# Patient Record
Sex: MALE | Race: WHITE
[De-identification: names, ages, dates, MRNs, and addresses within clinical notes are randomized per-mention and may not be internally consistent; named-entity substitution may affect disease eponyms.]

---

## 2019-09-04 ENCOUNTER — HOSPITAL ENCOUNTER (OUTPATIENT)
Dept: HOSPITAL 75 - RAD | Age: 74
End: 2019-09-04
Attending: ORTHOPAEDIC SURGERY
Payer: MEDICARE

## 2019-09-04 DIAGNOSIS — M48.061: Primary | ICD-10-CM

## 2019-09-04 DIAGNOSIS — Z98.1: ICD-10-CM

## 2019-09-04 PROCEDURE — 72148 MRI LUMBAR SPINE W/O DYE: CPT

## 2019-09-04 NOTE — DIAGNOSTIC IMAGING REPORT
PROCEDURE: MRI lumbar spine.



TECHNIQUE: Multiplanar, multisequence MRI of the lumbar spine was

performed without contrast.



INDICATION: Chronic low back pain with recent increase in

severity.



COMPARISON: There is no previous study available at this time for

comparison.



FINDINGS: Lumbar spinal curvature and alignment are unremarkable.

Vertebral body heights and disc spaces are maintained, although

there has been an L4-L5 discectomy with posterior fusion at L4-L5

on the left. Conus medullaris is unremarkable at the L1 level.

There is mild bulging of the L1-L2 disc. At L2-L3, there is

diffuse disc bulging which is eccentric toward the right. This

results in mild neuroforaminal stenosis, greater on the right.



At L3-L4, there is also disc bulging which is eccentric toward

the right resulting in mild left and moderate right

neuroforaminal stenosis.



At the L4-L5 level, disc bulging and endplate spurring result in

moderate bilateral neuroforaminal stenosis. There is mild disc

bulging at L5-S1 without significant stenosis. Note is made of

dominant cystic structure which is partially visualized in the

left retroperitoneum. This may represent dominant renal cyst.



IMPRESSION:

1. 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

without significant spinal stenosis identified. L4-L5 discectomy

and posterior fusion are noted.

2. There is a dominant cystic structure in the left

retroperitoneum which may be renal in nature. Clinical

correlation would be of use. Consideration could be given to

ultrasonography for confirmation.



Dictated by: 



  Dictated on workstation # [id number]

## 2020-05-29 ENCOUNTER — HOSPITAL ENCOUNTER (INPATIENT)
Dept: HOSPITAL 75 - ER | Age: 75
LOS: 1 days | Discharge: HOME HEALTH | DRG: 392 | End: 2020-05-30
Attending: INTERNAL MEDICINE | Admitting: INTERNAL MEDICINE
Payer: MEDICARE

## 2020-05-29 VITALS — HEIGHT: 70 IN | WEIGHT: 199.96 LBS | BODY MASS INDEX: 28.63 KG/M2

## 2020-05-29 VITALS — DIASTOLIC BLOOD PRESSURE: 89 MMHG | SYSTOLIC BLOOD PRESSURE: 139 MMHG

## 2020-05-29 VITALS — DIASTOLIC BLOOD PRESSURE: 85 MMHG | SYSTOLIC BLOOD PRESSURE: 135 MMHG

## 2020-05-29 VITALS — DIASTOLIC BLOOD PRESSURE: 84 MMHG | SYSTOLIC BLOOD PRESSURE: 126 MMHG

## 2020-05-29 VITALS — SYSTOLIC BLOOD PRESSURE: 124 MMHG | DIASTOLIC BLOOD PRESSURE: 77 MMHG

## 2020-05-29 VITALS — SYSTOLIC BLOOD PRESSURE: 145 MMHG | DIASTOLIC BLOOD PRESSURE: 93 MMHG

## 2020-05-29 DIAGNOSIS — N40.0: ICD-10-CM

## 2020-05-29 DIAGNOSIS — R53.81: ICD-10-CM

## 2020-05-29 DIAGNOSIS — I48.0: ICD-10-CM

## 2020-05-29 DIAGNOSIS — Z79.01: ICD-10-CM

## 2020-05-29 DIAGNOSIS — M54.9: ICD-10-CM

## 2020-05-29 DIAGNOSIS — R11.2: Primary | ICD-10-CM

## 2020-05-29 DIAGNOSIS — Z20.828: ICD-10-CM

## 2020-05-29 DIAGNOSIS — R29.6: ICD-10-CM

## 2020-05-29 DIAGNOSIS — I10: ICD-10-CM

## 2020-05-29 DIAGNOSIS — Z87.891: ICD-10-CM

## 2020-05-29 DIAGNOSIS — R05: ICD-10-CM

## 2020-05-29 DIAGNOSIS — I08.2: ICD-10-CM

## 2020-05-29 DIAGNOSIS — R53.83: ICD-10-CM

## 2020-05-29 DIAGNOSIS — R63.4: ICD-10-CM

## 2020-05-29 DIAGNOSIS — G89.29: ICD-10-CM

## 2020-05-29 LAB
ALBUMIN SERPL-MCNC: 3.7 GM/DL (ref 3.2–4.5)
ALP SERPL-CCNC: 76 U/L (ref 40–136)
ALT SERPL-CCNC: 10 U/L (ref 0–55)
AMYLASE SERPL-CCNC: 35 U/L (ref 25–125)
APTT BLD: 39 SEC (ref 24–35)
APTT PPP: YELLOW S
BACTERIA #/AREA URNS HPF: NEGATIVE /HPF
BASOPHILS # BLD AUTO: 0 10^3/UL (ref 0–0.1)
BASOPHILS NFR BLD AUTO: 0 % (ref 0–10)
BILIRUB SERPL-MCNC: 0.8 MG/DL (ref 0.1–1)
BILIRUB UR QL STRIP: NEGATIVE
BUN/CREAT SERPL: 8
CALCIUM SERPL-MCNC: 9.3 MG/DL (ref 8.5–10.1)
CHLORIDE SERPL-SCNC: 100 MMOL/L (ref 98–107)
CO2 SERPL-SCNC: 23 MMOL/L (ref 21–32)
CREAT SERPL-MCNC: 0.84 MG/DL (ref 0.6–1.3)
D DIMER PPP FEU-MCNC: 0.82 UG/ML (ref 0–0.49)
EOSINOPHIL # BLD AUTO: 0 10^3/UL (ref 0–0.3)
EOSINOPHIL NFR BLD AUTO: 0 % (ref 0–10)
ERYTHROCYTE [DISTWIDTH] IN BLOOD BY AUTOMATED COUNT: 14.1 % (ref 10–14.5)
FIBRINOGEN PPP-MCNC: CLEAR MG/DL
GFR SERPLBLD BASED ON 1.73 SQ M-ARVRAT: > 60 ML/MIN
GLUCOSE SERPL-MCNC: 165 MG/DL (ref 70–105)
GLUCOSE UR STRIP-MCNC: NEGATIVE MG/DL
HCT VFR BLD CALC: 39 % (ref 40–54)
HGB BLD-MCNC: 13.3 G/DL (ref 13.3–17.7)
INR PPP: 1.3 (ref 0.8–1.4)
KETONES UR QL STRIP: (no result)
LEUKOCYTE ESTERASE UR QL STRIP: NEGATIVE
LIPASE SERPL-CCNC: 22 U/L (ref 8–78)
LYMPHOCYTES # BLD AUTO: 0.9 X 10^3 (ref 1–4)
LYMPHOCYTES NFR BLD AUTO: 13 % (ref 12–44)
MAGNESIUM SERPL-MCNC: 1.6 MG/DL (ref 1.6–2.4)
MANUAL DIFFERENTIAL PERFORMED BLD QL: NO
MCH RBC QN AUTO: 30 PG (ref 25–34)
MCHC RBC AUTO-ENTMCNC: 34 G/DL (ref 32–36)
MCV RBC AUTO: 86 FL (ref 80–99)
MONOCYTES # BLD AUTO: 0.7 X 10^3 (ref 0–1)
MONOCYTES NFR BLD AUTO: 10 % (ref 0–12)
NEUTROPHILS # BLD AUTO: 5.3 X 10^3 (ref 1.8–7.8)
NEUTROPHILS NFR BLD AUTO: 77 % (ref 42–75)
NITRITE UR QL STRIP: NEGATIVE
PH UR STRIP: 7.5 [PH] (ref 5–9)
PLATELET # BLD: 264 10^3/UL (ref 130–400)
PMV BLD AUTO: 8.8 FL (ref 7.4–10.4)
POTASSIUM SERPL-SCNC: 3.9 MMOL/L (ref 3.6–5)
PROT SERPL-MCNC: 7.4 GM/DL (ref 6.4–8.2)
PROT UR QL STRIP: NEGATIVE
PROTHROMBIN TIME: 16.7 SEC (ref 12.2–14.7)
RBC #/AREA URNS HPF: (no result) /HPF
SODIUM SERPL-SCNC: 138 MMOL/L (ref 135–145)
SP GR UR STRIP: 1.01 (ref 1.02–1.02)
SQUAMOUS #/AREA URNS HPF: (no result) /HPF
WBC # BLD AUTO: 6.9 10^3/UL (ref 4.3–11)
WBC #/AREA URNS HPF: (no result) /HPF

## 2020-05-29 PROCEDURE — 74177 CT ABD & PELVIS W/CONTRAST: CPT

## 2020-05-29 PROCEDURE — 85379 FIBRIN DEGRADATION QUANT: CPT

## 2020-05-29 PROCEDURE — 93306 TTE W/DOPPLER COMPLETE: CPT

## 2020-05-29 PROCEDURE — 80053 COMPREHEN METABOLIC PANEL: CPT

## 2020-05-29 PROCEDURE — 83615 LACTATE (LD) (LDH) ENZYME: CPT

## 2020-05-29 PROCEDURE — 85652 RBC SED RATE AUTOMATED: CPT

## 2020-05-29 PROCEDURE — 71045 X-RAY EXAM CHEST 1 VIEW: CPT

## 2020-05-29 PROCEDURE — 87040 BLOOD CULTURE FOR BACTERIA: CPT

## 2020-05-29 PROCEDURE — 84145 PROCALCITONIN (PCT): CPT

## 2020-05-29 PROCEDURE — 87635 SARS-COV-2 COVID-19 AMP PRB: CPT

## 2020-05-29 PROCEDURE — 85025 COMPLETE CBC W/AUTO DIFF WBC: CPT

## 2020-05-29 PROCEDURE — 36415 COLL VENOUS BLD VENIPUNCTURE: CPT

## 2020-05-29 PROCEDURE — 80162 ASSAY OF DIGOXIN TOTAL: CPT

## 2020-05-29 PROCEDURE — 96375 TX/PRO/DX INJ NEW DRUG ADDON: CPT

## 2020-05-29 PROCEDURE — 86141 C-REACTIVE PROTEIN HS: CPT

## 2020-05-29 PROCEDURE — 81000 URINALYSIS NONAUTO W/SCOPE: CPT

## 2020-05-29 PROCEDURE — 93041 RHYTHM ECG TRACING: CPT

## 2020-05-29 PROCEDURE — 87631 RESP VIRUS 3-5 TARGETS: CPT

## 2020-05-29 PROCEDURE — 85730 THROMBOPLASTIN TIME PARTIAL: CPT

## 2020-05-29 PROCEDURE — 85610 PROTHROMBIN TIME: CPT

## 2020-05-29 PROCEDURE — 93005 ELECTROCARDIOGRAM TRACING: CPT

## 2020-05-29 PROCEDURE — 84484 ASSAY OF TROPONIN QUANT: CPT

## 2020-05-29 PROCEDURE — 83735 ASSAY OF MAGNESIUM: CPT

## 2020-05-29 PROCEDURE — 96374 THER/PROPH/DIAG INJ IV PUSH: CPT

## 2020-05-29 PROCEDURE — 94640 AIRWAY INHALATION TREATMENT: CPT

## 2020-05-29 PROCEDURE — 83605 ASSAY OF LACTIC ACID: CPT

## 2020-05-29 PROCEDURE — 82962 GLUCOSE BLOOD TEST: CPT

## 2020-05-29 PROCEDURE — 96372 THER/PROPH/DIAG INJ SC/IM: CPT

## 2020-05-29 PROCEDURE — 87798 DETECT AGENT NOS DNA AMP: CPT

## 2020-05-29 PROCEDURE — 83690 ASSAY OF LIPASE: CPT

## 2020-05-29 PROCEDURE — 96361 HYDRATE IV INFUSION ADD-ON: CPT

## 2020-05-29 PROCEDURE — 82150 ASSAY OF AMYLASE: CPT

## 2020-05-29 RX ADMIN — DEXTROSE MONOHYDRATE, SODIUM CHLORIDE, AND POTASSIUM CHLORIDE SCH MLS/HR: 50; 4.5; 1.49 INJECTION, SOLUTION INTRAVENOUS at 08:44

## 2020-05-29 RX ADMIN — ONDANSETRON PRN MG: 2 INJECTION, SOLUTION INTRAMUSCULAR; INTRAVENOUS at 19:29

## 2020-05-29 RX ADMIN — APIXABAN SCH MG: 5 TABLET, FILM COATED ORAL at 21:38

## 2020-05-29 RX ADMIN — INSULIN ASPART SCH UNIT: 100 INJECTION, SOLUTION INTRAVENOUS; SUBCUTANEOUS at 11:15

## 2020-05-29 RX ADMIN — DEXTROSE MONOHYDRATE, SODIUM CHLORIDE, AND POTASSIUM CHLORIDE SCH MLS/HR: 50; 4.5; 1.49 INJECTION, SOLUTION INTRAVENOUS at 15:50

## 2020-05-29 RX ADMIN — INSULIN ASPART SCH UNIT: 100 INJECTION, SOLUTION INTRAVENOUS; SUBCUTANEOUS at 23:04

## 2020-05-29 RX ADMIN — ONDANSETRON PRN MG: 2 INJECTION, SOLUTION INTRAMUSCULAR; INTRAVENOUS at 11:10

## 2020-05-29 RX ADMIN — ALBUTEROL SULFATE SCH PUFF: 90 AEROSOL, METERED RESPIRATORY (INHALATION) at 22:45

## 2020-05-29 RX ADMIN — INSULIN ASPART SCH UNIT: 100 INJECTION, SOLUTION INTRAVENOUS; SUBCUTANEOUS at 15:36

## 2020-05-29 RX ADMIN — SENNOSIDES SCH MG: 8.6 TABLET, FILM COATED ORAL at 21:38

## 2020-05-29 RX ADMIN — GABAPENTIN SCH MG: 300 CAPSULE ORAL at 21:38

## 2020-05-29 RX ADMIN — DOCUSATE SODIUM SCH MG: 100 CAPSULE ORAL at 21:38

## 2020-05-29 RX ADMIN — METOPROLOL TARTRATE SCH MG: 50 TABLET, FILM COATED ORAL at 21:39

## 2020-05-29 RX ADMIN — ALBUTEROL SULFATE SCH GM: 90 AEROSOL, METERED RESPIRATORY (INHALATION) at 14:13

## 2020-05-29 NOTE — XMS REPORT
Continuity of Care Document

                             Created on: 2020



DUKE MONSALVE

External Reference #: [id number]

: 1945

Sex: Male



Demographics





                          Address                   [address]  [address]

 

                          Home Phone                [phone] x

 

                          Preferred Language        Unknown

 

                          Marital Status            Unknown

 

                          Mu-ism Affiliation     Unknown

 

                          Race                      Unknown

 

                          Ethnic Group              Unknown





Author





                          Organization              Unknown

 

                          Address                   Unknown

 

                          Phone                     Unavailable



              



Allergies

      



             Active           Description           Code           Type         

  Severity   

                Reaction           Onset           Reported/Identified          

 

Relationship to Patient                 Clinical Status        

 

           Yes           AMOXICILLIN                                   MODERATE 

          

DERMATOLOGICAL - WILL[facility]                                                     

        

 

 

           Yes           AMOXICILLIN                                   MODERATE 

          

MODERATE                                                               



                    



Medications

      



                Medication           Packaging           Start Date           St

op Date         

                    Route               Dosage              Sig        

 

                                      OXYCODONE 5MG/APAP 325MG TAB(PERCOCET-5)  

                   TAB    

             2018                                    

         

   PRN Q6H                  

 

                                HYDRALAZINE TAB 25 MG (APRESOLINE)              

       MG                  

2018                                                 

      

PRN Q4H                  

 

                                      ALBUTEROL INHALER MDI 8 GM (VENTOLIN HFA) 

                    

PUFF(S)           2018                               

    

                                                    PRN QID                  

 

                                      Docusate sodium 100mg oral capsule (COLACE

)                     MG  

             2018                                    

       

     BID&0800,2000                  

 

                                METOPROLOL TAB 50 MG (LOPRESSOR)                

     MG                  

2018                                                 

      

BID&0800,2000                  

 

                                VERAPAMIL TAB 80 MG (CALAN)                     

MG                  

2018                                                 

      

Q8H&0600,1400,2200                  

 

                                DOXAZOSIN TAB 2 MG (CARDURA)                    

 MG                  

2018                                                 

      

Daily&0900                  

 

                                VERAPAMIL TAB 80 MG (CALAN)                     

MG                  

2018                                                 

      

Q8H&0600,1400,2200                  

 

                                      Lidocaine adhesive patch 5% (Lidoderm)    

                 PATCH    

             2018           08/10/2018                                    

         

   Daily&0900                  

 

                                CLOPIDOGREL TAB 75 MG (PLAVIX)                  

   MG                  

2018                                                 

      

Daily&0900                  

 

                                DOXAZOSIN TAB 2 MG (CARDURA)                    

 MG                  

2018                                                 

      

Daily&0900                  

 

                                                    ASPIRIN ENTERIC COATED TAB 8

1 MG (BABY ASPIRIN EC)                  

             MG           2018                       

            

                                                    Daily&0900                  

 

                                DIGOXIN TAB 0.125 MG (LANOXIN)                  

   MG                  

2018                                                 

      

Daily&0900                  

 

                                                    LACTULOSE SYRUP LIQ 20 GM/30

CC (CHRONULAC SYRUP)                    

           GM           2018                         

           

          Daily&0900                  

 

                                VERAPAMIL TAB 80 MG (CALAN)                     

MG                  

2018                                                 

      

Q8H&0600,1400,2200                  

 

                                      OXYCODONE 5MG/APAP 325MG TAB(PERCOCET-5)  

                   TAB    

             2018                                    

         

   PRN Q6H                  

 

                                      ALBUTEROL INHALER MDI 8 GM (VENTOLIN HFA) 

                    

PUFF(S)           2018                               

    

                                                    PRN QID                  

 

                                      Docusate sodium 100mg oral capsule (COLACE

)                     MG  

             2018                                    

       

     BID&08,                  

 

                                METOPROLOL TAB 50 MG (LOPRESSOR)                

     MG                  

2018                                                 

      

BID&08,                  

 

                                CLOPIDOGREL TAB 75 MG (PLAVIX)                  

   MG                  

2018                                                 

      

Daily&0900                  

 

                                DOXAZOSIN TAB 2 MG (CARDURA)                    

 MG                  

2018                                                 

      

Daily&0900                  

 

                                                    ASPIRIN ENTERIC COATED TAB 8

1 MG (BABY ASPIRIN EC)                  

             MG           2018                       

            

                                                    Daily&0900                  

 

                                DIGOXIN TAB 0.125 MG (LANOXIN)                  

   MG                  

2018           08/10/2018                                                 

      

Daily&0900                  

 

                                      ACETAMINOPHEN ORAL TABLET 325mg(Tylenol)  

                   MG     

             2018                                    

          

  PRN Q6H                  

 

                                LACTATED RINGERS 500CC IV BAG INJ               

      ml                  

2018                                                 

      

CONTINUOUSEVERY 0 Hour                  

 

                                      OXYCODONE 5MG/APAP 325MG TAB(PERCOCET-5)  

                   TAB    

             2018                                    

         

   PRN Q6H                  

 

                                      ALBUTEROL INHALER MDI 8 GM (VENTOLIN HFA) 

                    

PUFF(S)           2018                               

    

                                                    PRN QID                  

 

                                      Normal SALINE 0.9 % (NS 100cc) (plain bag)

                     ml   

             2018                                    

        

    ONCE&1841                  

 

                                                    IPRATROPIUM/ALBUTEROL INH SO

LN (DUO-NEB INH SOLN)                   

             MLS           2018                      

             

                                                    ONCE&1855                  

 

                                      DIGOXIN  inj 250mcg/cc 2cc amp (Lanoxin)  

                   MCG    

             2018                                    

         

   ONCE&1857                  

 

                                                    NORMAL SALINE 1000CC IV BAG 

INJ 0.9 % (NS 1000CC IV BAG)            

             ml           2018                       

      

                                                    CONTINUOUSEVERY 0 Hour      

            

 

                                CLOPIDOGREL TAB 75 MG (PLAVIX)                  

   MG                  

2018                                                 

      

Daily&                  

 

                                      Docusate sodium 100mg oral capsule (COLACE

)                     MG  

             2018                                    

       

     BID&0800,                  

 

                                                    NORMAL SALINE 250CC IV BAG I

NJ 0.9 % (NS 250CC IV BAG)              

             ml           2018                       

        

                                                    ONCE&                  

 

                                METOPROLOL TAB 50 MG (LOPRESSOR)                

     MG                  

2018                                                 

      

BID&0800,                  

 

                                METOPROLOL TAB 50 MG (LOPRESSOR)                

     MG                  

2018                                                 

      

ONCE&2020                  

 

                                      LACTOBACILLUS BULGARIS TAB (LACTINEX BULGA

RIS)                     

tab           2018                                   

  

           QID&0800,1200,1700,2200                  

 

                                      ACETAMINOPHEN ORAL TABLET 325mg(Tylenol)  

                   MG     

             2018                                    

          

  PRN EVERY 4 Hour                  

 

                                      ACETAMINOPHEN SUPPOS  MG (TYLENOL) 

                    MG    

             2018                                    

         

   PRN Q6H                  

 

                                VERAPAMIL TAB 80 MG (CALAN)                     

MG                  

2018                                                 

      

Q8H&0600,1400,2200                  

 

                                METOPROLOL TAB 50 MG (LOPRESSOR)                

     MG                  

2018                                                 

      

BID&0800,                  

 

                                      Normal SALINE 0.9 % (NS 100cc) (plain bag)

                     ml   

             2018                                    

        

    BID&0800,                  

 

                                METOPROLOL TAB 50 MG (LOPRESSOR)                

     MG                  

2018                                                 

      

ONCE&0800,                  

 

                                DOXAZOSIN TAB 2 MG (CARDURA)                    

 MG                  

2018                                                 

      

Daily&0900                  

 

                                                    ASPIRIN ENTERIC COATED TAB 8

1 MG (BABY ASPIRIN EC)                  

             MG           2018                       

            

                                                    Daily&0900                  

 

                                DIGOXIN TAB 0.25 MG (LANOXIN)                   

  MG                  

2018                                                 

      

Daily&0900                  

 

                                DIGOXIN TAB 0.125 MG (LANOXIN)                  

   MG                  

2018                                                 

      

Daily&0900                  

 

                                      PANTOPRAZOLE VIAL INJ 40 MG (PROTONIX IV) 

                    MG    

             2018                                    

         

   Daily&0900                  

 

                                IBUPROFEN  MG (MOTRIN)                   

  MG                  

2018           08/15/2018                                                 

      

PRN Q6H                  

 

                                                    DILTIAZEM BOLUS VIAL INJ 25 

MG/5CC (CARDIZEM BOLUS VIAL)            

             MG           2018                       

      

                                                    ONCE&1515                  

 

                                      Normal SALINE 0.9 % (NS 100cc) (plain bag)

                     ml   

             2018           08/15/2018                                    

        

    CONTINUOUSEVERY 0 Hour                  

 

                                                    NORMAL SALINE 250CC IV BAG I

NJ 0.9 % (NS 250CC IV BAG)              

             ml           08/10/2018           2018                       

        

                                                    Q12H&1000,2200              

    

 

                                      OXYCODONE 5MG/APAP 325MG TAB(PERCOCET-5)  

                   TAB    

             08/10/2018           2018                                    

         

   PRN Q6H                  

 

                                      ACETAMINOPHEN ORAL TABLET 325mg(Tylenol)  

                   MG     

             08/10/2018           2018                                    

          

  PRN EVERY 4 Hour                  

 

                                IBUPROFEN  MG (MOTRIN)                   

  MG                  

08/10/2018           2018                                                 

      

PRN Q4H                  

 

                                VERAPAMIL TAB 80 MG (CALAN)                     

MG                  

08/10/2018           2018                                                 

      

Q8H&0600,1400,2200                  

 

                                      LACTOBACILLUS BULGARIS TAB (LACTINEX BULGA

RIS)                     

tab           08/10/2018           2018                                   

  

           QID&0800,1200,1700,2200                  

 

                                      ALBUTEROL INHALER MDI 8 GM (VENTOLIN HFA) 

                    

PUFF(S)           08/10/2018           2018                               

    

                                                    PRN QID                  

 

                                CLOPIDOGREL TAB 75 MG (PLAVIX)                  

   MG                  

08/10/2018           2018                                                 

      

Daily&                  

 

                                      Docusate sodium 100mg oral capsule (COLACE

)                     MG  

             08/10/2018           2018                                    

       

     BID&0800,2000                  

 

                                METOPROLOL TAB 50 MG (LOPRESSOR)                

     MG                  

08/10/2018           2018                                                 

      

BID&0800,2000                  

 

                                      VANCOMYCIN VIAL  MG (VANCOCIN VIAL)

                     MG   

             08/10/2018           2018                                    

        

    Q12H&1000,2200                  

 

                                DOXAZOSIN TAB 2 MG (CARDURA)                    

 MG                  

2018                                                 

      

Daily&0900                  

 

                                                    ASPIRIN ENTERIC COATED TAB 8

1 MG (BABY ASPIRIN EC)                  

             MG           2018                       

            

                                                    Daily&0900                  

 

                                PANTOPRAZOLE TAB 40 MG (PROTONIX)               

      MG                  

2018                                                 

      

Daily&0900                  

 

                                DIGOXIN TAB 0.25 MG (LANOXIN)                   

  MG                  

2018                                                 

      

Daily&0900                  

 

                                                    NORMAL SALINE 250CC IV BAG I

NJ 0.9 % (NS 250CC IV BAG)              

             ml           2018                       

        

                                                    BID&1030,2230               

   

 

                                METOPROLOL TAB 50 MG (LOPRESSOR)                

     MG                  

08/15/2018           2018                                                 

      

BID&0800,2000                  

 

                                CLOPIDOGREL TAB 75 MG (PLAVIX)                  

   MG                  

2018                                                 

      

Daily&0900                  

 

                                DIGOXIN TAB 0.125 MG (LANOXIN)                  

   MG                  

2018                                                 

      

Daily&0900                  

 

                                VERAPAMIL TAB 80 MG (CALAN)                     

Dose(s)             

2018                                                 

      

Q8H&0600,1400,2200                  

 

                                                    NORMAL SALINE 1000CC IV BAG 

INJ 0.9 % (NS 1000CC IV BAG)            

             ml           2019                       

      

                                                    ONCE&1445                  

 

                                      ONDANSETRON VIAL INJ 4 MG/2CC (ZOFRAN 2CC 

VIAL)                     

MG           2019                                    

  

          PRN ONCE                  

 

                                                    THIAMINE VIAL 2CC  MG

/CC (VIT B1 2CC VIAL)                   

           MG           2019                         

          

           ONCE&1730                  

 

                                                    CEFTRIAXONE PREMIX IV BAG IV

 1 GM/50CC (ROCEPHIN PREMIX IV BAG)     

                GM              2019                 

    

                                                                ONCE&1730       

           

 

                                      ACETAMINOPHEN ORAL TABLET 325mg(Tylenol)  

                   MG     

             2019                                    

          

  PRN EVERY 6 Hour                  

 

                                                    MULTIPLE VITAMIN INFUSION IN

J (MVI ADULT 2 VIAL KIT)                

             VIALS           2019                    

          

                                                    ONCE&1738                  

 

                                ALPRAZOLAM TAB 0.25 MG (XANAX)                  

   MG                  

2019                                                 

      

PRN Q6H                  

 

                                      HALOPERIDOL VIAL INJ 5 MG/CC (HALDOL 1CC V

IAL)                     

MG           2019                                    

  

          PRN Q4H                  

 

                                CLONIDINE TAB 0.1 MG (CATAPRES)                 

    MG                  

2019                                                 

      

PRN Q6H                  

 

                                      ACETAMINOPHEN SUPPOS  MG (TYLENOL) 

                    MG    

             2019                                    

         

   PRN Q4H                  

 

                                      ONDANSETRON VIAL INJ 4 MG/2CC (ZOFRAN 2CC 

VIAL)                     

MG           2019                                    

  

          PRN Q4H                  

 

                                      CALCIUM CARBONATE  MG (TUMS)       

              MG          

             2019                                    

             

PRN Q6H                  

 

                                      DIPHENHYDRAMINE CAP 25 MG (BENADRYL)      

               MG         

             2019                                    

             

PRN Q6H                  

 

                                      PROMETHAZINE VIAL INJ 25 MG/CC (PHENERGAN 

VIAL)                     

MG           2019                                    

  

          PRN Q6H                  

 

                                                    HYDROCODONE/APAP 5MG/325MG T

AB 5 MG/325MG (LANDON-TAB 5/325)           

             TAB           2019                      

     

                                                    PRN Q6H                  

 

                                      ALUM/MAG/SIMETH 30CC LIQ (MYLANTA PLUS)   

                  cc      

             2019                                    

           

 PRN Q4H                  

 

                                LORAZEPAM TAB 1 MG (ATIVAN)                     

MG                  

2019                                                 

      

PRN ONCE                  

 

                                      Metoprolol IV soln 5mg/5cc vial (Lopressor

)                     MG  

             2019                                    

       

     ONCE&1923                  

 

                                      Docusate sodium 100mg oral capsule (COLACE

)                         

             2019                                    

        

    PRN BID                  

 

                                METOPROLOL TAB 50 MG (LOPRESSOR)                

     MG                  

2019                                                 

      

BID&0800,2000                  

 

                                VERAPAMIL TAB 80 MG (CALAN)                     

MG                  

2019                                                 

      

BID&0800,2000                  

 

                                                    LACTULOSE SYRUP LIQ 20 GM/30

CC (CHRONULAC SYRUP)                    

           GM           2019                         

           

          BID&0800,2000                  

 

                                MELATONIN TAB 3 MG (MELATONIN)                  

   MG                  

2019                                                 

      

QHS&2100                  

 

                                DOXAZOSIN TAB 8 MG (CARDURA)                    

 MG                  

2019                                                 

      

Daily&0900                  

 

                                BISACODYL TAB 5 MG (DULCOLAX)                   

  MG                  

2019                                                 

      

PRN Daily                  

 

                                DULOXETINE CAP 30 MG (CYMBALTA)                 

    MG                  

2019                                                 

      

Daily&0900                  

 

                                PANTOPRAZOLE TAB 40 MG (PROTONIX)               

      MG                  

2019                                                 

      

Daily&0900                  

 

                                                    POLYETHYLENE GLYCOL POWDER U

D PWD (MIRALAX 17GM UNIT DOSE PAKS)     

                gm              2019                 

    

                                                                Daily&0900      

            

 

                                                    MultiVits (Thera M Plus) (mu

ltivit-iron-calcium) oral tablet        

             TAB           2019                      

  

                                                    Daily&0900                  

 

                                THIAMINE  MG (VITAMIN B1)                

     MG                  

2019                                                 

      

Daily&0900                  

 

                                      BISACODYL SUPPOS 10 MG (DULCOLAX SUPPOS)  

                   MG     

             2019                                    

          

  PRN Daily                  

 

                                                    CEFTRIAXONE PREMIX IV BAG IV

 1 GM/50CC (ROCEPHIN PREMIX IV BAG)     

                GM              2019                 

    

                                                                Daily&0900      

            

 

                                MILK OF MAGNESIA LIQ                     ml     

             2019                                                      

 PRN Daily        

         

 

                                DIGOXIN TAB 0.125 MG (LANOXIN)                  

   MG                  

2019                                                 

      

Daily&0900                  

 

                                                    NORMAL SALINE 250CC IV BAG I

[place] 0.9 % (NS 250CC IV BAG)              

             ml           2019                       

        

                                                    Q12H&1100,2300              

    

 

                                DOXAZOSIN TAB 2 MG (CARDURA)                    

 MG                  

2019                                                 

      

Daily&2100                  

 

                                      SMZ/TMP DS TAB  (SEPTRA DS) (Bactrim DS)  

                   TAB    

             2019                                    

         

   BID&0800,2000                  

 

                                                    MultiVits (Thera M Plus) (mu

ltivit-iron-calcium) oral tablet        

             TAB           2019                      

  

                                                    Daily&0900                  

 

                                      OXYCODONE 5MG/APAP 325MG TAB(PERCOCET-5)  

                   Dose(s)

             2019                                    

     

       PRN Q6H                  

 

                                      Docusate sodium 100mg oral capsule (COLACE

)                         

             2019                                    

        

    BID&0800,2000                  

 

                                ACETAMINOPHEN  MG (TYLENOL)              

       MG                  

2019                                                 

      

PRN BID                  

 

                                CLOPIDOGREL TAB 75 MG (PLAVIX)                  

   MG                  

2019                                                 

      

Daily&0900                  

 

                                                    ASPIRIN ENTERIC COATED TAB 8

1 MG (BABY ASPIRIN EC)                  

             MG           2019                       

            

                                                    Daily&0900                  

 

                                CEPHALEXIN  MG (KEFLEX)                  

   MG                  

2019                                                 

      

ONCE&0150                  

 

                                CITRATE OF MAGNESIA LIQ                     ml  

                2019                                                      

 ONCE&1751     

            

 

                                      ENEMA (FLEET'S) PhosPho ADULT             

        APPLICATION       

             2019                                    

            

ONCE&1751                  

 

                                                    NORMAL SALINE 1000CC IV BAG 

INJ 0.9 % (NS 1000CC IV BAG)            

             ml           2019                       

      

                                                    ONCE&1516                  

 

                                                    THIAMINE VIAL 2CC  MG

/CC (VIT B1 2CC VIAL)                   

           MG           2019                         

          

           ONCE&1644                  

 

                                LORAZEPAM TAB 1 MG (ATIVAN)                     

MG                  

2019                                                 

      

ONCE&1652                  

 

                                      ACETAMINOPHEN ORAL TABLET 325mg(Tylenol)  

                   MG     

             2019                                    

          

  PRN EVERY 6 Hour                  

 

                                LORAZEPAM per Detox Protocol B                  

   MG                  

2019                                                 

      

PRN ONCE                  

 

                                                    MULTIPLE VITAMIN INFUSION IN

J (MVI ADULT 2 VIAL KIT)                

             VIALS           2019                    

          

                                                    ONCE&1915                  

 

                                      Docusate sodium 100mg oral capsule (COLACE

)                     MG  

             2019                                    

       

     PRN BID                  

 

                                PANTOPRAZOLE TAB 40 MG (PROTONIX)               

      MG                  

2019                                                 

      

BID&0800,2000                  

 

                                MELATONIN TAB 3 MG (MELATONIN)                  

   MG                  

2019                                                 

      

PRN QHS                  

 

                                      HALOPERIDOL VIAL INJ 5 MG/CC (HALDOL 1CC V

IAL)                     

MG           2019                                    

  

          PRN Q4H                  

 

                                      ALUM/MAG/SIMETH 30CC LIQ (MYLANTA PLUS)   

                  cc      

             2019                                    

           

 PRN Q4H                  

 

                                CLONIDINE TAB 0.1 MG (CATAPRES)                 

    MG                  

2019                                                 

      

PRN Q6H                  

 

                                      ONDANSETRON VIAL INJ 4 MG/2CC (ZOFRAN 2CC 

VIAL)                     

MG           2019                                    

  

          PRN Q6H                  

 

                                      CALCIUM CARBONATE  MG (TUMS)       

              MG          

             2019                                    

             

PRN Q6H                  

 

                                      DIPHENHYDRAMINE CAP 25 MG (BENADRYL)      

               MG         

             2019                                    

             

PRN Q6H                  

 

                                      PROMETHAZINE VIAL INJ 25 MG/CC (PHENERGAN 

VIAL)                     

MG           2019                                    

  

          PRN Q6H                  

 

                                LORAZEPAM per Detox Protocol B                  

   MG                  

2019                                                 

      

PRN EVERY 1 Hour                  

 

                                METOPROLOL TAB 50 MG (LOPRESSOR)                

     MG                  

2019                                                 

      

BID&0800,2000                  

 

                                PANTOPRAZOLE TAB 40 MG (PROTONIX)               

      MG                  

2019                                                 

      

BID&0800,2000                  

 

                                                    POLYETHYLENE GLYCOL POWDER U

D PWD (MIRALAX 17GM UNIT DOSE PAKS)     

                gm              2019                 

    

                                                                Daily&0900      

            

 

                                VERAPAMIL TAB 80 MG (CALAN)                     

MG                  

2019                                                 

      

Daily&0900                  

 

                                THIAMINE  MG (VITAMIN B1)                

     MG                  

2019                                                 

      

Daily&0900                  

 

                                      BISACODYL SUPPOS 10 MG (DULCOLAX SUPPOS)  

                   MG     

             2019                                    

          

  PRN Daily                  

 

                                      Normal SALINE 0.9 % (NS 100cc) (plain bag)

                     ml   

             2019                                    

        

    CONTINUOUSEVERY 0 Hour                  

 

                                      GABAPENTIN  MG (NEURONTIN)         

            Dose(s)       

             2019                                    

            

ONCE&1128                  

 

                                                    NORMAL SALINE 1000CC IV BAG 

INJ 0.9 % (NS 1000CC IV BAG)            

             ml           2019                       

      

                                                    CONTINUOUSEVERY 0 Hour      

            

 

                                LORAZEPAM per Detox Protocol B                  

   MG                  

2019                                                 

      

Q6H&0600,1200,1800,2359                  

 

                                                    MULTIPLE VITAMIN INFUSION IN

J (MVI ADULT 2 VIAL KIT)                

             VIALS           2019                    

          

                                                    QPM&2000                  

 

                                      ENOXAPARIN SYRINGE INJ 40 MG (LOVENOX SYRI

NGE)                     

MG           2019                                    

  

          QHS&2100                  

 

                                CLOPIDOGREL TAB 75 MG (PLAVIX)                  

   MG                  

2019                                                 

      

Daily&0900                  

 

                                      LACTOBACILLUS BULGARIS TAB (LACTINEX BULGA

RIS)                      

             2019                                    

     

       QID&0800,1200,1700,2200                  

 

                                APIXABAN TAB 5 MG (ELIQUIS)                     

MG                  

2019                                                 

      

BID&0800,2000                  

 

                                AMLODIPINE TAB 5 MG (NORVASC)                   

  MG                  

2019                                                 

      

ONCE&1006                  

 

                                      ACETAMINOPHEN ORAL TABLET 325mg(Tylenol)  

                   MG     

             2019                                    

          

  PRN EVERY 6 Hour                  

 

                                      ALUM/MAG/SIMETH 30CC LIQ (MYLANTA PLUS)   

                  cc      

             2019           01/10/2020                                    

           

 PRN Q4H                  

 

                                                    POLYETHYLENE GLYCOL POWDER U

D PWD (MIRALAX 17GM UNIT DOSE PAKS)     

                gm              2019           01/10/2020                 

    

                                                                PRN Q3H         

         

 

                                      CALMOSEPTINE OINT TUBE (RISAMINE OINT)    

                 zaira      

             2019                                    

           

 PRN QID                  

 

                                LOPERAMIDE CAP 2 MG (IMMODIUM)                  

   MG                  

2019                                                 

      

PRN QID                  

 

                                METOPROLOL TAB 50 MG (LOPRESSOR)                

     MG                  

2019                                                 

      

BID&0800,2000                  

 

                                GABAPENTIN  MG (NEURONTIN)               

      MG                  

2019                                                 

      

QPM&2000                  

 

                                                    LACTULOSE SYRUP LIQ 20 GM/30

CC (CHRONULAC SYRUP)                    

           GM           2019                         

           

          BID&0800,2000                  

 

                                APIXABAN TAB 5 MG (ELIQUIS)                     

MG                  

2019                                                 

      

BID&0800,2000                  

 

                                CLOPIDOGREL TAB 75 MG (PLAVIX)                  

   MG                  

2020                                                 

      

QAM&0800                  

 

                                VERAPAMIL TAB 80 MG (CALAN)                     

MG                  

2020                                                 

      

QAM&0800                  

 

                                DIGOXIN TAB 0.125 MG (LANOXIN)                  

   MCG                 

2020                                                 

      

QAM&0800                  

 

                                      BISACODYL SUPPOS 10 MG (DULCOLAX SUPPOS)  

                   MG     

             2020                                    

          

  PRN Daily                  

 

                                TAMSULOSIN CAP 0.4 MG (FLOMAX)                  

   MG                  

2020                                                 

      

QPM&1800                  

 

                                      VENLAFAXINE XR CAP 75 MG (EFFEXOR XR)     

                MG        

             2020                                    

             

Daily&0900                  

 

                                FOLIC ACID TAB 1 MG                     MG      

            2020                                                      

 Daily&0900        

         

 

                                                    Multivit-iron-min-folic acid

) tab,CHEWable (Centrum)                

             TAB           2020                      

          

                                                    Daily&0900                  

 

                                MIRTAZAPINE TAB 15 MG (REMERON)                 

    MG                  

2020                                                 

      

QHS&2100                  

 

                                AMLODIPINE TAB 5 MG (NORVASC)                   

  MG                  

2020                                                 

      

Daily&0900                  

 

                                      VENLAFAXINE XR CAP 75 MG (EFFEXOR XR)     

                MG        

             2020                                    

             

Daily&0900                  

 

                                FINASTERIDE TAB 5 MG (PROSCAR)                  

   MG                  

2020                                                 

      

ONCE&1021                  

 

                                                    Flu vacc [id number] 6mos up(P

F)) IM syringe QUAD (Fluarix)           

             ML           2020                       

     

                                                    ONCE&205                  

 

                                PNEUMONIA VACCINE INJ (PREVNAR-13)              

       ml                  

2020                                                 

      

ONCE&                  

 

                                MELATONIN TAB 3 MG (MELATONIN)                  

   MG                  

2020                                                 

      

QHS&2100                  

 

                                FINASTERIDE TAB 5 MG (PROSCAR)                  

   MG                  

2020                                                 

      

Daily&0900                  

 

                                APIXABAN TAB 5 MG (ELIQUIS)                     

MG                  

2020                                                 

      

BID&0800,2000                  

 

                                      Metoprolol IV soln 5mg/5cc vial (Lopressor

)                     MG  

             2020                                    

       

     ONCE&1047                  

 

                                                    NORMAL SALINE 1000CC IV BAG 

INJ 0.9 % (NS 1000CC IV BAG)            

             ml           2020                       

      

                                                    ONCE&1047                  

 

                                METOPROLOL TAB 25 MG (LOPRESSOR)                

     MG                  

2020                                                 

      

ONCE&1113                  

 

                                                    IPRATROPIUM/ALBUTEROL INH SO

LN (DUO-NEB INH SOLN)                   

             MLS           2020                      

             

                                                    ONCE&1113                  

 

                                DIGOXIN  MCG (LANOXIN)                   

  MCG                 

2020                                                 

      

ONCE&1113                  



                                                                                
                                                                                
                                                                                
                                                                                
                                                      



Problems

      



             Date Dx Coded           Attending           Type           Code    

       

Diagnosis                               Diagnosed By        

 

             2018           Alesha Paris            285.1    

       ACUTE 

POSTHEMORRHAGIC ANEMIA                           

 

             2018           Alesha Paris            338.2    

       CHRONIC

PAIN                                             

 

             2018           Alesha Paris            401.0    

              

                                                 

 

             2018           Alesha Paris            427.32   

              

                                                 

 

             2018           Alesha Paris            585.9    

       CHRONIC

KIDNEY DISEASE, UNSPECIFIED                      

 

             2018           Alesha Paris            719.45   

        PAIN 

IN JOINT INVOLVING PELVIC REGION AND THIGH                    

 

             2018           Alesha Paris           A            780.79   

              

                                                 

 

             2018           Alesha Paris            D62      

     ACUTE 

POSTHEMORRHAGIC ANEMIA                           

 

             2018           Alesha Paris            G89.29   

        OTHER 

CHRONIC PAIN                                     

 

             2018           Alesha Paris            I10      

     ESSENTIAL

(PRIMARY) HYPERTENSION                           

 

             2018           Alesha Paris            I48.2    

       CHRONIC

ATRIAL FIBRILLATION                              

 

             2018           Alesha Paris            M25.552  

         PAIN 

IN LEFT HIP                                      

 

             2018           Paris, Alesha           W            N18.9    

       CHRONIC

KIDNEY DISEASE, UNSPECIFIED                      

 

             2018           Alesha Paris           A            R53.1    

       

WEAKNESS                                         

 

             2018           Alesha Paris           W            R53.81   

        OTHER 

MALAISE                                          

 

             2018           Alesha Paris           W            V12.55   

        

PERSONAL HISTORY OF PULMONARY EMBOLISM                    

 

             2018           Alesha Paris           W            V45.4    

              

                                                 

 

             2018           Alesha Paris           W            Z86.711  

         

PERSONAL HISTORY OF PULMONARY EMBOLISM                    

 

             2018           Alesha Paris           W            Z98.1    

       

ARTHRODESIS STATUS                               

 

             2018           Alesha Paris           W            486      

     

PNEUMONIA, ORGANISM UNSPECIFIED                    

 

             2018           Alesha Paris           W            I48.9    

       

UNSPECIFIED ATRIAL FIBRILLATION AND ATRIAL FLUTTER                    

 

             2018           Alesha Paris           W            J18.9    

       

PNEUMONIA, UNSPECIFIED ORGANISM                    

 

             2018           Alesha Paris           W            486      

     

PNEUMONIA, ORGANISM UNSPECIFIED                    

 

             2018           Alesha Paris           W            I48.9    

       

UNSPECIFIED ATRIAL FIBRILLATION AND ATRIAL FLUTTER                    

 

             2018           Alesha Paris           W            J18.9    

       

PNEUMONIA, UNSPECIFIED ORGANISM                    

 

             08/10/2018           Mendez Parisi           W            041.12   

              

                                                 

 

             08/10/2018           Wellington Alesha           W            276.51   

              

                                                 

 

             08/10/2018           Wellington Alesha           W            303.90   

              

                                                 

 

             08/10/2018           Wellington Alesha           W            338.2    

       CHRONIC

PAIN                                             

 

             08/10/2018           Wellington Alesha           W            401.0    

              

                                                 

 

             08/10/2018           Wellington Alesha           W            427.31   

              

                                                 

 

             08/10/2018           Wellington Alesha           W            486      

     

PNEUMONIA, ORGANISM UNSPECIFIED                    

 

             08/10/2018           Wellington Alesha           W            600.20   

        BENIGN

LOCALIZED HYPERPLASIA OF PROSTATE WITHOUT URINARY OBSTRUCTION AND OTHER LOWER 
URINARY TRACT SYMPTOMS (LUTS)                    

 

             08/10/2018           Alesha Paris           A            771.83   

              

                                                 

 

             08/10/2018           Wellington Alesha           W            780.60   

              

                                                 

 

             08/10/2018           Wellington Alesha           W            780.79   

        OTHER 

MALAISE AND FATIGUE                              

 

             08/10/2018           Mendez Parisi           W            B95.62   

              

                                                 

 

             08/10/2018           Wellington Alesha           W            E86.0    

       

DEHYDRATION                                      

 

             08/10/2018           [place] Alesha           W            F10.20   

        

ALCOHOL DEPENDENCE, UNCOMPLICATED                    

 

             08/10/2018           Mendez Parisi           W            G89.29   

        OTHER 

CHRONIC PAIN                                     

 

             08/10/2018           Paris, Alesha           W            I10      

     ESSENTIAL

(PRIMARY) HYPERTENSION                           

 

             08/10/2018           Alesha Paris           W            I48.9    

       

UNSPECIFIED ATRIAL FIBRILLATION AND ATRIAL FLUTTER                    

 

             08/10/2018           Alesha Paris           W            I48.91   

        

UNSPECIFIED ATRIAL FIBRILLATION                    

 

             08/10/2018           Alesha Paris           W            J18.9    

       

PNEUMONIA, UNSPECIFIED ORGANISM                    

 

             08/10/2018           Alesha Paris           W            N40.0    

       BENIGN 

PROSTATIC HYPERPLASIA WITHOUT LOWER URINRY TRACT SYMP                    

 

             08/10/2018           Alesha Paris           W            R50.9    

       FEVER, 

UNSPECIFIED                                      

 

             08/10/2018           Alesha Paris           W            R53.81   

        OTHER 

MALAISE                                          

 

             08/10/2018           Alesha Paris           A            R78.81   

              

                                                 

 

             2018           Alesha Paris           W            041.12   

              

                                                 

 

             2018           Alesha Paris           W            303.90   

        OTHER 

AND UNSPECIFIED ALCOHOL DEPENDENCE, UNSPECIFIED DRINKING BEHAVIOR               

    

 

             2018           Alesha Paris           W            401.0    

       

MALIGNANT ESSENTIAL HYPERTENSION                    

 

             2018           Alesha Paris           W            427.31   

        ATRIAL

FIBRILLATION                                     

 

             2018           Alesha Paris           W            486      

              

                                                 

 

             2018           Alesha Paris           W            530.81   

        

ESOPHAGEAL REFLUX                                

 

             2018           Mendez Parisi           A            771.83   

              

                                                 

 

             2018           Wellington Alesha           W            780.79   

        OTHER 

MALAISE AND FATIGUE                              

 

             2018           Alesha Paris           W            B95.62   

        

METHICILLIN RESIS STAPH INFCT CAUSING DISEASES CLASSD ELSWHR                    

 

             2018           Alesha Paris           W            F10.20   

        

ALCOHOL DEPENDENCE, UNCOMPLICATED                                               

 

             2018           Alesha Paris           W            I10      

     ESSENTIAL

(PRIMARY) HYPERTENSION                                                

 

             2018           Alesha Paris           W            I48.91   

        

UNSPECIFIED ATRIAL FIBRILLATION                                                 

 

             2018           Alesha Paris           W            J18.9    

       

PNEUMONIA, UNSPECIFIED ORGANISM                                                 

 

             2018           Alesha Paris           W            K21.9    

       GASTRO-

ESOPHAGEAL REFLUX DISEASE WITHOUT ESOPHAGITIS                            

 

             2018           Alesha Paris           W            R53.81   

        OTHER 

MALAISE                                                                   

 

             2018           Wellington Alesha           A            R78.81   

        

BACTEREMIA                                       

 

             2019           Alesha Paris           W            599.0    

       URINARY

TRACT INFECTION, SITE NOT SPECIFIED                    

 

             2019           Alesha Paris           W            N39.0    

       URINARY

TRACT INFECTION, SITE NOT SPECIFIED                    

 

             2019           Alesha Paris           W            427.31   

        ATRIAL

FIBRILLATION                                     

 

             2019           Alesha Paris           W            599.0    

       URINARY

TRACT INFECTION, SITE NOT SPECIFIED                    

 

             2019           Alesha Paris           W            A41      

     OTHER 

SEPSIS                                           

 

             2019           Alesha Paris           W            F10.23   

        

ALCOHOL DEPENDENCE WITH WITHDRAWAL                    

 

             2019           Alesha Paris           W            I48.2    

       CHRONIC

ATRIAL FIBRILLATION                              

 

             2019           Alesha Paris           W            N39.0    

       URINARY

TRACT INFECTION, SITE NOT SPECIFIED                    

 

             2019           Alesha Paris           W            038.9    

       

UNSPECIFIED SEPTICEMIA                           

 

             2019           Alesha Paris           W            276.51   

              

                                                 

 

             2019           Mendez Parisi           W            287.5    

              

                                                 

 

             2019           Alesha Paris           W            291.81   

              

                                                 

 

             2019           Alesha Paris           W            427.31   

        ATRIAL

FIBRILLATION                                     

 

             2019           Alesha Paris           W            599.0    

       URINARY

TRACT INFECTION, SITE NOT SPECIFIED                    

 

             2019           Alesha Paris           W            599.71   

              

                                                 

 

             2019           Alesha Paris           W            600.20   

              

                                                 

 

             2019           Mendez Parisi           W            781.99   

              

                                                 

 

             2019           Alesha Paris           W            797      

              

                                                 

 

             2019           Alesha Paris           W            A41.9    

       SEPSIS,

UNSPECIFIED ORGANISM                             

 

             2019           Alesha Paris           W            D69.6    

              

                                                 

 

             2019           Alesha Paris           W            E86.0    

       

DEHYDRATION                                                                     

 

             2019           Alesha Paris           W            F10.23   

        

ALCOHOL DEPENDENCE WITH WITHDRAWAL                    

 

             2019           Alesha Paris           W            F10.230  

         

ALCOHOL DEPENDENCE WITH WITHDRAWAL, UNCOMPLICATED                               

 

             2019           Alesha Paris           W            I48.2    

       CHRONIC

ATRIAL FIBRILLATION                              

 

             2019           Alesha Paris           W            N39.0    

       URINARY

TRACT INFECTION, SITE NOT SPECIFIED                    

 

             2019           Alesha Paris           W            N40.0    

              

                                                 

 

             2019           Alesha Paris           W            R29.6    

       

REPEATED FALLS                                                                  

 

             2019           Alesha Paris           W            R31.0    

       GROSS 

HEMATURIA                                                                 

 

             2019           Alesha Paris           W            R54      

     AGE-

RELATED PHYSICAL DEBILITY                                                   

 

             2019           Alesha Paris           W            V58.69   

        LONG-

TERM (CURRENT) USE OF OTHER MEDICATIONS                    

 

             2019           Alesha Paris           W            Z79.899  

         OTHER

LONG TERM (CURRENT) DRUG THERAPY                                          

 

             2019           Blair, Tavia           W            303.90 

          

OTHER AND UNSPECIFIED ALCOHOL DEPENDENCE, UNSPECIFIED DRINKING BEHAVIOR         
                                                 

 

             2019           BlairTavia           W            401.0  

              

                                                 

 

             2019           BlairTavia           W            427.32 

          

ATRIAL FLUTTER                                   

 

             2019           BlairTavia           W            724.2  

         

LUMBAGO                                          

 

             2019           BlairTavia           W            780.79 

              

                                                 

 

             2019           BlairTavia           W            781.2  

              

                                                 

 

             2019           BlairTavia           W            F10.20 

          

ALCOHOL DEPENDENCE, UNCOMPLICATED                                               

 

             2019           BlairTavia           W            I10    

       

ESSENTIAL (PRIMARY) HYPERTENSION                                                

 

             2019           Blair, Tavia           W            I48.2  

         

CHRONIC ATRIAL FIBRILLATION                                                     

 

             2019           BlairTavia           W            M54.5  

         LOW 

BACK PAIN                                                                   

 

             2019           BlairTavia           W            R26.89 

          

OTHER ABNORMALITIES OF GAIT AND MOBILITY                                        

 

             2019           BlairTavia           W            R53.1  

         

WEAKNESS                                         

 

             2019           BlairTavia           W            V15.88 

          

PERSONAL HISTORY OF FALL                         

 

             2019           Tavia Blair           W            V45.4  

         

POSTSURGICAL ARTHRODESIS STATUS                    

 

             2019           BlairTavia           W            Z91.81 

          

HISTORY OF FALLING                                                              

 

             2019           Tavia Blair           W            Z98.1  

         

ARTHRODESIS STATUS                                                              

 

             2019           DONATO BOGGS           W            784.7

           

EPISTAXIS                                        

 

             2019           DONATO BOGGS           W            R04.0

           

EPISTAXIS                                                                       

 

             05/15/2019           Arnoldo Beltran            V58.30  

         

ENCOUNTER FOR CHANGE OR REMOVAL OF NONSURGICAL WOUND DRESSING                   



 

             05/15/2019           Arnoldo Beltran           W            Z48.00  

         

ENCOUNTER FOR CHANGE OR REMOVAL OF NONSURG WOUND DRESSING                       

 

             2019           LEISURE, LYNIETA           W            784.7 

          

EPISTAXIS                                        

 

             2019           LEISURE, LYNIETA           W            R04.0 

          

EPISTAXIS                                        

 

             2019           LEISURE, LYNIETA           W            784.7 

          

EPISTAXIS                                        

 

             2019           LEISURE, LYNIETA           W            R04.0 

          

EPISTAXIS                                        

 

             2019           LEISURE, LYNIETA           W            784.7 

          

EPISTAXIS                                        

 

             2019           LEISURE, LYNIETA           W            R04.0 

          

EPISTAXIS                                        

 

             2019           Arnoldo Beltran            564.00  

         

CONSTIPATION, UNSPECIFIED                        

 

             2019           Arnoldo Beltran            B95.62  

         

METHICILLIN RESIS STAPH INFCT CAUSING DISEASES CLASSD ELSWHR                    

 

             2019           Arnoldo Beltran            D62     

      ACUTE 

POSTHEMORRHAGIC ANEMIA                           

 

             2019           Arnoldo Beltran            D69.6   

        

THROMBOCYTOPENIA, UNSPECIFIED                    

 

             2019           Arnoldo Beltran            E86.0   

        

DEHYDRATION                                      

 

             2019           Arnoldo Beltran            F10.20  

         

ALCOHOL DEPENDENCE, UNCOM                        

 

             2019           Arnoldo Beltran            F10.230 

          

ALCOHOL DEPENDENCE WITH WITHDRAWAL, UNCOMPLICATED                    

 

             2019           Arnoldo Beltran            G89.29  

         OTHER

CHRONIC PAIN                                     

 

             2019           Arnoldo Beltran            I10     

      

ESSENTIAL (PRIMARY) HYPERTENSION                    

 

             2019           Arnoldo Beltran            I48.2   

        

CHRONIC ATRIAL FIBRILLATION                      

 

             2019           Arnoldo Beltran            I48.91  

         

UNSPECIFIED ATRIAL FIBRILLATION                    

 

             2019           Arnoldo Beltran            J18.9   

        

PNEUMONIA, UNSPECIFIED ORGANISM                    

 

             2019           Arnoldo Beltran            K59.00  

         

CONSTIPATION, UNSPECIFIED                                                       

 

             2019           Arnoldo Beltran            M54.5   

        LOW 

BACK PAIN                                        

 

             2019           Arnoldo Beltran            N39.0   

        

URINARY TRACT INFECTION, SITE NOT SPECIFIED                    

 

             2019           Arnoldo Beltran            N40.0   

        BENIGN

PROSTATIC HYPERPLASIA WITHOUT LOWER URINRY TRACT SYMP                    

 

             2019           Aronldo Beltran            R04.0   

        

EPISTAXIS                                        

 

             2019           Arnoldo Beltran            R29.6   

        

REPEATED FALLS                                   

 

             2019           Arnoldo Beltran            R31.0   

        GROSS 

HEMATURIA                                        

 

             2019           Arnoldo Beltran            R50.9   

        FEVER,

UNSPECIFIED                                      

 

             2019           Arnoldo Beltran            R53.1   

        

WEAKNESS                                         

 

             2019           Arnoldo Beltran            R53.81  

         OTHER

MALAISE                                          

 

             2019           Arnoldo Beltran            Z86.711 

          

PERSONAL HISTORY OF PULMONARY EMBOLISM                    

 

             2019           Arnoldo Beltran            Z91.81  

         

HISTORY OF FALLING                               

 

             2019           Arnoldo Beltran            Z98.1   

        

ARTHRODESIS STATUS                               

 

             2019           LUISANA REESE DO           Ot           K66

.8           

OTHER SPECIFIED DISORDERS OF PERITONEUM                    

 

             2019           LUISANA REESE DO           Ot           K66

.8           

OTHER SPECIFIED DISORDERS OF PERITONEUM                    

 

                2019           LUISANA REESE DO           Ot           

   M48.061         

                          SPINAL STENOSIS, LUMBAR REGION WITHOUT N              

      

 

             2019           LUISANA REESE DO           Ot           Z98

.1           

ARTHRODESIS STATUS                               

 

             10/10/2019           LUISANA REESE DO           Ot           K66

.8           

OTHER SPECIFIED DISORDERS OF PERITONEUM                    

 

             2019           ARTHUR APRJEREMIAS STORMY           W            B95

.62           

METHICILLIN RESIS STAPH INFCT CAUSING DISEASES CLASSD ELSWHR                    

 

             2019           ARTHUR APRJEREMIAS STORMY           W            D62

           

ACUTE POSTHEMORRHAGIC ANEMIA                     

 

             2019           ARTHUR APRJEREMAIS STORMY           W            D69

.6           

THROMBOCYTOPENIA, UNSPECIFIED                    

 

             2019           ARTHUR APRJEREMIAS STORMY           W            E86

.0           

DEHYDRATION                                      

 

             2019           ARTHUR TREVOR STORMY           W            F10

.20           

ALCOHOL DEPENDENCE, UNCOMPLICATED                    

 

                2019           ARTHUR APRJEREMIAS STORMY           W            

   F10.230          

                          ALCOHOL DEPENDENCE WITH WITHDRAWAL, UNCOMPLICATED     

               

 

             2019           ARTHUR APRJEREMIAS STORMY           W            G89

.29           

OTHER CHRONIC PAIN                               

 

             2019           ARTHUR APRJEREMIAS STORMY           W            I10

           

ESSENTIAL (PRIMARY) HYPERTENSION                    

 

             2019           ARTHUR APRJEREMIAS STORMY           W            I48

.2           

CHRONIC ATRIAL FIBRILLATION                      

 

             2019           ARTHUR APRJEREMIAS STORMY           W            I48

.91           

UNSPECIFIED ATRIAL FIBRILLATION                    

 

             2019           ARTHUR APRJEREMIAS STORMY           W            J18

.9           

PNEUMONIA, UNSPECIFIED ORGANISM                    

 

             2019           ARTHUR APRJEREMIAS STORMY           W            M54

.5           

LOW BACK PAIN                                    

 

             2019           ARTHUR APRJEREMIAS STORMY           W            N39

.0           

URINARY TRACT INFECTION, SITE NOT SPECIFIED                    

 

             2019           ARTHUR APRJEREMIAS STORMY           W            N40

.0           

BENIGN PROSTATIC HYPERPLASIA WITHOUT LOWER URINRY TRACT SYMP                    

 

             2019           ARTHUR APRJEREMIAS STORMY           W            R04

.0           

EPISTAXIS                                        

 

             2019           ARTHUR APRJEREMIAS STORMY           W            R29

.6           

REPEATED FALLS                                   

 

             2019           ARTHUR APRJEREMIAS STORMY           W            R31

.0           

GROSS HEMATURIA                                  

 

             2019           ARTHUR APRJEREMIAS STORMY           W            R50

.9           

FEVER, UNSPECIFIED                               

 

             2019           ARTHUR APRJEREMIAS STORMY           W            R53

.1           

WEAKNESS                                         

 

             2019           ARTHUR APRJEREMIAS STORMY           W            R53

.81           

OTHER MALAISE                                    

 

                2019           ARTHURDAJUAN MONROEJULIUS ROPER            

   Z86.711          

                          PERSONAL HISTORY OF PULMONARY EMBOLISM                

    

 

             2019           ARTHURDAJUAN MONROEJULIUS           W            Z91

.81           

HISTORY OF FALLING                               

 

             2019           ARTHUR APRJEREMIAS CHOLO           W            Z98

.1           

ARTHRODESIS STATUS                               

 

             2019           Alesha Paris           W            G93.4    

       OTHER 

AND UNSPECIFIED ENCEPHALOPATHY                    

 

             2019           Alesha Paris           W            B95.62   

        

METHICILLIN RESIS STAPH INFCT CAUSING DISEASES CLASSD ELSWHR                    

 

             2019           Alesha Paris           W            D62      

     ACUTE 

POSTHEMORRHAGIC ANEMIA                           

 

             2019           Alesha Paris           W            D69.6    

       

THROMBOCYTOPENIA, UNSPECIFIED                    

 

             2019           Alesha Paris           W            E86.0    

       

DEHYDRATION                                      

 

             2019           Alesha Paris           W            F10.20   

        

ALCOHOL DEPENDENCE, UN                           

 

             2019           Alesha Paris           W            F10.230  

         

ALCOHOL DEPENDENCE WITH WITHDRAWAL, UNCOMPLICATED                    

 

             2019           Alesha Paris           W            G89.29   

        OTHER 

CHRONIC PAIN                                     

 

             2019           Alesha Paris           W            G93.4    

       OTHER 

AND UNSPECIFIED ENCEPHALOPATHY                    

 

             2019           Alesha Paris           W            I10      

     ESSENTIAL

(PRIMARY) HYPERTENSION                           

 

             2019           Alesha Paris           W            I48.2    

       CHRONIC

ATRIAL FIBRILLATION                              

 

             2019           Alesha Paris           W            I48.91   

        

UNSPECIFIED ATRIAL FIBRILLATION                    

 

             2019           Alesha Paris           W            J18.9    

       

PNEUMONIA, UNSPECIFIED ORGANISM                    

 

             2019           Alesha Paris           W            N39.0    

       URINARY

TRACT INFECTION, SITE NOT SPECIFIED                    

 

             2019           Alesha Paris           W            N40.0    

       BENIGN 

PROSTATIC HYPERPLASIA WITHOUT LOWER URINRY TRACT SYMP                    

 

             2019           Alesha Paris           W            R04.0    

       

EPISTAXIS                                        

 

             2019           Alesha Paris           W            R26.89   

        OTHER 

ABNORMALITIES OF GAIT AND MOBILITY                    

 

             2019           Alesha Paris           W            R29.6    

       

REPEATED FALLS                                   

 

             2019           Alesha Paris           W            R31.0    

       GROSS 

HEMATURIA                                        

 

             2019           Alesha Paris           W            R50.9    

       FEVER, 

UNSPECIFIED                                      

 

             2019           Alesha Prais           W            R53.1    

       

WEAKNESS                                         

 

             2019           Alesha Paris           W            R53.81   

        OTHER 

MALAISE                                          

 

             2019           Alesha Paris           W            Z86.711  

         

PERSONAL HISTORY OF PULMONARY EMBOLISM                    

 

             2019           ParisMendezjohny ROPER            Z91.81   

        

HISTORY OF FALLING                               

 

             2019           [place] ROPER            Z98.1    

       

ARTHRODESIS STATUS                               

 

             2020           OBI CORRAL            291

.89           

OTHER                                            

 

             2020           OBI CORRAL            296

.34           

MAJOR DEPRESSIVE DISORDER, RECURRENT EPISODE, SEVERE DEG                    

 

             2020           OBI CORRAL            427

.31           

ATRIAL FIBRILLATION                              

 

             2020           OBI CORRAL            780

.52           

I                                                

 

             2020           OBI CORRAL            D62

           

ACUTE POSTHEMORRHAGIC ANEMIA                     

 

             2020           OBI CORRAL            D69

.6           

THROMBOCYTOPENIA, UNSPECIFIED                    

 

             2020           OBI CORRAL            E86

.0           

DEHYDRATION                                      

 

             2020           NATALIIAGLOBI FIGUEREDO            F10

.14           

ALCOHOL ABUSE WITH ALCOHOL-INDUCED MOOD DISORDER                                

 

             2020           OBI CORRAL            F10

.20           

ALCOHOL DEPENDENCE, UNCOMPLICATED                    

 

                2020           TARTAGLOBI FIGUEREDO            

   F10.230          

                          ALCOHOL DEPENDENCE WITH WITHDRAWAL, UNCOMPLICATED     

               

 

             2020           NATALIIAGLOBI FIGUEREDO            F33

.3           

MAJOR DEPRESSV DISORDER, RECURRENT, SEVERE W PSYCH SYMPTOMS                    

 

             2020           OBI CORRAL            G47

.00           

INSOMNIA, UNSPECIFIED                                                           

 

             2020           OBI CORRAL            I10

           

ESSENTIAL (PRIMARY) HYPERTENSION                    

 

             2020           OBI CORRAL            I48

.2           

CHRONIC ATRIAL FIBRILLATION                      

 

             2020           OBI CORRAL            I48

.91           

UNSPECIFIED ATRIAL FIBRILLATION                    

 

             2020           OBI CORRAL            J18

.9           

PNEUMONIA, UNSPECIFIED ORGANISM                    

 

             2020           OBI CORRAL            N40

.0           

BENIGN PROSTATIC HYPERPLASIA WITHOUT LOWER URINRY TRACT SYMP                    

 

             2020           OBI CORRAL            R04

.0           

EPISTAXIS                                        

 

             2020           OBI CORRAL                        R29

.6           

REPEATED FALLS                                   

 

             2020           OBI CORRAL            R31

.0           

GROSS HEMATURIA                                  

 

             2020           OBI CORRAL            R50

.9           

FEVER, UNSPECIFIED                               

 

             2020           OBI CORRAL            R53

.1           

WEAKNESS                                         

 

             2020           OBI CORRAL            R53

.81           

OTHER MALAISE                                    

 

             2020           OBI CORRAL            V58

.61           

LONG-TERM (CURRENT)                              

 

             2020           OBI CORRAL            Z79

.01           

LONG TERM (CURRENT) USE OF ANTICOAGULANTS                                       

 

                2020           OBI CORRAL            

   Z86.711          

                          PERSONAL HISTORY OF PULMONARY EMBOLISM                

    

 

             2020           OBI CORRAL            Z91

.81           

HISTORY OF FALLING                               

 

             2020           OBI CORRAL            Z98

.1           

ARTHRODESIS STATUS                               



                                                                                
                                                                                
                                                                                
                                                                                
                                                                                
                                                                 



Procedures

      



There is no data.                  



Results

      



                    Test                Result              Range        

 

                                        Digoxin - 18 16:24         

 

                    Digoxin             0.3 ng/mL           0.5-1.5        

 

                                        Urinalysis - 18 18:02         

 

                    Icotest             Negative            Negative        

 

                    Urine Volume           Urine Volume Sufficient (10mL)       

              

 

                    Urine Yeast           No Yeast present                     

 

                    Urine-Appearance           Slightly Cloudy            Clear 

       

 

                    Urine-Bacteria           Trace                        

 

                    Urine-Bilirubin           1+                  Negative      

  

 

                    Urine-Blood           Negative            Negative        

 

                    Urine-Color           Yellow              Colorless-[place]

ow        

 

                    Urine-Epithelial Cells           0-5/HPF                    

  

 

                    Urine-Glucose           Negative            Negative        

 

                    Urine-Ketones           Negative            Negative        

 

                    Urine-Leukocytes           Negative            Negative     

   

 

                    Urine-Mucus           3+                           

 

                    Urine-Nitrite           Negative            Negative        

 

                          Urine-Other                Urine Saved if Culture Need

ed (48hrs from time of 

collection)                                      

 

                    Urine-pH            5.5                 5-8.5        

 

                    Urine-Protein           1+                  Negative        

 

                    Urine-RBC           0-2/HPF                      

 

                    Urine-Specific Gravity           1.025               1.000-1

.030        

 

                    Urine-WBC           Rare/HPF                     

 

                    Urobilinogen           1.0                 0.2-1.0        

 

                                        Mycoplasma - 18 18:55         

 

                    Mycoplasma           Negative            Negative        

 

                                        Blood Culture - 18 18:55         

 

                          PRELIM CULTURE RESULTS           Blood Culture POSITIV

E, Growth Day [id number] 

Positive Cocci noted on Gram Stain, Further Testing Pending                     

 

                    MEDIA PLATED           Blood Culture Media Position C46     

                

 

                    CULTURE SOURCE           right ac                     

 

                                        Sensi - 18 18:55         

 

                          FINAL CULTURE RESULTS           Methicillin Resistant 

Staphylococcus aureus 

(Isolate 1)                                      

 

                    Ampicillin/Sulbactam           <=8/4                        

 

                    Ampicillin           >8                           

 

                    Amoxicillin/K Clavulanate           >4/2                    

     

 

                    Ceftriaxone           >32                          

 

                    Clindamycin           <=0.5                        

 

                    Cefoxitin Screen           >4                           

 

                    Ciprofloxacin           <=1                          

 

                    Daptomycin           <=0.5                        

 

                    Erythromycin           >4                           

 

                    Nitrofurantoin           <=32                         

 

                    Gentamicin           <=4                          

 

                    Gentamicin Synergy Screen           N/R                     

     

 

                    Inducible Clindamycin           <=4/0.5                     

 

 

                    Levofloxacin           <=1                          

 

                    Linezolid           <=1                          

 

                    Moxifloxacin           <=0.5                        

 

                    Oxacillin           >2                           

 

                    Penicillin           >8                           

 

                    Rifampin            <=1                          

 

                    Streptomycin Synergy           N/R                          

 

                    Synercid            <=0.5                        

 

                    Trimethoprim/ Sulfamethoxazole           <=0.5/9.5          

           

 

                    Tetracycline           <=4                          

 

                    Vancomycin           1                            

 

                                        Blood Culture - 18 18:55         

 

                          PRELIM CULTURE RESULTS           Blood Culture POSITIV

E, Growth Day [id number] 

Negative Rods seen on Gram Stain, Further Testing is pending                    



 

                          FINAL CULTURE RESULTS           MRSA, same MAHSA as othe

r blood culture.          

                                                 

 

                    MEDIA PLATED           Blood Culture Media Position C50     

                

 

                    CULTURE SOURCE           left ac                      

 

                                        BMP - 18 05:41         

 

                    Anion Gap           19                  6-14        

 

                    BUN                 17 mg/dL            5-25        

 

                    Calcium             8.8 mg/dL           8.3-10.4        

 

                    Chloride            102 mmol/L                   

 

                    CO2                 21 mEq/L            22-33        

 

                    Creat               0.88 mg/dL           0.50-1.50        

 

                    eGFR                84 mL/min/1.73m2           >59        

 

                    Glucose             111 mg/dL                   

 

                    Osmo                285                 280-295        

 

                    Potassium           4.5 mmol/L           3.5-5.3        

 

                    Sodium              137 mmol/L           134-148        

 

                                        Comprehensive Metabolic Panel - 18

 05:15         

 

                    Albumin             3.4 g/dL            3.6-5.1        

 

                    ALP                 68 U/L                      

 

                    ALT                 23 U/L              6-45        

 

                    Anion Gap           16                  6-14        

 

                    AST                 24 U/L              2-40        

 

                    BUN                 16 mg/dL            5-25        

 

                    Calcium             8.5 mg/dL           8.3-10.4        

 

                    Chloride            102 mmol/L                   

 

                    CO2                 22 mEq/L            22-33        

 

                    Creat               0.86 mg/dL           0.50-1.50        

 

                    eGFR                87 mL/min/1.73m2           >59        

 

                    Globulin            2.5 g/dL            2.3-3.5        

 

                    Glucose             120 mg/dL                   

 

                    Osmo                281                 280-295        

 

                    Potassium           4.5 mmol/L           3.5-5.3        

 

                    Sodium              135 mmol/L           134-148        

 

                    TBil                1.0 mg/dL           0.2-1.2        

 

                    TP                  5.9 g/dL            6.0-8.3        

 

                                        Vancomycin Trough - 18 09:00      

   

 

                    Vanco Trough           7.5 ug/mL           10.0-20.0        

 

                                        Vancomycin Trough - 18 09:51      

   

 

                    Vanco Trough           12.5 ug/mL           10.0-20.0       

 

 

                                        Comprehensive Metabolic Panel - 18

 11:10         

 

                    Albumin             3.1 g/dL            3.6-5.1        

 

                    ALP                 83 U/L                      

 

                    ALT                 20 U/L              6-45        

 

                    Anion Gap           16                  6-14        

 

                    AST                 25 U/L              2-40        

 

                    BUN                 8 mg/dL             5-25        

 

                    Calcium             8.7 mg/dL           8.3-10.4        

 

                    Chloride            103 mmol/L                   

 

                    CO2                 22 mEq/L            22-33        

 

                    Creat               0.72 mg/dL           0.50-1.50        

 

                    eGFR                106 mL/min/1.73m2           >59        

 

                    Globulin            2.3 g/dL            2.3-3.5        

 

                    Glucose             135 mg/dL                   

 

                    Osmo                281                 280-295        

 

                    Potassium           4.5 mmol/L           3.5-5.3        

 

                    Sodium              136 mmol/L           134-148        

 

                    TBil                0.9 mg/dL           0.2-1.2        

 

                    TP                  5.4 g/dL            6.0-8.3        

 

                                        Vancomycin Trough - 18 11:00      

   

 

                    Vanco Trough           15.8 ug/mL           10.0-20.0       

 

 

                                        Comprehensive Metabolic Panel - 18

 05:51         

 

                    Albumin             3.4 g/dL            3.6-5.1        

 

                    ALP                 77 U/L                      

 

                    ALT                 19 U/L              6-45        

 

                    Anion Gap           14                  6-14        

 

                    AST                 21 U/L              2-40        

 

                    BUN                 4 mg/dL             5-25        

 

                    Calcium             9.3 mg/dL           8.3-10.4        

 

                    Chloride            103 mmol/L                   

 

                    CO2                 25 mEq/L            22-33        

 

                    Creat               0.82 mg/dL           0.50-1.50        

 

                    eGFR                92 mL/min/1.73m2           >59        

 

                    Globulin            2.7 g/dL            2.3-3.5        

 

                    Glucose             148 mg/dL                   

 

                    Osmo                285                 280-295        

 

                    Potassium           4.2 mmol/L           3.5-5.3        

 

                    Sodium              138 mmol/L           134-148        

 

                    TBil                0.9 mg/dL           0.2-1.2        

 

                    TP                  6.1 g/dL            6.0-8.3        

 

                                        Digoxin - 19 13:43         

 

                    Digoxin             0.6 ng/mL           0.5-1.5        

 

                                        Rapid Drug Screen + ETOH,Medical -  13:43         

 

                    Amphetamine           NEGATIVE            NEGATIVE        

 

                    Barbiturates           NEGATIVE            NEGATIVE        

 

                    Benzodiazepines           NEGATIVE            NEGATIVE      

  

 

                    Cocaine             NEGATIVE            NEGATIVE        

 

                    Ethanol, Urine           71.50 mg/dL           20.00-80.00  

      

 

                    Marijuana           NEGATIVE            NEGATIVE        

 

                    Methylenedioxymethamphetamine           NEGATIVE            

NEGATIVE        

 

                    Opiates             NEGATIVE            NEGATIVE        

 

                    Oxycodone           NEGATIVE            NEGATIVE        

 

                    Phencyclidine           NEGATIVE            NEGATIVE        

 

                    Propoxyphene           NEGATIVE            NEGATIVE        

 

                    Tricyclic Antidepressant           NEGATIVE            NEGAT

CRISS        

 

                                        Thyroid Stimulating Hormone - 19 1

3:43         

 

                    TSH                 1.95 mIU/mL           0.32-5.00        

 

                                        Blood Culture - 19 13:43         

 

                          PRELIM CULTURE RESULTS           Blood Culture Negativ

e, No Growth Day 1        

                                                 

 

                          FINAL CULTURE RESULTS           Blood Culture Negative

, No Growth Day 5         

                                                 

 

                    MEDIA PLATED           Blood Culture Media Position B32     

                

 

                    CULTURE SOURCE           left arm                     

 

                                        Urine Culture - 19 13:43         

 

                          PRELIM CULTURE RESULTS           >100,000 Gram Positiv

e MAHSA / ID to Follow      

                                                 

 

                    CULTURE SOURCE           Cath                         

 

                                        Blood Culture - 19 15:58         

 

                          PRELIM CULTURE RESULTS           Blood Culture Negativ

e, No Growth Day 1        

                                                 

 

                          FINAL CULTURE RESULTS           Blood Culture Negative

, No Growth Day 5         

                                                 

 

                    MEDIA PLATED           Blood Culture Media Position C43     

                

 

                    CULTURE SOURCE           Right Hand                     

 

                                        Protime  - 19 16:48         

 

                    INR                 1.1                 1.0-4.0        

 

                    Protime             12.7 Sec            9.9-12.8        

 

                                        Cardiac Panel - 19 19:23         

 

                    CK                  138 U/L                     

 

                    CK-MB               1.3 ng/ml           0.0-9.2        

 

                    Myoglobin           72.5 ng/ml           1.6-154.9        

 

                    Troponin            <0.020 ng/mL           0.0-0.4        

 

                                        EKG - 19 20:34         

 

                    EKG                 Complete                     

 

                                        Comprehensive Metabolic Panel - 19

 05:53         

 

                    Albumin             3.5 g/dL            3.6-5.1        

 

                    ALP                 71 U/L                      

 

                    ALT                 27 U/L              6-45        

 

                    Anion Gap           15                  6-14        

 

                    AST                 68 U/L              2-40        

 

                    BUN                 8 mg/dL             5-25        

 

                    Calcium             8.3 mg/dL           8.3-10.4        

 

                    Chloride            105 mmol/L                   

 

                    CO2                 25 mEq/L            22-33        

 

                    Creat               0.87 mg/dL           0.50-1.50        

 

                    eGFR                85 mL/min/1.73m2           >59        

 

                    Globulin            2.4 g/dL            2.3-3.5        

 

                    Glucose             120 mg/dL                   

 

                    Osmo                291                 280-295        

 

                    Potassium           3.6 mmol/L           3.5-5.3        

 

                    Sodium              141 mmol/L           134-148        

 

                    TBil                2.0 mg/dL           0.2-1.2        

 

                    TP                  5.9 g/dL            6.0-8.3        

 

                                        MRSA Screen - 19 06:47         

 

                    FINAL CULTURE RESULTS           MRSA Negative Nasal Culture 

                    

 

                                        Comprehensive Metabolic Panel - 19

 05:00         

 

                    Albumin             3.2 g/dL            3.6-5.1        

 

                    ALP                 70 U/L                      

 

                    ALT                 21 U/L              6-45        

 

                    Anion Gap           13                  6-14        

 

                    AST                 42 U/L              2-40        

 

                    BUN                 6 mg/dL             5-25        

 

                    Calcium             7.9 mg/dL           8.3-10.4        

 

                    Chloride            103 mmol/L                   

 

                    CO2                 26 mEq/L            22-33        

 

                    Creat               0.89 mg/dL           0.50-1.50        

 

                    eGFR                83 mL/min/1.73m2           >59        

 

                    Globulin            2.3 g/dL            2.3-3.5        

 

                    Glucose             137 mg/dL                   

 

                    Osmo                285                 280-295        

 

                    Potassium           3.6 mmol/L           3.5-5.3        

 

                    Sodium              138 mmol/L           134-148        

 

                    TBil                1.4 mg/dL           0.2-1.2        

 

                    TP                  5.5 g/dL            6.0-8.3        

 

                                        C.difficile, DNA Amplification - 

9 23:36         

 

                          C.difficile, DNA Amplification           NEGATIVE: No 

DNA evidence of toxogenic 

C. difficile detected.                  Negative        

 

                                        Comprehensive Metabolic Panel - 19

 05:10         

 

                    Albumin             3.2 g/dL            3.6-5.1        

 

                    ALP                 66 U/L                      

 

                    ALT                 19 U/L              6-45        

 

                    Anion Gap           12                  6-14        

 

                    AST                 34 U/L              2-40        

 

                    BUN                 10 mg/dL            5-25        

 

                    Calcium             8.0 mg/dL           8.3-10.4        

 

                    Chloride            105 mmol/L                   

 

                    CO2                 25 mEq/L            22-33        

 

                    Creat               0.85 mg/dL           0.50-1.50        

 

                    eGFR                88 mL/min/1.73m2           >59        

 

                    Globulin            2.3 g/dL            2.3-3.5        

 

                    Glucose             118 mg/dL                   

 

                    Osmo                285                 280-295        

 

                    Potassium           3.6 mmol/L           3.5-5.3        

 

                    Sodium              138 mmol/L           134-148        

 

                    TBil                0.9 mg/dL           0.2-1.2        

 

                    TP                  5.5 g/dL            6.0-8.3        

 

                                        Comprehensive Metabolic Panel - 19

 05:20         

 

                    Albumin             3.7 g/dL            3.6-5.1        

 

                    ALP                 70 U/L                      

 

                    ALT                 20 U/L              6-45        

 

                    Anion Gap           15                  6-14        

 

                    AST                 34 U/L              2-40        

 

                    BUN                 8 mg/dL             5-25        

 

                    Calcium             8.9 mg/dL           8.3-10.4        

 

                    Chloride            102 mmol/L                   

 

                    CO2                 26 mEq/L            22-33        

 

                    Creat               0.89 mg/dL           0.50-1.50        

 

                    eGFR                83 mL/min/1.73m2           >59        

 

                    Globulin            2.5 g/dL            2.3-3.5        

 

                    Glucose             112 mg/dL                   

 

                    Osmo                286                 280-295        

 

                    Potassium           3.9 mmol/L           3.5-5.3        

 

                    Sodium              139 mmol/L           134-148        

 

                    TBil                1.1 mg/dL           0.2-1.2        

 

                    TP                  6.2 g/dL            6.0-8.3        

 

                                        Comprehensive Metabolic Panel - 19

 05:15         

 

                    Albumin             3.4 g/dL            3.6-5.1        

 

                    ALP                 61 U/L                      

 

                    ALT                 19 U/L              6-45        

 

                    Anion Gap           13                  6-14        

 

                    AST                 34 U/L              2-40        

 

                    BUN                 7 mg/dL             5-25        

 

                    Calcium             8.6 mg/dL           8.3-10.4        

 

                    Chloride            104 mmol/L                   

 

                    CO2                 25 mEq/L            22-33        

 

                    Creat               0.86 mg/dL           0.50-1.50        

 

                    eGFR                87 mL/min/1.73m2           >59        

 

                    Globulin            2.3 g/dL            2.3-3.5        

 

                    Glucose             98 mg/dL                    

 

                    Osmo                283                 280-295        

 

                    Potassium           3.9 mmol/L           3.5-5.3        

 

                    Sodium              138 mmol/L           134-148        

 

                    TBil                1.0 mg/dL           0.2-1.2        

 

                    TP                  5.7 g/dL            6.0-8.3        

 

                                        VITAMIN D, 25-H - 19 11:19        

 

 

                    VITAMIN D,25-OH,TOTAL,IA           34 ng/mL            30-10

0        

 

                                        Comprehensive Metabolic Panel - 19

 01:20         

 

                    Albumin             3.9 g/dL            3.6-5.1        

 

                    ALP                 60 U/L                      

 

                    ALT                 14 U/L              6-45        

 

                    Anion Gap           14                  6-14        

 

                    AST                 26 U/L              2-40        

 

                    BUN                 11 mg/dL            5-25        

 

                    Calcium             9.8 mg/dL           8.3-10.4        

 

                    Chloride            106 mmol/L                   

 

                    CO2                 22 mEq/L            22-33        

 

                    Creat               0.93 mg/dL           0.50-1.50        

 

                    eGFR                79 mL/min/1.73m2           >59        

 

                    Globulin            3.1 g/dL            2.3-3.5        

 

                    Glucose             137 mg/dL                   

 

                    Osmo                287                 280-295        

 

                    Potassium           4.0 mmol/L           3.5-5.3        

 

                    Sodium              138 mmol/L           134-148        

 

                    TBil                0.6 mg/dL           0.2-1.2        

 

                    TP                  7.0 g/dL            6.0-8.3        

 

                                        HH - 19 16:01         

 

                    Hct                 40.5 %              42.0-52.0        

 

                    Hgb                 14.3 g/dL           14.0-17.0        

 

                                        Ethanol - 19 13:03         

 

                    Ethanol             294 mg/dL           20-80        

 

                                        Thyroid Stimulating Hormone - 19 1

6:44         

 

                    TSH                 1.22 mIU/mL           0.32-5.00        

 

                                        Rapid Drug Screen + ETOH,Medical -  18:41         

 

                    Amphetamine           NEGATIVE            NEGATIVE        

 

                    Barbiturates           NEGATIVE            NEGATIVE        

 

                    Benzodiazepines           POSITIVE            NEGATIVE      

  

 

                    Cocaine             NEGATIVE            NEGATIVE        

 

                    Ethanol, Urine           304.70 mg/dL           20.00-80.00 

       

 

                    Marijuana           NEGATIVE            NEGATIVE        

 

                    Methylenedioxymethamphetamine           NEGATIVE            

NEGATIVE        

 

                    Opiates             NEGATIVE            NEGATIVE        

 

                    Oxycodone           NEGATIVE            NEGATIVE        

 

                    Phencyclidine           NEGATIVE            NEGATIVE        

 

                    Propoxyphene           NEGATIVE            NEGATIVE        

 

                    Tricyclic Antidepressant           NEGATIVE            NEGAT

CRISS        

 

                                        Comprehensive Metabolic Panel - 19

 05:47         

 

                    Albumin             3.6 g/dL            3.6-5.1        

 

                    ALP                 52 U/L                      

 

                    ALT                 36 U/L              6-45        

 

                    Anion Gap           18                  6-14        

 

                    AST                 74 U/L              2-40        

 

                    BUN                 7 mg/dL             5-25        

 

                    Calcium             8.0 mg/dL           8.3-10.4        

 

                    Chloride            108 mmol/L                   

 

                    CO2                 21 mEq/L            22-33        

 

                    Creat               0.74 mg/dL           0.50-1.50        

 

                    eGFR                103 mL/min/1.73m2           >59        

 

                    Globulin            2.3 g/dL            2.3-3.5        

 

                    Glucose             75 mg/dL                    

 

                    Osmo                292                 280-295        

 

                    Potassium           4.0 mmol/L           3.5-5.3        

 

                    Sodium              143 mmol/L           134-148        

 

                    TBil                1.1 mg/dL           0.2-1.2        

 

                    TP                  5.9 g/dL            6.0-8.3        

 

                                        EKG - 19 11:13         

 

                    EKG                 Complete                     

 

                                        Comprehensive Metabolic Panel - 19

 05:30         

 

                    Albumin             3.3 g/dL            3.6-5.1        

 

                    ALP                 49 U/L                      

 

                    ALT                 27 U/L              6-45        

 

                    Anion Gap           14                  6-14        

 

                    AST                 51 U/L              2-40        

 

                    BUN                 6 mg/dL             5-25        

 

                    Calcium             8.1 mg/dL           8.3-10.4        

 

                    Chloride            108 mmol/L                   

 

                    CO2                 23 mEq/L            22-33        

 

                    Creat               0.76 mg/dL           0.50-1.50        

 

                    eGFR                100 mL/min/1.73m2           >59        

 

                    Globulin            2.1 g/dL            2.3-3.5        

 

                    Glucose             109 mg/dL                   

 

                    Osmo                289                 280-295        

 

                    Potassium           4.0 mmol/L           3.5-5.3        

 

                    Sodium              141 mmol/L           134-148        

 

                    TBil                2.0 mg/dL           0.2-1.2        

 

                    TP                  5.4 g/dL            6.0-8.3        

 

                                        Comprehensive Metabolic Panel - 19

 05:24         

 

                    Albumin             3.3 g/dL            3.6-5.1        

 

                    ALP                 53 U/L                      

 

                    ALT                 23 U/L              6-45        

 

                    Anion Gap           14                  6-14        

 

                    AST                 36 U/L              2-40        

 

                    BUN                 4 mg/dL             5-25        

 

                    Calcium             8.1 mg/dL           8.3-10.4        

 

                    Chloride            108 mmol/L                   

 

                    CO2                 23 mEq/L            22-33        

 

                    Creat               0.75 mg/dL           0.50-1.50        

 

                    eGFR                101 mL/min/1.73m2           >59        

 

                    Globulin            2.2 g/dL            2.3-3.5        

 

                    Glucose             126 mg/dL                   

 

                    Osmo                290                 280-295        

 

                    Potassium           3.9 mmol/L           3.5-5.3        

 

                    Sodium              141 mmol/L           134-148        

 

                    TBil                1.6 mg/dL           0.2-1.2        

 

                    TP                  5.5 g/dL            6.0-8.3        

 

                                        Comprehensive Metabolic Panel - 19

 05:25         

 

                    Albumin             3.3 g/dL            3.6-5.1        

 

                    ALP                 56 U/L                      

 

                    ALT                 20 U/L              6-45        

 

                    Anion Gap           12                  6-14        

 

                    AST                 28 U/L              2-40        

 

                    BUN                 3 mg/dL             5-25        

 

                    Calcium             8.4 mg/dL           8.3-10.4        

 

                    Chloride            108 mmol/L                   

 

                    CO2                 26 mEq/L            22-33        

 

                    Creat               0.77 mg/dL           0.50-1.50        

 

                    eGFR                98 mL/min/1.73m2           >59        

 

                    Globulin            2.3 g/dL            2.3-3.5        

 

                    Glucose             119 mg/dL                   

 

                    Osmo                291                 280-295        

 

                    Potassium           3.9 mmol/L           3.5-5.3        

 

                    Sodium              142 mmol/L           134-148        

 

                    TBil                1.3 mg/dL           0.2-1.2        

 

                    TP                  5.6 g/dL            6.0-8.3        

 

                                        Lipid Panel - 20 05:45         

 

                    C/HDL               2.4                 3.7-6.7        

 

                    Cholesterol           137 mg/dL           100-240        

 

                    HDL                 58 mg/dL            30-85        

 

                    LDL-Calculated           65 mg/dL            0-100        

 

                    Trig                70 mg/dL                    

 

                    VLDL                14 mg/dL            0-42        

 

                                        Urinalysis - 20 14:11         

 

                    Icotest             N/A                 Negative        

 

                    Urine Volume           Urine Volume Sufficient (10mL)       

              

 

                    Urine-Appearance           Clear               Clear        

 

                    Urine-Bacteria           Trace                        

 

                    Urine-Bilirubin           Negative            Negative      

  

 

                    Urine-Blood           3+                  Negative        

 

                    Urine-Color           Yellow              Colorless-Lt. [place]

ow        

 

                    Urine-Epithelial Cells           5-10/HPF                   

  

 

                    Urine-Glucose           Negative            Negative        

 

                    Urine-Ketones           Negative            Negative        

 

                    Urine-Leukocytes           Negative            Negative     

   

 

                    Urine-Nitrite           Negative            Negative        

 

                    Urine-Other           Culture to follow                     

 

                    Urine-pH            6.0                 5-8.5        

 

                    Urine-Protein           Negative            Negative        

 

                    Urine-RBC           20-40/HPF                     

 

                    Urine-Specific Gravity           1.020               1.000-1

.030        

 

                    Urine-WBC           2-5/HPF                      

 

                    Urobilinogen           1.0 E.U./dL            0.2-1.0       

 

 

                                        Urine Culture - 20 14:11         

 

                    PRELIM CULTURE RESULTS           No Growth 24 hours         

            

 

                    FINAL CULTURE RESULTS           No Growth 48 hours          

           

 

                    MEDIA PLATED           Setup at 14:17 on 2020           

          

 

                    CULTURE SOURCE           clean catch from [facility]               

      

 

                                        Comprehensive Metabolic Panel - 20

 05:29         

 

                    Albumin             3.5 g/dL            3.6-5.1        

 

                    ALP                 52 U/L                      

 

                    ALT                 17 U/L              6-45        

 

                    Anion Gap           13                  6-14        

 

                    AST                 20 U/L              2-40        

 

                    BUN                 9 mg/dL             5-25        

 

                    Calcium             9.2 mg/dL           8.3-10.4        

 

                    Chloride            103 mmol/L                   

 

                    CO2                 28 mEq/L            22-33        

 

                    Creat               0.78 mg/dL           0.50-1.50        

 

                    eGFR                97 mL/min/1.73m2           >59        

 

                    Globulin            2.2 g/dL            2.3-3.5        

 

                    Glucose             119 mg/dL                   

 

                    Osmo                289                 280-295        

 

                    Potassium           4.2 mmol/L           3.5-5.3        

 

                    Sodium              140 mmol/L           134-148        

 

                    TBil                0.7 mg/dL           0.2-1.2        

 

                    TP                  5.7 g/dL            6.0-8.3        

 

                                        Ethanol - 20 09:50         

 

                    Ethanol             125 mg/dL           20-80        



                                                                                
                       



Encounters

      



                ACCT No.           Visit Date/Time           Discharge          

 Status         

             Pt. Type           Provider           Facility           Loc./Unit 

          

Complaint        

 

                [id number]           2020 10:16:00           2020 23:59

:00           

DIS             Outpatient           [facility]                         

      

                                                 

 

                [id number]           2020 13:34:00           2020 23:59

:00           

DIS             Outpatient           [facility]                            

      

                                                 

 

                [id number]           2019 11:40:00           2020 12:10

:00           

DIS                 Inpatient           OBI CORRAL [facility]                               

 

                [id number]           2019 15:43:00           2019 13:10

:00           

[facility] 

                          [facility]                                

 

                [id number]           2019 12:09:00           2019 14:30

:00           

DIS                 Outpatient           CHOLO GALLAGHER [facility]                    ER                                 

 

                [id number]           2019 16:10:00           2019 18:10:

00           DIS

                    Outpatient           Arnoldo Beltran           [facility]   

                          ER                                 

 

                [id number]           2019 15:22:00           2019 17:10:

00           DIS

                Outpatient           SHANDA GALLEGOS                           

          

                                                 

 

                [id number]           05/15/2019 17:47:00           05/15/2019 18:30:

00           DIS

             Outpatient           Arnoldo Beltran                                

     

        

 

                [id number]           2019 01:07:00           2019 02:24:

00           DIS

                    Outpatient           DONATO BOGGS           [facility]

                          ER                                 

 

                [id number]           2019 11:06:00           2019 15:14:

00           DIS

                Outpatient           [facility]                            

          

                                                 

 

                [id number]           2019 18:45:00           2019 13:00:

00           DIS

                    Inpatient           [facility]

enter     

                          ICU                                

 

                [id number]           08/10/2018 12:44:00           2018 17:27:

00           DIS

                    Inpatient           [facility]

enter     

                          [facility]                           

 

                [id number]           2018 19:04:00           08/10/2018 13:01:

00           DIS

                    Inpatient           [facility]

enter     

                          ICU                                

 

                [id number]           2018 16:04:00           2018 13:23:

00           DIS

                    Inpatient           [facility]

enter     

                          [facility]                           

 

             [id number]           2020 11:34:03                              

       Document

Registration                                                                    

 

             [id number]           2018 16:56:37                               

      Document 

Registration                                                                    

 

             [id number]           2019 00:55:00                               

      Document 

Registration                                                                    

 

                    [id number]           2019 11:50:00            23:59:59        

                CLS             Outpatient           LUISANA REESE DO        

   [facility]           RAD                       CYST        

 

                    [id number]           2019 15:24:00            23:59:59        

                CLS             Outpatient           LUISANA REESE DO        

   [facility]           RAD                       [place]        

 

                [id number]           2019 14:00:00           2019 23:59:

59           CLS

                Outpatient           TAVIA BLAIR                                             

 

             [id number]           2019 10:40:00                              

       Document

Registration

## 2020-05-29 NOTE — NUR
CM/SS visited with the patient for social service consult. 



Plan: The patient will discharge home 5/30 with home health. 



Home Health: [facility]. The patient was provided with a patient preference form and 
chose [facility] as his first choice; however, this ss called to make a referral and 
they denied the patient due to past history of non-compliance and not staying home. CM/SS 
asked the patient for his second choice and he chose [facility]. CM/SS called and spoke with 
Lea with the agency and made the referral. This ss faxed medical records thus far and 
informed her that the nurse will fax over the home health orders and face to face. She 
verbalized understanding. CM/SS left a note with the patients nurse to pass on during shift 
change about discharge plans and what was needed. She verbalized understanding. The patients 
physician was notified. 



No further needs at this time.

## 2020-05-29 NOTE — XMS REPORT
Continuity of Care Document

                             Created on: 2020



DUKE MONSALVE

External Reference #: [id number]

: 1945

Sex: Male



Demographics





                          Address                   [address]  [address]

 

                          Home Phone                [phone] x

 

                          Preferred Language        Unknown

 

                          Marital Status            Unknown

 

                          [employer] Affiliation     Unknown

 

                          Race                      Unknown

 

                          Ethnic Group              Unknown





Author





                          Organization              Unknown

 

                          Address                   Unknown

 

                          Phone                     Unavailable



              



Allergies

      



             Active           Description           Code           Type         

  Severity   

                Reaction           Onset           Reported/Identified          

 

Relationship to Patient                 Clinical Status        

 

           Yes           AMOXICILLIN                                   MODERATE 

          

DERMATOLOGICAL - [facility]                                                     

        

 

 

           Yes           AMOXICILLIN                                   MODERATE 

          

MODERATE                                                               



                    



Medications

      



                Medication           Packaging           Start Date           St

op Date         

                    Route               Dosage              Sig        

 

                                      OXYCODONE 5MG/APAP 325MG TAB(PERCOCET-5)  

                   TAB    

             2018                                    

         

   PRN Q6H                  

 

                                HYDRALAZINE TAB 25 MG (APRESOLINE)              

       MG                  

2018                                                 

      

PRN Q4H                  

 

                                      ALBUTEROL INHALER MDI 8 GM (VENTOLIN HFA) 

                    

PUFF(S)           2018                               

    

                                                    PRN QID                  

 

                                      Docusate sodium 100mg oral capsule (COLACE

)                     MG  

             2018                                    

       

     BID&0800,2000                  

 

                                METOPROLOL TAB 50 MG (LOPRESSOR)                

     MG                  

2018                                                 

      

BID&0800,2000                  

 

                                VERAPAMIL TAB 80 MG (CALAN)                     

MG                  

2018                                                 

      

Q8H&0600,1400,2200                  

 

                                DOXAZOSIN TAB 2 MG (CARDURA)                    

 MG                  

2018                                                 

      

Daily&0900                  

 

                                VERAPAMIL TAB 80 MG (CALAN)                     

MG                  

2018                                                 

      

Q8H&0600,1400,2200                  

 

                                      Lidocaine adhesive patch 5% (Lidoderm)    

                 PATCH    

             2018           08/10/2018                                    

         

   Daily&0900                  

 

                                CLOPIDOGREL TAB 75 MG (PLAVIX)                  

   MG                  

2018                                                 

      

Daily&0900                  

 

                                DOXAZOSIN TAB 2 MG (CARDURA)                    

 MG                  

2018                                                 

      

Daily&0900                  

 

                                                    ASPIRIN ENTERIC COATED TAB 8

1 MG (BABY ASPIRIN EC)                  

             MG           2018                       

            

                                                    Daily&0900                  

 

                                DIGOXIN TAB 0.125 MG (LANOXIN)                  

   MG                  

2018                                                 

      

Daily&0900                  

 

                                                    LACTULOSE SYRUP LIQ 20 GM/30

CC (CHRONULAC SYRUP)                    

           GM           2018                         

           

          Daily&0900                  

 

                                VERAPAMIL TAB 80 MG (CALAN)                     

MG                  

2018                                                 

      

Q8H&0600,1400,2200                  

 

                                      OXYCODONE 5MG/APAP 325MG TAB(PERCOCET-5)  

                   TAB    

             2018                                    

         

   PRN Q6H                  

 

                                      ALBUTEROL INHALER MDI 8 GM (VENTOLIN HFA) 

                    

PUFF(S)           2018                               

    

                                                    PRN QID                  

 

                                      Docusate sodium 100mg oral capsule (COLACE

)                     MG  

             2018                                    

       

     BID&08,                  

 

                                METOPROLOL TAB 50 MG (LOPRESSOR)                

     MG                  

2018                                                 

      

BID&08,                  

 

                                CLOPIDOGREL TAB 75 MG (PLAVIX)                  

   MG                  

2018                                                 

      

Daily&0900                  

 

                                DOXAZOSIN TAB 2 MG (CARDURA)                    

 MG                  

2018                                                 

      

Daily&0900                  

 

                                                    ASPIRIN ENTERIC COATED TAB 8

1 MG (BABY ASPIRIN EC)                  

             MG           2018                       

            

                                                    Daily&0900                  

 

                                DIGOXIN TAB 0.125 MG (LANOXIN)                  

   MG                  

2018           08/10/2018                                                 

      

Daily&0900                  

 

                                      ACETAMINOPHEN ORAL TABLET 325mg(Tylenol)  

                   MG     

             2018                                    

          

  PRN Q6H                  

 

                                LACTATED RINGERS 500CC IV BAG INJ               

      ml                  

2018                                                 

      

CONTINUOUSEVERY 0 Hour                  

 

                                      OXYCODONE 5MG/APAP 325MG TAB(PERCOCET-5)  

                   TAB    

             2018                                    

         

   PRN Q6H                  

 

                                      ALBUTEROL INHALER MDI 8 GM (VENTOLIN HFA) 

                    

PUFF(S)           2018                               

    

                                                    PRN QID                  

 

                                      Normal SALINE 0.9 % (NS 100cc) (plain bag)

                     ml   

             2018                                    

        

    ONCE&1841                  

 

                                                    IPRATROPIUM/ALBUTEROL INH SO

LN (DUO-NEB INH SOLN)                   

             MLS           2018                      

             

                                                    ONCE&1855                  

 

                                      DIGOXIN  inj 250mcg/cc 2cc amp (Lanoxin)  

                   MCG    

             2018                                    

         

   ONCE&1857                  

 

                                                    NORMAL SALINE 1000CC IV BAG 

INJ 0.9 % (NS 1000CC IV BAG)            

             ml           2018                       

      

                                                    CONTINUOUSEVERY 0 Hour      

            

 

                                CLOPIDOGREL TAB 75 MG (PLAVIX)                  

   MG                  

2018                                                 

      

Daily&                  

 

                                      Docusate sodium 100mg oral capsule (COLACE

)                     MG  

             2018                                    

       

     BID&0800,                  

 

                                                    NORMAL SALINE 250CC IV BAG I

NJ 0.9 % (NS 250CC IV BAG)              

             ml           2018                       

        

                                                    ONCE&                  

 

                                METOPROLOL TAB 50 MG (LOPRESSOR)                

     MG                  

2018                                                 

      

BID&0800,                  

 

                                METOPROLOL TAB 50 MG (LOPRESSOR)                

     MG                  

2018                                                 

      

ONCE&2020                  

 

                                      LACTOBACILLUS BULGARIS TAB (LACTINEX BULGA

RIS)                     

tab           2018                                   

  

           QID&0800,1200,1700,2200                  

 

                                      ACETAMINOPHEN ORAL TABLET 325mg(Tylenol)  

                   MG     

             2018                                    

          

  PRN EVERY 4 Hour                  

 

                                      ACETAMINOPHEN SUPPOS  MG (TYLENOL) 

                    MG    

             2018                                    

         

   PRN Q6H                  

 

                                VERAPAMIL TAB 80 MG (CALAN)                     

MG                  

2018                                                 

      

Q8H&0600,1400,2200                  

 

                                METOPROLOL TAB 50 MG (LOPRESSOR)                

     MG                  

2018                                                 

      

BID&0800,                  

 

                                      Normal SALINE 0.9 % (NS 100cc) (plain bag)

                     ml   

             2018                                    

        

    BID&0800,                  

 

                                METOPROLOL TAB 50 MG (LOPRESSOR)                

     MG                  

2018                                                 

      

ONCE&0800,                  

 

                                DOXAZOSIN TAB 2 MG (CARDURA)                    

 MG                  

2018                                                 

      

Daily&0900                  

 

                                                    ASPIRIN ENTERIC COATED TAB 8

1 MG (BABY ASPIRIN EC)                  

             MG           2018                       

            

                                                    Daily&0900                  

 

                                DIGOXIN TAB 0.25 MG (LANOXIN)                   

  MG                  

2018                                                 

      

Daily&0900                  

 

                                DIGOXIN TAB 0.125 MG (LANOXIN)                  

   MG                  

2018                                                 

      

Daily&0900                  

 

                                      PANTOPRAZOLE VIAL INJ 40 MG (PROTONIX IV) 

                    MG    

             2018                                    

         

   Daily&0900                  

 

                                IBUPROFEN  MG (MOTRIN)                   

  MG                  

2018           08/15/2018                                                 

      

PRN Q6H                  

 

                                                    DILTIAZEM BOLUS VIAL INJ 25 

MG/5CC (CARDIZEM BOLUS VIAL)            

             MG           2018                       

      

                                                    ONCE&1515                  

 

                                      Normal SALINE 0.9 % (NS 100cc) (plain bag)

                     ml   

             2018           08/15/2018                                    

        

    CONTINUOUSEVERY 0 Hour                  

 

                                                    NORMAL SALINE 250CC IV BAG I

NJ 0.9 % (NS 250CC IV BAG)              

             ml           08/10/2018           2018                       

        

                                                    Q12H&1000,2200              

    

 

                                      OXYCODONE 5MG/APAP 325MG TAB(PERCOCET-5)  

                   TAB    

             08/10/2018           2018                                    

         

   PRN Q6H                  

 

                                      ACETAMINOPHEN ORAL TABLET 325mg(Tylenol)  

                   MG     

             08/10/2018           2018                                    

          

  PRN EVERY 4 Hour                  

 

                                IBUPROFEN  MG (MOTRIN)                   

  MG                  

08/10/2018           2018                                                 

      

PRN Q4H                  

 

                                VERAPAMIL TAB 80 MG (CALAN)                     

MG                  

08/10/2018           2018                                                 

      

Q8H&0600,1400,2200                  

 

                                      LACTOBACILLUS BULGARIS TAB (LACTINEX BULGA

RIS)                     

tab           08/10/2018           2018                                   

  

           QID&0800,1200,1700,2200                  

 

                                      ALBUTEROL INHALER MDI 8 GM (VENTOLIN HFA) 

                    

PUFF(S)           08/10/2018           2018                               

    

                                                    PRN QID                  

 

                                CLOPIDOGREL TAB 75 MG (PLAVIX)                  

   MG                  

08/10/2018           2018                                                 

      

Daily&                  

 

                                      Docusate sodium 100mg oral capsule (COLACE

)                     MG  

             08/10/2018           2018                                    

       

     BID&0800,2000                  

 

                                METOPROLOL TAB 50 MG (LOPRESSOR)                

     MG                  

08/10/2018           2018                                                 

      

BID&0800,2000                  

 

                                      VANCOMYCIN VIAL  MG (VANCOCIN VIAL)

                     MG   

             08/10/2018           2018                                    

        

    Q12H&1000,2200                  

 

                                DOXAZOSIN TAB 2 MG (CARDURA)                    

 MG                  

2018                                                 

      

Daily&0900                  

 

                                                    ASPIRIN ENTERIC COATED TAB 8

1 MG (BABY ASPIRIN EC)                  

             MG           2018                       

            

                                                    Daily&0900                  

 

                                PANTOPRAZOLE TAB 40 MG (PROTONIX)               

      MG                  

2018                                                 

      

Daily&0900                  

 

                                DIGOXIN TAB 0.25 MG (LANOXIN)                   

  MG                  

2018                                                 

      

Daily&0900                  

 

                                                    NORMAL SALINE 250CC IV BAG I

NJ 0.9 % (NS 250CC IV BAG)              

             ml           2018                       

        

                                                    BID&1030,2230               

   

 

                                METOPROLOL TAB 50 MG (LOPRESSOR)                

     MG                  

08/15/2018           2018                                                 

      

BID&0800,2000                  

 

                                CLOPIDOGREL TAB 75 MG (PLAVIX)                  

   MG                  

2018                                                 

      

Daily&0900                  

 

                                DIGOXIN TAB 0.125 MG (LANOXIN)                  

   MG                  

2018                                                 

      

Daily&0900                  

 

                                VERAPAMIL TAB 80 MG (CALAN)                     

Dose(s)             

2018                                                 

      

Q8H&0600,1400,2200                  

 

                                                    NORMAL SALINE 1000CC IV BAG 

INJ 0.9 % (NS 1000CC IV BAG)            

             ml           2019                       

      

                                                    ONCE&1445                  

 

                                      ONDANSETRON VIAL INJ 4 MG/2CC (ZOFRAN 2CC 

VIAL)                     

MG           2019                                    

  

          PRN ONCE                  

 

                                                    THIAMINE VIAL 2CC  MG

/CC (VIT B1 2CC VIAL)                   

           MG           2019                         

          

           ONCE&1730                  

 

                                                    CEFTRIAXONE PREMIX IV BAG IV

 1 GM/50CC (ROCEPHIN PREMIX IV BAG)     

                GM              2019                 

    

                                                                ONCE&1730       

           

 

                                      ACETAMINOPHEN ORAL TABLET 325mg(Tylenol)  

                   MG     

             2019                                    

          

  PRN EVERY 6 Hour                  

 

                                                    MULTIPLE VITAMIN INFUSION IN

J (MVI ADULT 2 VIAL KIT)                

             VIALS           2019                    

          

                                                    ONCE&1738                  

 

                                ALPRAZOLAM TAB 0.25 MG (XANAX)                  

   MG                  

2019                                                 

      

PRN Q6H                  

 

                                      HALOPERIDOL VIAL INJ 5 MG/CC (HALDOL 1CC V

IAL)                     

MG           2019                                    

  

          PRN Q4H                  

 

                                CLONIDINE TAB 0.1 MG (CATAPRES)                 

    MG                  

2019                                                 

      

PRN Q6H                  

 

                                      ACETAMINOPHEN SUPPOS  MG (TYLENOL) 

                    MG    

             2019                                    

         

   PRN Q4H                  

 

                                      ONDANSETRON VIAL INJ 4 MG/2CC (ZOFRAN 2CC 

VIAL)                     

MG           2019                                    

  

          PRN Q4H                  

 

                                      CALCIUM CARBONATE  MG (TUMS)       

              MG          

             2019                                    

             

PRN Q6H                  

 

                                      DIPHENHYDRAMINE CAP 25 MG (BENADRYL)      

               MG         

             2019                                    

             

PRN Q6H                  

 

                                      PROMETHAZINE VIAL INJ 25 MG/CC (PHENERGAN 

VIAL)                     

MG           2019                                    

  

          PRN Q6H                  

 

                                                    HYDROCODONE/APAP 5MG/325MG T

AB 5 MG/325MG (LANDON-TAB 5/325)           

             TAB           2019                      

     

                                                    PRN Q6H                  

 

                                      ALUM/MAG/SIMETH 30CC LIQ (MYLANTA PLUS)   

                  cc      

             2019                                    

           

 PRN Q4H                  

 

                                LORAZEPAM TAB 1 MG (ATIVAN)                     

MG                  

2019                                                 

      

PRN ONCE                  

 

                                      Metoprolol IV soln 5mg/5cc vial (Lopressor

)                     MG  

             2019                                    

       

     ONCE&1923                  

 

                                      Docusate sodium 100mg oral capsule (COLACE

)                         

             2019                                    

        

    PRN BID                  

 

                                METOPROLOL TAB 50 MG (LOPRESSOR)                

     MG                  

2019                                                 

      

BID&0800,2000                  

 

                                VERAPAMIL TAB 80 MG (CALAN)                     

MG                  

2019                                                 

      

BID&0800,2000                  

 

                                                    LACTULOSE SYRUP LIQ 20 GM/30

CC (CHRONULAC SYRUP)                    

           GM           2019                         

           

          BID&0800,2000                  

 

                                MELATONIN TAB 3 MG (MELATONIN)                  

   MG                  

2019                                                 

      

QHS&2100                  

 

                                DOXAZOSIN TAB 8 MG (CARDURA)                    

 MG                  

2019                                                 

      

Daily&0900                  

 

                                BISACODYL TAB 5 MG (DULCOLAX)                   

  MG                  

2019                                                 

      

PRN Daily                  

 

                                DULOXETINE CAP 30 MG (CYMBALTA)                 

    MG                  

2019                                                 

      

Daily&0900                  

 

                                PANTOPRAZOLE TAB 40 MG (PROTONIX)               

      MG                  

2019                                                 

      

Daily&0900                  

 

                                                    POLYETHYLENE GLYCOL POWDER U

D PWD (MIRALAX 17GM UNIT DOSE PAKS)     

                gm              2019                 

    

                                                                Daily&0900      

            

 

                                                    MultiVits (Thera M Plus) (mu

ltivit-iron-calcium) oral tablet        

             TAB           2019                      

  

                                                    Daily&0900                  

 

                                THIAMINE  MG (VITAMIN B1)                

     MG                  

2019                                                 

      

Daily&0900                  

 

                                      BISACODYL SUPPOS 10 MG (DULCOLAX SUPPOS)  

                   MG     

             2019                                    

          

  PRN Daily                  

 

                                                    CEFTRIAXONE PREMIX IV BAG IV

 1 GM/50CC (ROCEPHIN PREMIX IV BAG)     

                GM              2019                 

    

                                                                Daily&0900      

            

 

                                MILK OF MAGNESIA LIQ                     ml     

             2019                                                      

 PRN Daily        

         

 

                                DIGOXIN TAB 0.125 MG (LANOXIN)                  

   MG                  

2019                                                 

      

Daily&0900                  

 

                                                    NORMAL SALINE 250CC IV BAG I

[place] 0.9 % (NS 250CC IV BAG)              

             ml           2019                       

        

                                                    Q12H&1100,2300              

    

 

                                DOXAZOSIN TAB 2 MG (CARDURA)                    

 MG                  

2019                                                 

      

Daily&2100                  

 

                                      SMZ/TMP DS TAB  (SEPTRA DS) (Bactrim DS)  

                   TAB    

             2019                                    

         

   BID&0800,2000                  

 

                                                    MultiVits (Thera M Plus) (mu

ltivit-iron-calcium) oral tablet        

             TAB           2019                      

  

                                                    Daily&0900                  

 

                                      OXYCODONE 5MG/APAP 325MG TAB(PERCOCET-5)  

                   Dose(s)

             2019                                    

     

       PRN Q6H                  

 

                                      Docusate sodium 100mg oral capsule (COLACE

)                         

             2019                                    

        

    BID&0800,2000                  

 

                                ACETAMINOPHEN  MG (TYLENOL)              

       MG                  

2019                                                 

      

PRN BID                  

 

                                CLOPIDOGREL TAB 75 MG (PLAVIX)                  

   MG                  

2019                                                 

      

Daily&0900                  

 

                                                    ASPIRIN ENTERIC COATED TAB 8

1 MG (BABY ASPIRIN EC)                  

             MG           2019                       

            

                                                    Daily&0900                  

 

                                CEPHALEXIN  MG (KEFLEX)                  

   MG                  

2019                                                 

      

ONCE&0150                  

 

                                CITRATE OF MAGNESIA LIQ                     ml  

                2019                                                      

 ONCE&1751     

            

 

                                      ENEMA (FLEET'S) PhosPho ADULT             

        APPLICATION       

             2019                                    

            

ONCE&1751                  

 

                                                    NORMAL SALINE 1000CC IV BAG 

INJ 0.9 % (NS 1000CC IV BAG)            

             ml           2019                       

      

                                                    ONCE&1516                  

 

                                                    THIAMINE VIAL 2CC  MG

/CC (VIT B1 2CC VIAL)                   

           MG           2019                         

          

           ONCE&1644                  

 

                                LORAZEPAM TAB 1 MG (ATIVAN)                     

MG                  

2019                                                 

      

ONCE&1652                  

 

                                      ACETAMINOPHEN ORAL TABLET 325mg(Tylenol)  

                   MG     

             2019                                    

          

  PRN EVERY 6 Hour                  

 

                                LORAZEPAM per Detox Protocol B                  

   MG                  

2019                                                 

      

PRN ONCE                  

 

                                                    MULTIPLE VITAMIN INFUSION IN

J (MVI ADULT 2 VIAL KIT)                

             VIALS           2019                    

          

                                                    ONCE&1915                  

 

                                      Docusate sodium 100mg oral capsule (COLACE

)                     MG  

             2019                                    

       

     PRN BID                  

 

                                PANTOPRAZOLE TAB 40 MG (PROTONIX)               

      MG                  

2019                                                 

      

BID&0800,2000                  

 

                                MELATONIN TAB 3 MG (MELATONIN)                  

   MG                  

2019                                                 

      

PRN QHS                  

 

                                      HALOPERIDOL VIAL INJ 5 MG/CC (HALDOL 1CC V

IAL)                     

MG           2019                                    

  

          PRN Q4H                  

 

                                      ALUM/MAG/SIMETH 30CC LIQ (MYLANTA PLUS)   

                  cc      

             2019                                    

           

 PRN Q4H                  

 

                                CLONIDINE TAB 0.1 MG (CATAPRES)                 

    MG                  

2019                                                 

      

PRN Q6H                  

 

                                      ONDANSETRON VIAL INJ 4 MG/2CC (ZOFRAN 2CC 

VIAL)                     

MG           2019                                    

  

          PRN Q6H                  

 

                                      CALCIUM CARBONATE  MG (TUMS)       

              MG          

             2019                                    

             

PRN Q6H                  

 

                                      DIPHENHYDRAMINE CAP 25 MG (BENADRYL)      

               MG         

             2019                                    

             

PRN Q6H                  

 

                                      PROMETHAZINE VIAL INJ 25 MG/CC (PHENERGAN 

VIAL)                     

MG           2019                                    

  

          PRN Q6H                  

 

                                LORAZEPAM per Detox Protocol B                  

   MG                  

2019                                                 

      

PRN EVERY 1 Hour                  

 

                                METOPROLOL TAB 50 MG (LOPRESSOR)                

     MG                  

2019                                                 

      

BID&0800,2000                  

 

                                PANTOPRAZOLE TAB 40 MG (PROTONIX)               

      MG                  

2019                                                 

      

BID&0800,2000                  

 

                                                    POLYETHYLENE GLYCOL POWDER U

D PWD (MIRALAX 17GM UNIT DOSE PAKS)     

                gm              2019                 

    

                                                                Daily&0900      

            

 

                                VERAPAMIL TAB 80 MG (CALAN)                     

MG                  

2019                                                 

      

Daily&0900                  

 

                                THIAMINE  MG (VITAMIN B1)                

     MG                  

2019                                                 

      

Daily&0900                  

 

                                      BISACODYL SUPPOS 10 MG (DULCOLAX SUPPOS)  

                   MG     

             2019                                    

          

  PRN Daily                  

 

                                      Normal SALINE 0.9 % (NS 100cc) (plain bag)

                     ml   

             2019                                    

        

    CONTINUOUSEVERY 0 Hour                  

 

                                      GABAPENTIN  MG (NEURONTIN)         

            Dose(s)       

             2019                                    

            

ONCE&1128                  

 

                                                    NORMAL SALINE 1000CC IV BAG 

INJ 0.9 % (NS 1000CC IV BAG)            

             ml           2019                       

      

                                                    CONTINUOUSEVERY 0 Hour      

            

 

                                LORAZEPAM per Detox Protocol B                  

   MG                  

2019                                                 

      

Q6H&0600,1200,1800,2359                  

 

                                                    MULTIPLE VITAMIN INFUSION IN

J (MVI ADULT 2 VIAL KIT)                

             VIALS           2019                    

          

                                                    QPM&2000                  

 

                                      ENOXAPARIN SYRINGE INJ 40 MG (LOVENOX SYRI

NGE)                     

MG           2019                                    

  

          QHS&2100                  

 

                                CLOPIDOGREL TAB 75 MG (PLAVIX)                  

   MG                  

2019                                                 

      

Daily&0900                  

 

                                      LACTOBACILLUS BULGARIS TAB (LACTINEX BULGA

RIS)                      

             2019                                    

     

       QID&0800,1200,1700,2200                  

 

                                APIXABAN TAB 5 MG (ELIQUIS)                     

MG                  

2019                                                 

      

BID&0800,2000                  

 

                                AMLODIPINE TAB 5 MG (NORVASC)                   

  MG                  

2019                                                 

      

ONCE&1006                  

 

                                      ACETAMINOPHEN ORAL TABLET 325mg(Tylenol)  

                   MG     

             2019                                    

          

  PRN EVERY 6 Hour                  

 

                                      ALUM/MAG/SIMETH 30CC LIQ (MYLANTA PLUS)   

                  cc      

             2019           01/10/2020                                    

           

 PRN Q4H                  

 

                                                    POLYETHYLENE GLYCOL POWDER U

D PWD (MIRALAX 17GM UNIT DOSE PAKS)     

                gm              2019           01/10/2020                 

    

                                                                PRN Q3H         

         

 

                                      CALMOSEPTINE OINT TUBE (RISAMINE OINT)    

                 zaira      

             2019                                    

           

 PRN QID                  

 

                                LOPERAMIDE CAP 2 MG (IMMODIUM)                  

   MG                  

2019                                                 

      

PRN QID                  

 

                                METOPROLOL TAB 50 MG (LOPRESSOR)                

     MG                  

2019                                                 

      

BID&0800,2000                  

 

                                GABAPENTIN  MG (NEURONTIN)               

      MG                  

2019                                                 

      

QPM&2000                  

 

                                                    LACTULOSE SYRUP LIQ 20 GM/30

CC (CHRONULAC SYRUP)                    

           GM           2019                         

           

          BID&0800,2000                  

 

                                APIXABAN TAB 5 MG (ELIQUIS)                     

MG                  

2019                                                 

      

BID&0800,2000                  

 

                                CLOPIDOGREL TAB 75 MG (PLAVIX)                  

   MG                  

2020                                                 

      

QAM&0800                  

 

                                VERAPAMIL TAB 80 MG (CALAN)                     

MG                  

2020                                                 

      

QAM&0800                  

 

                                DIGOXIN TAB 0.125 MG (LANOXIN)                  

   MCG                 

2020                                                 

      

QAM&0800                  

 

                                      BISACODYL SUPPOS 10 MG (DULCOLAX SUPPOS)  

                   MG     

             2020                                    

          

  PRN Daily                  

 

                                TAMSULOSIN CAP 0.4 MG (FLOMAX)                  

   MG                  

2020                                                 

      

QPM&1800                  

 

                                      VENLAFAXINE XR CAP 75 MG (EFFEXOR XR)     

                MG        

             2020                                    

             

Daily&0900                  

 

                                FOLIC ACID TAB 1 MG                     MG      

            2020                                                      

 Daily&0900        

         

 

                                                    Multivit-iron-min-folic acid

) tab,CHEWable (Centrum)                

             TAB           2020                      

          

                                                    Daily&0900                  

 

                                MIRTAZAPINE TAB 15 MG (REMERON)                 

    MG                  

2020                                                 

      

QHS&2100                  

 

                                AMLODIPINE TAB 5 MG (NORVASC)                   

  MG                  

2020                                                 

      

Daily&0900                  

 

                                      VENLAFAXINE XR CAP 75 MG (EFFEXOR XR)     

                MG        

             2020                                    

             

Daily&0900                  

 

                                FINASTERIDE TAB 5 MG (PROSCAR)                  

   MG                  

2020                                                 

      

ONCE&1021                  

 

                                                    Flu vacc [id number] 6mos up(P

F)) IM syringe QUAD (Fluarix)           

             ML           2020                       

     

                                                    ONCE&205                  

 

                                PNEUMONIA VACCINE INJ (PREVNAR-13)              

       ml                  

2020                                                 

      

ONCE&                  

 

                                MELATONIN TAB 3 MG (MELATONIN)                  

   MG                  

2020                                                 

      

QHS&2100                  

 

                                FINASTERIDE TAB 5 MG (PROSCAR)                  

   MG                  

2020                                                 

      

Daily&0900                  

 

                                APIXABAN TAB 5 MG (ELIQUIS)                     

MG                  

2020                                                 

      

BID&0800,2000                  

 

                                      Metoprolol IV soln 5mg/5cc vial (Lopressor

)                     MG  

             2020                                    

       

     ONCE&1047                  

 

                                                    NORMAL SALINE 1000CC IV BAG 

INJ 0.9 % (NS 1000CC IV BAG)            

             ml           2020                       

      

                                                    ONCE&1047                  

 

                                METOPROLOL TAB 25 MG (LOPRESSOR)                

     MG                  

2020                                                 

      

ONCE&1113                  

 

                                                    IPRATROPIUM/ALBUTEROL INH SO

LN (DUO-NEB INH SOLN)                   

             MLS           2020                      

             

                                                    ONCE&1113                  

 

                                DIGOXIN  MCG (LANOXIN)                   

  MCG                 

2020                                                 

      

ONCE&1113                  



                                                                                
                                                                                
                                                                                
                                                                                
                                                      



Problems

      



             Date Dx Coded           Attending           Type           Code    

       

Diagnosis                               Diagnosed By        

 

             2018           Alesha Paris            285.1    

       ACUTE 

POSTHEMORRHAGIC ANEMIA                           

 

             2018           Alesha Paris            338.2    

       CHRONIC

PAIN                                             

 

             2018           Alesha Paris            401.0    

              

                                                 

 

             2018           Alesha Paris            427.32   

              

                                                 

 

             2018           Alesha Paris            585.9    

       CHRONIC

KIDNEY DISEASE, UNSPECIFIED                      

 

             2018           Alesha Paris            719.45   

        PAIN 

IN JOINT INVOLVING PELVIC REGION AND THIGH                    

 

             2018           Alesha Paris           A            780.79   

              

                                                 

 

             2018           Alesha Paris            D62      

     ACUTE 

POSTHEMORRHAGIC ANEMIA                           

 

             2018           Alesha Paris            G89.29   

        OTHER 

CHRONIC PAIN                                     

 

             2018           Alesha Paris            I10      

     ESSENTIAL

(PRIMARY) HYPERTENSION                           

 

             2018           Alesha Paris            I48.2    

       CHRONIC

ATRIAL FIBRILLATION                              

 

             2018           Alesha Paris            M25.552  

         PAIN 

IN LEFT HIP                                      

 

             2018           Paris, Alesha           W            N18.9    

       CHRONIC

KIDNEY DISEASE, UNSPECIFIED                      

 

             2018           Alesha Paris           A            R53.1    

       

WEAKNESS                                         

 

             2018           Alesha Paris           W            R53.81   

        OTHER 

MALAISE                                          

 

             2018           Alesha Paris           W            V12.55   

        

PERSONAL HISTORY OF PULMONARY EMBOLISM                    

 

             2018           Alesha Paris           W            V45.4    

              

                                                 

 

             2018           Alesha Paris           W            Z86.711  

         

PERSONAL HISTORY OF PULMONARY EMBOLISM                    

 

             2018           Alesha aPris           W            Z98.1    

       

ARTHRODESIS STATUS                               

 

             2018           Alesha Paris           W            486      

     

PNEUMONIA, ORGANISM UNSPECIFIED                    

 

             2018           Alesha Paris           W            I48.9    

       

UNSPECIFIED ATRIAL FIBRILLATION AND ATRIAL FLUTTER                    

 

             2018           Alesha Paris           W            J18.9    

       

PNEUMONIA, UNSPECIFIED ORGANISM                    

 

             2018           Alesha Paris           W            486      

     

PNEUMONIA, ORGANISM UNSPECIFIED                    

 

             2018           Alesha Paris           W            I48.9    

       

UNSPECIFIED ATRIAL FIBRILLATION AND ATRIAL FLUTTER                    

 

             2018           Alesha Paris           W            J18.9    

       

PNEUMONIA, UNSPECIFIED ORGANISM                    

 

             08/10/2018           Mendez Parisi           W            041.12   

              

                                                 

 

             08/10/2018           Wellington Alesha           W            276.51   

              

                                                 

 

             08/10/2018           Wellington Alesha           W            303.90   

              

                                                 

 

             08/10/2018           Wellington Alesha           W            338.2    

       CHRONIC

PAIN                                             

 

             08/10/2018           Wellington Alesha           W            401.0    

              

                                                 

 

             08/10/2018           Wellington Alesha           W            427.31   

              

                                                 

 

             08/10/2018           Wellington Alesha           W            486      

     

PNEUMONIA, ORGANISM UNSPECIFIED                    

 

             08/10/2018           Wellington Alesha           W            600.20   

        BENIGN

LOCALIZED HYPERPLASIA OF PROSTATE WITHOUT URINARY OBSTRUCTION AND OTHER LOWER 
URINARY TRACT SYMPTOMS (LUTS)                    

 

             08/10/2018           Alesha Paris           A            771.83   

              

                                                 

 

             08/10/2018           Wellington Alesha           W            780.60   

              

                                                 

 

             08/10/2018           Wellington Alesha           W            780.79   

        OTHER 

MALAISE AND FATIGUE                              

 

             08/10/2018           Mendez Parisi           W            B95.62   

              

                                                 

 

             08/10/2018           Wellington Alesha           W            E86.0    

       

DEHYDRATION                                      

 

             08/10/2018           [place] Alesha           W            F10.20   

        

ALCOHOL DEPENDENCE, UNCOMPLICATED                    

 

             08/10/2018           Mendez Parisi           W            G89.29   

        OTHER 

CHRONIC PAIN                                     

 

             08/10/2018           Paris, Alesha           W            I10      

     ESSENTIAL

(PRIMARY) HYPERTENSION                           

 

             08/10/2018           Alesha Paris           W            I48.9    

       

UNSPECIFIED ATRIAL FIBRILLATION AND ATRIAL FLUTTER                    

 

             08/10/2018           Alesha Paris           W            I48.91   

        

UNSPECIFIED ATRIAL FIBRILLATION                    

 

             08/10/2018           Alesha Paris           W            J18.9    

       

PNEUMONIA, UNSPECIFIED ORGANISM                    

 

             08/10/2018           Alesha Paris           W            N40.0    

       BENIGN 

PROSTATIC HYPERPLASIA WITHOUT LOWER URINRY TRACT SYMP                    

 

             08/10/2018           Alesha Paris           W            R50.9    

       FEVER, 

UNSPECIFIED                                      

 

             08/10/2018           Aelsha Paris           W            R53.81   

        OTHER 

MALAISE                                          

 

             08/10/2018           Alesha Paris           A            R78.81   

              

                                                 

 

             2018           Alesha Paris           W            041.12   

              

                                                 

 

             2018           Alesha Paris           W            303.90   

        OTHER 

AND UNSPECIFIED ALCOHOL DEPENDENCE, UNSPECIFIED DRINKING BEHAVIOR               

    

 

             2018           Alesha Paris           W            401.0    

       

MALIGNANT ESSENTIAL HYPERTENSION                    

 

             2018           Alesha Paris           W            427.31   

        ATRIAL

FIBRILLATION                                     

 

             2018           Alesha Paris           W            486      

              

                                                 

 

             2018           Alesha Paris           W            530.81   

        

ESOPHAGEAL REFLUX                                

 

             2018           Mendez Parisi           A            771.83   

              

                                                 

 

             2018           Wellington Alesha           W            780.79   

        OTHER 

MALAISE AND FATIGUE                              

 

             2018           Alesha Paris           W            B95.62   

        

METHICILLIN RESIS STAPH INFCT CAUSING DISEASES CLASSD ELSWHR                    

 

             2018           Alesha Paris           W            F10.20   

        

ALCOHOL DEPENDENCE, UNCOMPLICATED                                               

 

             2018           Alesha Paris           W            I10      

     ESSENTIAL

(PRIMARY) HYPERTENSION                                                

 

             2018           Alesha Paris           W            I48.91   

        

UNSPECIFIED ATRIAL FIBRILLATION                                                 

 

             2018           Alesha Paris           W            J18.9    

       

PNEUMONIA, UNSPECIFIED ORGANISM                                                 

 

             2018           Alesha Paris           W            K21.9    

       GASTRO-

ESOPHAGEAL REFLUX DISEASE WITHOUT ESOPHAGITIS                            

 

             2018           Alesha Prais           W            R53.81   

        OTHER 

MALAISE                                                                   

 

             2018           Wellington Alesha           A            R78.81   

        

BACTEREMIA                                       

 

             2019           Alesha Paris           W            599.0    

       URINARY

TRACT INFECTION, SITE NOT SPECIFIED                    

 

             2019           Alesha Paris           W            N39.0    

       URINARY

TRACT INFECTION, SITE NOT SPECIFIED                    

 

             2019           Alesha Paris           W            427.31   

        ATRIAL

FIBRILLATION                                     

 

             2019           Alesha Paris           W            599.0    

       URINARY

TRACT INFECTION, SITE NOT SPECIFIED                    

 

             2019           Alesha Paris           W            A41      

     OTHER 

SEPSIS                                           

 

             2019           Alesha Paris           W            F10.23   

        

ALCOHOL DEPENDENCE WITH WITHDRAWAL                    

 

             2019           Alesha Paris           W            I48.2    

       CHRONIC

ATRIAL FIBRILLATION                              

 

             2019           Alesha Paris           W            N39.0    

       URINARY

TRACT INFECTION, SITE NOT SPECIFIED                    

 

             2019           Alesha Paris           W            038.9    

       

UNSPECIFIED SEPTICEMIA                           

 

             2019           Alesha Paris           W            276.51   

              

                                                 

 

             2019           Mendez Parisi           W            287.5    

              

                                                 

 

             2019           Alesha Paris           W            291.81   

              

                                                 

 

             2019           Alesha Paris           W            427.31   

        ATRIAL

FIBRILLATION                                     

 

             2019           Alesha Paris           W            599.0    

       URINARY

TRACT INFECTION, SITE NOT SPECIFIED                    

 

             2019           Alesha Paris           W            599.71   

              

                                                 

 

             2019           Alesha Paris           W            600.20   

              

                                                 

 

             2019           Mendez Parisi           W            781.99   

              

                                                 

 

             2019           Alesha Paris           W            797      

              

                                                 

 

             2019           Alesha Paris           W            A41.9    

       SEPSIS,

UNSPECIFIED ORGANISM                             

 

             2019           Alesha Paris           W            D69.6    

              

                                                 

 

             2019           Alesha Paris           W            E86.0    

       

DEHYDRATION                                                                     

 

             2019           Alesha Paris           W            F10.23   

        

ALCOHOL DEPENDENCE WITH WITHDRAWAL                    

 

             2019           Alesha Paris           W            F10.230  

         

ALCOHOL DEPENDENCE WITH WITHDRAWAL, UNCOMPLICATED                               

 

             2019           Alesha Paris           W            I48.2    

       CHRONIC

ATRIAL FIBRILLATION                              

 

             2019           Alesha Paris           W            N39.0    

       URINARY

TRACT INFECTION, SITE NOT SPECIFIED                    

 

             2019           Alesha Paris           W            N40.0    

              

                                                 

 

             2019           Alesha Paris           W            R29.6    

       

REPEATED FALLS                                                                  

 

             2019           Alesha Paris           W            R31.0    

       GROSS 

HEMATURIA                                                                 

 

             2019           Alesha Paris           W            R54      

     AGE-

RELATED PHYSICAL DEBILITY                                                   

 

             2019           Alesha Paris           W            V58.69   

        LONG-

TERM (CURRENT) USE OF OTHER MEDICATIONS                    

 

             2019           Alesha Paris           W            Z79.899  

         OTHER

LONG TERM (CURRENT) DRUG THERAPY                                          

 

             2019           Blair, Tavia           W            303.90 

          

OTHER AND UNSPECIFIED ALCOHOL DEPENDENCE, UNSPECIFIED DRINKING BEHAVIOR         
                                                 

 

             2019           BlairTavia           W            401.0  

              

                                                 

 

             2019           BlairTavia           W            427.32 

          

ATRIAL FLUTTER                                   

 

             2019           BlairTavia           W            724.2  

         

LUMBAGO                                          

 

             2019           BlairTavia           W            780.79 

              

                                                 

 

             2019           BlairTavia           W            781.2  

              

                                                 

 

             2019           BlairTavia           W            F10.20 

          

ALCOHOL DEPENDENCE, UNCOMPLICATED                                               

 

             2019           BlairTavia           W            I10    

       

ESSENTIAL (PRIMARY) HYPERTENSION                                                

 

             2019           Blair, Tavia           W            I48.2  

         

CHRONIC ATRIAL FIBRILLATION                                                     

 

             2019           BlairTavia           W            M54.5  

         LOW 

BACK PAIN                                                                   

 

             2019           BlairTavia           W            R26.89 

          

OTHER ABNORMALITIES OF GAIT AND MOBILITY                                        

 

             2019           BlairTavia           W            R53.1  

         

WEAKNESS                                         

 

             2019           BlairTavia           W            V15.88 

          

PERSONAL HISTORY OF FALL                         

 

             2019           Tavia Blair           W            V45.4  

         

POSTSURGICAL ARTHRODESIS STATUS                    

 

             2019           BlairTavia           W            Z91.81 

          

HISTORY OF FALLING                                                              

 

             2019           Tavia Blair           W            Z98.1  

         

ARTHRODESIS STATUS                                                              

 

             2019           DONATO BOGGS           W            784.7

           

EPISTAXIS                                        

 

             2019           DONATO BOGGS           W            R04.0

           

EPISTAXIS                                                                       

 

             05/15/2019           Arnoldo Beltran            V58.30  

         

ENCOUNTER FOR CHANGE OR REMOVAL OF NONSURGICAL WOUND DRESSING                   



 

             05/15/2019           Arnoldo Beltran           W            Z48.00  

         

ENCOUNTER FOR CHANGE OR REMOVAL OF NONSURG WOUND DRESSING                       

 

             2019           LEISURE, LYNIETA           W            784.7 

          

EPISTAXIS                                        

 

             2019           LEISURE, LYNIETA           W            R04.0 

          

EPISTAXIS                                        

 

             2019           LEISURE, LYNIETA           W            784.7 

          

EPISTAXIS                                        

 

             2019           LEISURE, LYNIETA           W            R04.0 

          

EPISTAXIS                                        

 

             2019           LEISURE, LYNIETA           W            784.7 

          

EPISTAXIS                                        

 

             2019           LEISURE, LYNIETA           W            R04.0 

          

EPISTAXIS                                        

 

             2019           Arnoldo Beltran            564.00  

         

CONSTIPATION, UNSPECIFIED                        

 

             2019           Arnoldo Beltran            B95.62  

         

METHICILLIN RESIS STAPH INFCT CAUSING DISEASES CLASSD ELSWHR                    

 

             2019           Arnoldo Beltran            D62     

      ACUTE 

POSTHEMORRHAGIC ANEMIA                           

 

             2019           Arnoldo Beltran            D69.6   

        

THROMBOCYTOPENIA, UNSPECIFIED                    

 

             2019           Arnoldo Beltran            E86.0   

        

DEHYDRATION                                      

 

             2019           Arnoldo Beltran            F10.20  

         

ALCOHOL DEPENDENCE, UNCOM                        

 

             2019           Arnoldo Beltran            F10.230 

          

ALCOHOL DEPENDENCE WITH WITHDRAWAL, UNCOMPLICATED                    

 

             2019           Arnoldo Beltran            G89.29  

         OTHER

CHRONIC PAIN                                     

 

             2019           Arnoldo Beltran            I10     

      

ESSENTIAL (PRIMARY) HYPERTENSION                    

 

             2019           Arnoldo Beltran            I48.2   

        

CHRONIC ATRIAL FIBRILLATION                      

 

             2019           Arnoldo Beltran            I48.91  

         

UNSPECIFIED ATRIAL FIBRILLATION                    

 

             2019           Arnoldo Beltran            J18.9   

        

PNEUMONIA, UNSPECIFIED ORGANISM                    

 

             2019           Arnoldo Beltran            K59.00  

         

CONSTIPATION, UNSPECIFIED                                                       

 

             2019           Arnoldo Beltran            M54.5   

        LOW 

BACK PAIN                                        

 

             2019           Arnoldo Beltran            N39.0   

        

URINARY TRACT INFECTION, SITE NOT SPECIFIED                    

 

             2019           Arnoldo Beltran            N40.0   

        BENIGN

PROSTATIC HYPERPLASIA WITHOUT LOWER URINRY TRACT SYMP                    

 

             2019           Arnoldo Beltran            R04.0   

        

EPISTAXIS                                        

 

             2019           Arnoldo Beltran            R29.6   

        

REPEATED FALLS                                   

 

             2019           Arnoldo Beltran            R31.0   

        GROSS 

HEMATURIA                                        

 

             2019           Arnoldo Beltran            R50.9   

        FEVER,

UNSPECIFIED                                      

 

             2019           Arnoldo Beltran            R53.1   

        

WEAKNESS                                         

 

             2019           Arnoldo Beltran            R53.81  

         OTHER

MALAISE                                          

 

             2019           Arnoldo Beltran            Z86.711 

          

PERSONAL HISTORY OF PULMONARY EMBOLISM                    

 

             2019           Arnoldo Beltran            Z91.81  

         

HISTORY OF FALLING                               

 

             2019           Arnoldo Beltran            Z98.1   

        

ARTHRODESIS STATUS                               

 

             2019           LUISANA REESE DO           Ot           K66

.8           

OTHER SPECIFIED DISORDERS OF PERITONEUM                    

 

             2019           LUISANA REESE DO           Ot           K66

.8           

OTHER SPECIFIED DISORDERS OF PERITONEUM                    

 

                2019           LUISANA REESE DO           Ot           

   M48.061         

                          SPINAL STENOSIS, LUMBAR REGION WITHOUT N              

      

 

             2019           LUISANA REESE DO           Ot           Z98

.1           

ARTHRODESIS STATUS                               

 

             10/10/2019           LUISANA REESE DO           Ot           K66

.8           

OTHER SPECIFIED DISORDERS OF PERITONEUM                    

 

             2019           ARTHUR APRJEREMIAS STORMY           W            B95

.62           

METHICILLIN RESIS STAPH INFCT CAUSING DISEASES CLASSD ELSWHR                    

 

             2019           ARTHUR APRJEREMIAS STORMY           W            D62

           

ACUTE POSTHEMORRHAGIC ANEMIA                     

 

             2019           ARTHUR APRJEREMIAS STORMY           W            D69

.6           

THROMBOCYTOPENIA, UNSPECIFIED                    

 

             2019           ARTHUR APRJEREMIAS STORMY           W            E86

.0           

DEHYDRATION                                      

 

             2019           ARTHUR TREVOR STORMY           W            F10

.20           

ALCOHOL DEPENDENCE, UNCOMPLICATED                    

 

                2019           ARTHUR APRJEREMIAS STORMY           W            

   F10.230          

                          ALCOHOL DEPENDENCE WITH WITHDRAWAL, UNCOMPLICATED     

               

 

             2019           ARTHUR APRJEREMIAS STORMY           W            G89

.29           

OTHER CHRONIC PAIN                               

 

             2019           ARTHUR APRJEREMIAS STORMY           W            I10

           

ESSENTIAL (PRIMARY) HYPERTENSION                    

 

             2019           ARTHUR APRJEREMIAS STORMY           W            I48

.2           

CHRONIC ATRIAL FIBRILLATION                      

 

             2019           ARTHUR APRJEREMIAS STORMY           W            I48

.91           

UNSPECIFIED ATRIAL FIBRILLATION                    

 

             2019           ARTHUR APRJEREMIAS STORMY           W            J18

.9           

PNEUMONIA, UNSPECIFIED ORGANISM                    

 

             2019           ARTHUR APRJEREMIAS STORMY           W            M54

.5           

LOW BACK PAIN                                    

 

             2019           ARTHUR APRJEREMIAS STORMY           W            N39

.0           

URINARY TRACT INFECTION, SITE NOT SPECIFIED                    

 

             2019           ARTHUR APRJEREMIAS STORMY           W            N40

.0           

BENIGN PROSTATIC HYPERPLASIA WITHOUT LOWER URINRY TRACT SYMP                    

 

             2019           ARTHUR APRJEREMIAS STORMY           W            R04

.0           

EPISTAXIS                                        

 

             2019           ARTHUR APRJEREMIAS STORMY           W            R29

.6           

REPEATED FALLS                                   

 

             2019           ARTHUR APRJEREMIAS STORMY           W            R31

.0           

GROSS HEMATURIA                                  

 

             2019           ARTHUR APRJEREMIAS STORMY           W            R50

.9           

FEVER, UNSPECIFIED                               

 

             2019           ARTHUR APRJEREMIAS STORMY           W            R53

.1           

WEAKNESS                                         

 

             2019           ARTHUR APRJEREMIAS STORMY           W            R53

.81           

OTHER MALAISE                                    

 

                2019           ARTHURDAJUAN MONROEJULIUS ROPER            

   Z86.711          

                          PERSONAL HISTORY OF PULMONARY EMBOLISM                

    

 

             2019           ARTHURDAJUAN MONROEJULIUS           W            Z91

.81           

HISTORY OF FALLING                               

 

             2019           ARTHUR APRJEREMIAS CHOLO           W            Z98

.1           

ARTHRODESIS STATUS                               

 

             2019           Alesha Paris           W            G93.4    

       OTHER 

AND UNSPECIFIED ENCEPHALOPATHY                    

 

             2019           Alesha Paris           W            B95.62   

        

METHICILLIN RESIS STAPH INFCT CAUSING DISEASES CLASSD ELSWHR                    

 

             2019           Alesha Paris           W            D62      

     ACUTE 

POSTHEMORRHAGIC ANEMIA                           

 

             2019           Alesha Paris           W            D69.6    

       

THROMBOCYTOPENIA, UNSPECIFIED                    

 

             2019           Alesha Paris           W            E86.0    

       

DEHYDRATION                                      

 

             2019           Alesha Paris           W            F10.20   

        

ALCOHOL DEPENDENCE, UN                           

 

             2019           Alesha Paris           W            F10.230  

         

ALCOHOL DEPENDENCE WITH WITHDRAWAL, UNCOMPLICATED                    

 

             2019           Alesha Paris           W            G89.29   

        OTHER 

CHRONIC PAIN                                     

 

             2019           Alesha Paris           W            G93.4    

       OTHER 

AND UNSPECIFIED ENCEPHALOPATHY                    

 

             2019           Alesha Paris           W            I10      

     ESSENTIAL

(PRIMARY) HYPERTENSION                           

 

             2019           Alesha Paris           W            I48.2    

       CHRONIC

ATRIAL FIBRILLATION                              

 

             2019           Alesha Paris           W            I48.91   

        

UNSPECIFIED ATRIAL FIBRILLATION                    

 

             2019           Alesha Paris           W            J18.9    

       

PNEUMONIA, UNSPECIFIED ORGANISM                    

 

             2019           Alesha Paris           W            N39.0    

       URINARY

TRACT INFECTION, SITE NOT SPECIFIED                    

 

             2019           Alesha Paris           W            N40.0    

       BENIGN 

PROSTATIC HYPERPLASIA WITHOUT LOWER URINRY TRACT SYMP                    

 

             2019           Alesha Paris           W            R04.0    

       

EPISTAXIS                                        

 

             2019           Alesha Paris           W            R26.89   

        OTHER 

ABNORMALITIES OF GAIT AND MOBILITY                    

 

             2019           Alesha Paris           W            R29.6    

       

REPEATED FALLS                                   

 

             2019           Alesha Paris           W            R31.0    

       GROSS 

HEMATURIA                                        

 

             2019           Alesha Paris           W            R50.9    

       FEVER, 

UNSPECIFIED                                      

 

             2019           Alesha Paris           W            R53.1    

       

WEAKNESS                                         

 

             2019           Alesha Paris           W            R53.81   

        OTHER 

MALAISE                                          

 

             2019           Alesha Paris           W            Z86.711  

         

PERSONAL HISTORY OF PULMONARY EMBOLISM                    

 

             2019           ParisMendezjohny ROPER            Z91.81   

        

HISTORY OF FALLING                               

 

             2019           [place] ROPER            Z98.1    

       

ARTHRODESIS STATUS                               

 

             2020           OBI CORRAL            291

.89           

OTHER                                            

 

             2020           OBI CORRAL            296

.34           

MAJOR DEPRESSIVE DISORDER, RECURRENT EPISODE, SEVERE DEG                    

 

             2020           OBI CORRAL            427

.31           

ATRIAL FIBRILLATION                              

 

             2020           OBI CORRAL            780

.52           

I                                                

 

             2020           OBI CORRAL            D62

           

ACUTE POSTHEMORRHAGIC ANEMIA                     

 

             2020           OBI CORRAL            D69

.6           

THROMBOCYTOPENIA, UNSPECIFIED                    

 

             2020           OBI CORRAL            E86

.0           

DEHYDRATION                                      

 

             2020           NATALIIAGLOBI FIGUEREDO            F10

.14           

ALCOHOL ABUSE WITH ALCOHOL-INDUCED MOOD DISORDER                                

 

             2020           OBI CORRAL            F10

.20           

ALCOHOL DEPENDENCE, UNCOMPLICATED                    

 

                2020           TARTAGLOBI FIGUEREDO            

   F10.230          

                          ALCOHOL DEPENDENCE WITH WITHDRAWAL, UNCOMPLICATED     

               

 

             2020           NATALIIAGLOBI FIGUEREDO            F33

.3           

MAJOR DEPRESSV DISORDER, RECURRENT, SEVERE W PSYCH SYMPTOMS                    

 

             2020           OBI CORRAL            G47

.00           

INSOMNIA, UNSPECIFIED                                                           

 

             2020           OBI CORRAL            I10

           

ESSENTIAL (PRIMARY) HYPERTENSION                    

 

             2020           OBI CORRAL            I48

.2           

CHRONIC ATRIAL FIBRILLATION                      

 

             2020           OBI CORRAL            I48

.91           

UNSPECIFIED ATRIAL FIBRILLATION                    

 

             2020           OBI CORRAL            J18

.9           

PNEUMONIA, UNSPECIFIED ORGANISM                    

 

             2020           OBI CORRAL            N40

.0           

BENIGN PROSTATIC HYPERPLASIA WITHOUT LOWER URINRY TRACT SYMP                    

 

             2020           OBI CORRAL            R04

.0           

EPISTAXIS                                        

 

             2020           OBI CORRAL                        R29

.6           

REPEATED FALLS                                   

 

             2020           OBI CORRAL            R31

.0           

GROSS HEMATURIA                                  

 

             2020           TARTAGLIONE, OBI           W            R50

.9           

FEVER, UNSPECIFIED                               

 

             2020           TARTAGLOBI FIGUEREDO            R53

.1           

WEAKNESS                                         

 

             2020           OBI CRORAL           W            R53

.81           

OTHER MALAISE                                    

 

             2020           OBI CORRAL            V58

.61           

LONG-TERM (CURRENT)                              

 

             2020           NATALIIAGLOBI FIGUEREDO            Z79

.01           

LONG TERM (CURRENT) USE OF ANTICOAGULANTS                                       

 

                2020           OBI CORRAL            

   Z86.711          

                          PERSONAL HISTORY OF PULMONARY EMBOLISM                

    

 

             2020           OBI CORRAL           W            Z91

.81           

HISTORY OF FALLING                               

 

             2020           OBI CORRAL            Z98

.1           

ARTHRODESIS STATUS                               

 

             2020                        W            B95.62           [facility] CAUSING DISEASES CLASSD ELSWHR                    

 

             2020                        W            D62           ACUTE 

POSTHEMORRHAGIC

ANEMIA                                           

 

             2020                        W            D69.6           THRO

MBOCYTOPENIA, 

UNSPECIFIED                                      

 

             2020                        W            E86.0           DEHY

DRATION        

                                                 

 

             2020                        W            F10.20           ALC

OHOL 

DEPENDENCE, UNCOMPLICATED                        

 

             2020                        W            F10.230           AL

COHOL 

DEPENDENCE WITH WITHDRAWAL, UNCOMPLICATED                    

 

             2020                        W            G89.29           OTH

ER CHRONIC PAIN

                                                 

 

             2020                        W            I10           ESSENT

IAL (PRIMARY) 

HYPERTENSION                                     

 

             2020                        W            I48.2           

LOUISE ATRIAL 

FIBRILLATION                                     

 

             2020                        W            I48.91           UNS

PECIFIED ATRIAL

FIBRILLATION                                     

 

             2020                        W            J18.9           PNEU

MONIA, 

UNSPECIFIED ORGANISM                             

 

             2020                                     N18.9           

LOUISE KIDNEY 

DISEASE, U                                       

 

             2020                        W            N39.0           URIN

MARVA TRACT 

INFECTION, SITE NOT SPECIFIED                    

 

             2020                        W            N40.0           BOBBI

GN PROSTATIC 

HYPERPLASIA WITHOUT LOWER URINRY TRACT SYMP                    

 

             2020                        W            R04.0           EPIS

TAXIS          

                                                 

 

             2020                        W            R26.89           OTH

ER 

ABNORMALITIES OF GAIT AND MOBILITY                    

 

             2020                        W            R29.6           REPE

ATED FALLS     

                                                 

 

             2020                        W            R31.0           OMID

S HEMATURIA    

                                                 

 

             2020                        W            R50.9           FEVE

R, UNSPECIFIED 

                                                 

 

           2020                       W           R53.1           WEAKNESS

           

        

 

             2020                        W            R53.81           OTH

ER MALAISE     

                                                 

 

             2020                        W            Z86.711           PE

RSONAL HISTORY 

OF PULMONARY EMBOLISM                            

 

             2020                        W            Z91.81           HIS

TORY OF FALLING

                                                 

 

             2020                        W            Z98.1           ARTH

RODESIS STATUS 

                                                 



                                                                                
                                                                                
                                                                                
                                                                                
                                                                                
                                                                                
                                



Procedures

      



There is no data.                  



Results

      



                    Test                Result              Range        

 

                                        Digoxin - 18 16:24         

 

                    Digoxin             0.3 ng/mL           0.5-1.5        

 

                                        Urinalysis - 18 18:02         

 

                    Icotest             Negative            Negative        

 

                    Urine Volume           Urine Volume Sufficient (10mL)       

              

 

                    Urine Yeast           No Yeast present                     

 

                    Urine-Appearance           Slightly Cloudy            Clear 

       

 

                    Urine-Bacteria           Trace                        

 

                    Urine-Bilirubin           1+                  Negative      

  

 

                    Urine-Blood           Negative            Negative        

 

                    Urine-Color           Yellow              Colorless-[place]

ow        

 

                    Urine-Epithelial Cells           0-5/HPF                    

  

 

                    Urine-Glucose           Negative            Negative        

 

                    Urine-Ketones           Negative            Negative        

 

                    Urine-Leukocytes           Negative            Negative     

   

 

                    Urine-Mucus           3+                           

 

                    Urine-Nitrite           Negative            Negative        

 

                          Urine-Other                Urine Saved if Culture Need

ed (48hrs from time of 

collection)                                      

 

                    Urine-pH            5.5                 5-8.5        

 

                    Urine-Protein           1+                  Negative        

 

                    Urine-RBC           0-2/HPF                      

 

                    Urine-Specific Gravity           1.025               1.000-1

.030        

 

                    Urine-WBC           Rare/HPF                     

 

                    Urobilinogen           1.0                 0.2-1.0        

 

                                        Mycoplasma - 18 18:55         

 

                    Mycoplasma           Negative            Negative        

 

                                        Blood Culture - 18 18:55         

 

                          PRELIM CULTURE RESULTS           Blood Culture POSITIV

E, Growth Day [id number] 

Positive Cocci noted on Gram Stain, Further Testing Pending                     

 

                    MEDIA PLATED           Blood Culture Media Position C46     

                

 

                    CULTURE SOURCE           right ac                     

 

                                        Sensi - 18 18:55         

 

                          FINAL CULTURE RESULTS           Methicillin Resistant 

Staphylococcus aureus 

(Isolate 1)                                      

 

                    Ampicillin/Sulbactam           <=8/4                        

 

                    Ampicillin           >8                           

 

                    Amoxicillin/K Clavulanate           >4/2                    

     

 

                    Ceftriaxone           >32                          

 

                    Clindamycin           <=0.5                        

 

                    Cefoxitin Screen           >4                           

 

                    Ciprofloxacin           <=1                          

 

                    Daptomycin           <=0.5                        

 

                    Erythromycin           >4                           

 

                    Nitrofurantoin           <=32                         

 

                    Gentamicin           <=4                          

 

                    Gentamicin Synergy Screen           N/R                     

     

 

                    Inducible Clindamycin           <=4/0.5                     

 

 

                    Levofloxacin           <=1                          

 

                    Linezolid           <=1                          

 

                    Moxifloxacin           <=0.5                        

 

                    Oxacillin           >2                           

 

                    Penicillin           >8                           

 

                    Rifampin            <=1                          

 

                    Streptomycin Synergy           N/R                          

 

                    Synercid            <=0.5                        

 

                    Trimethoprim/ Sulfamethoxazole           <=0.5/9.5          

           

 

                    Tetracycline           <=4                          

 

                    Vancomycin           1                            

 

                                        Blood Culture - 18 18:55         

 

                          PRELIM CULTURE RESULTS           Blood Culture POSITIV

E, Growth Day [id number] 

Negative Rods seen on Gram Stain, Further Testing is pending                    



 

                          FINAL CULTURE RESULTS           MRSA, same MAHSA as othe

r blood culture.          

                                                 

 

                    MEDIA PLATED           Blood Culture Media Position C50     

                

 

                    CULTURE SOURCE           left ac                      

 

                                        BMP - 18 05:41         

 

                    Anion Gap           19                  6-14        

 

                    BUN                 17 mg/dL            5-25        

 

                    Calcium             8.8 mg/dL           8.3-10.4        

 

                    Chloride            102 mmol/L                   

 

                    CO2                 21 mEq/L            22-33        

 

                    Creat               0.88 mg/dL           0.50-1.50        

 

                    eGFR                84 mL/min/1.73m2           >59        

 

                    Glucose             111 mg/dL                   

 

                    Osmo                285                 280-295        

 

                    Potassium           4.5 mmol/L           3.5-5.3        

 

                    Sodium              137 mmol/L           134-148        

 

                                        Comprehensive Metabolic Panel - 18

 05:15         

 

                    Albumin             3.4 g/dL            3.6-5.1        

 

                    ALP                 68 U/L                      

 

                    ALT                 23 U/L              6-45        

 

                    Anion Gap           16                  6-14        

 

                    AST                 24 U/L              2-40        

 

                    BUN                 16 mg/dL            5-25        

 

                    Calcium             8.5 mg/dL           8.3-10.4        

 

                    Chloride            102 mmol/L                   

 

                    CO2                 22 mEq/L            22-33        

 

                    Creat               0.86 mg/dL           0.50-1.50        

 

                    eGFR                87 mL/min/1.73m2           >59        

 

                    Globulin            2.5 g/dL            2.3-3.5        

 

                    Glucose             120 mg/dL                   

 

                    Osmo                281                 280-295        

 

                    Potassium           4.5 mmol/L           3.5-5.3        

 

                    Sodium              135 mmol/L           134-148        

 

                    TBil                1.0 mg/dL           0.2-1.2        

 

                    TP                  5.9 g/dL            6.0-8.3        

 

                                        Vancomycin Trough - 18 09:00      

   

 

                    Vanco Trough           7.5 ug/mL           10.0-20.0        

 

                                        Vancomycin Trough - 18 09:51      

   

 

                    Vanco Trough           12.5 ug/mL           10.0-20.0       

 

 

                                        Comprehensive Metabolic Panel - 18

 11:10         

 

                    Albumin             3.1 g/dL            3.6-5.1        

 

                    ALP                 83 U/L                      

 

                    ALT                 20 U/L              6-45        

 

                    Anion Gap           16                  6-14        

 

                    AST                 25 U/L              2-40        

 

                    BUN                 8 mg/dL             5-25        

 

                    Calcium             8.7 mg/dL           8.3-10.4        

 

                    Chloride            103 mmol/L                   

 

                    CO2                 22 mEq/L            22-33        

 

                    Creat               0.72 mg/dL           0.50-1.50        

 

                    eGFR                106 mL/min/1.73m2           >59        

 

                    Globulin            2.3 g/dL            2.3-3.5        

 

                    Glucose             135 mg/dL                   

 

                    Osmo                281                 280-295        

 

                    Potassium           4.5 mmol/L           3.5-5.3        

 

                    Sodium              136 mmol/L           134-148        

 

                    TBil                0.9 mg/dL           0.2-1.2        

 

                    TP                  5.4 g/dL            6.0-8.3        

 

                                        Vancomycin Trough - 18 11:00      

   

 

                    Vanco Trough           15.8 ug/mL           10.0-20.0       

 

 

                                        Comprehensive Metabolic Panel - 18

 05:51         

 

                    Albumin             3.4 g/dL            3.6-5.1        

 

                    ALP                 77 U/L                      

 

                    ALT                 19 U/L              6-45        

 

                    Anion Gap           14                  6-14        

 

                    AST                 21 U/L              2-40        

 

                    BUN                 4 mg/dL             5-25        

 

                    Calcium             9.3 mg/dL           8.3-10.4        

 

                    Chloride            103 mmol/L                   

 

                    CO2                 25 mEq/L            22-33        

 

                    Creat               0.82 mg/dL           0.50-1.50        

 

                    eGFR                92 mL/min/1.73m2           >59        

 

                    Globulin            2.7 g/dL            2.3-3.5        

 

                    Glucose             148 mg/dL                   

 

                    Osmo                285                 280-295        

 

                    Potassium           4.2 mmol/L           3.5-5.3        

 

                    Sodium              138 mmol/L           134-148        

 

                    TBil                0.9 mg/dL           0.2-1.2        

 

                    TP                  6.1 g/dL            6.0-8.3        

 

                                        Digoxin - 19 13:43         

 

                    Digoxin             0.6 ng/mL           0.5-1.5        

 

                                        Rapid Drug Screen + ETOH,Medical -  13:43         

 

                    Amphetamine           NEGATIVE            NEGATIVE        

 

                    Barbiturates           NEGATIVE            NEGATIVE        

 

                    Benzodiazepines           NEGATIVE            NEGATIVE      

  

 

                    Cocaine             NEGATIVE            NEGATIVE        

 

                    Ethanol, Urine           71.50 mg/dL           20.00-80.00  

      

 

                    Marijuana           NEGATIVE            NEGATIVE        

 

                    Methylenedioxymethamphetamine           NEGATIVE            

NEGATIVE        

 

                    Opiates             NEGATIVE            NEGATIVE        

 

                    Oxycodone           NEGATIVE            NEGATIVE        

 

                    Phencyclidine           NEGATIVE            NEGATIVE        

 

                    Propoxyphene           NEGATIVE            NEGATIVE        

 

                    Tricyclic Antidepressant           NEGATIVE            NEGAT

CRISS        

 

                                        Thyroid Stimulating Hormone - 19 1

3:43         

 

                    TSH                 1.95 mIU/mL           0.32-5.00        

 

                                        Blood Culture - 19 13:43         

 

                          PRELIM CULTURE RESULTS           Blood Culture Negativ

e, No Growth Day 1        

                                                 

 

                          FINAL CULTURE RESULTS           Blood Culture Negative

, No Growth Day 5         

                                                 

 

                    MEDIA PLATED           Blood Culture Media Position B32     

                

 

                    CULTURE SOURCE           left arm                     

 

                                        Urine Culture - 19 13:43         

 

                          PRELIM CULTURE RESULTS           >100,000 Gram Positiv

e MAHSA / ID to Follow      

                                                 

 

                    CULTURE SOURCE           Cath                         

 

                                        Blood Culture - 19 15:58         

 

                          PRELIM CULTURE RESULTS           Blood Culture Negativ

e, No Growth Day 1        

                                                 

 

                          FINAL CULTURE RESULTS           Blood Culture Negative

, No Growth Day 5         

                                                 

 

                    MEDIA PLATED           Blood Culture Media Position C43     

                

 

                    CULTURE SOURCE           Right Hand                     

 

                                        Protime  - 19 16:48         

 

                    INR                 1.1                 1.0-4.0        

 

                    Protime             12.7 Sec            9.9-12.8        

 

                                        Cardiac Panel - 19 19:23         

 

                    CK                  138 U/L                     

 

                    CK-MB               1.3 ng/ml           0.0-9.2        

 

                    Myoglobin           72.5 ng/ml           1.6-154.9        

 

                    Troponin            <0.020 ng/mL           0.0-0.4        

 

                                        EKG - 19 20:34         

 

                    EKG                 Complete                     

 

                                        Comprehensive Metabolic Panel - 19

 05:53         

 

                    Albumin             3.5 g/dL            3.6-5.1        

 

                    ALP                 71 U/L                      

 

                    ALT                 27 U/L              6-45        

 

                    Anion Gap           15                  6-14        

 

                    AST                 68 U/L              2-40        

 

                    BUN                 8 mg/dL             5-25        

 

                    Calcium             8.3 mg/dL           8.3-10.4        

 

                    Chloride            105 mmol/L                   

 

                    CO2                 25 mEq/L            22-33        

 

                    Creat               0.87 mg/dL           0.50-1.50        

 

                    eGFR                85 mL/min/1.73m2           >59        

 

                    Globulin            2.4 g/dL            2.3-3.5        

 

                    Glucose             120 mg/dL                   

 

                    Osmo                291                 280-295        

 

                    Potassium           3.6 mmol/L           3.5-5.3        

 

                    Sodium              141 mmol/L           134-148        

 

                    TBil                2.0 mg/dL           0.2-1.2        

 

                    TP                  5.9 g/dL            6.0-8.3        

 

                                        MRSA Screen - 19 06:47         

 

                    FINAL CULTURE RESULTS           MRSA Negative Nasal Culture 

                    

 

                                        Comprehensive Metabolic Panel - 19

 05:00         

 

                    Albumin             3.2 g/dL            3.6-5.1        

 

                    ALP                 70 U/L                      

 

                    ALT                 21 U/L              6-45        

 

                    Anion Gap           13                  6-14        

 

                    AST                 42 U/L              2-40        

 

                    BUN                 6 mg/dL             5-25        

 

                    Calcium             7.9 mg/dL           8.3-10.4        

 

                    Chloride            103 mmol/L                   

 

                    CO2                 26 mEq/L            22-33        

 

                    Creat               0.89 mg/dL           0.50-1.50        

 

                    eGFR                83 mL/min/1.73m2           >59        

 

                    Globulin            2.3 g/dL            2.3-3.5        

 

                    Glucose             137 mg/dL                   

 

                    Osmo                285                 280-295        

 

                    Potassium           3.6 mmol/L           3.5-5.3        

 

                    Sodium              138 mmol/L           134-148        

 

                    TBil                1.4 mg/dL           0.2-1.2        

 

                    TP                  5.5 g/dL            6.0-8.3        

 

                                        C.difficile, DNA Amplification - 

9 23:36         

 

                          C.difficile, DNA Amplification           NEGATIVE: No 

DNA evidence of toxogenic 

C. difficile detected.                  Negative        

 

                                        Comprehensive Metabolic Panel - 19

 05:10         

 

                    Albumin             3.2 g/dL            3.6-5.1        

 

                    ALP                 66 U/L                      

 

                    ALT                 19 U/L              6-45        

 

                    Anion Gap           12                  6-14        

 

                    AST                 34 U/L              2-40        

 

                    BUN                 10 mg/dL            5-25        

 

                    Calcium             8.0 mg/dL           8.3-10.4        

 

                    Chloride            105 mmol/L                   

 

                    CO2                 25 mEq/L            22-33        

 

                    Creat               0.85 mg/dL           0.50-1.50        

 

                    eGFR                88 mL/min/1.73m2           >59        

 

                    Globulin            2.3 g/dL            2.3-3.5        

 

                    Glucose             118 mg/dL                   

 

                    Osmo                285                 280-295        

 

                    Potassium           3.6 mmol/L           3.5-5.3        

 

                    Sodium              138 mmol/L           134-148        

 

                    TBil                0.9 mg/dL           0.2-1.2        

 

                    TP                  5.5 g/dL            6.0-8.3        

 

                                        Comprehensive Metabolic Panel - 19

 05:20         

 

                    Albumin             3.7 g/dL            3.6-5.1        

 

                    ALP                 70 U/L                      

 

                    ALT                 20 U/L              6-45        

 

                    Anion Gap           15                  6-14        

 

                    AST                 34 U/L              2-40        

 

                    BUN                 8 mg/dL             5-25        

 

                    Calcium             8.9 mg/dL           8.3-10.4        

 

                    Chloride            102 mmol/L                   

 

                    CO2                 26 mEq/L            22-33        

 

                    Creat               0.89 mg/dL           0.50-1.50        

 

                    eGFR                83 mL/min/1.73m2           >59        

 

                    Globulin            2.5 g/dL            2.3-3.5        

 

                    Glucose             112 mg/dL                   

 

                    Osmo                286                 280-295        

 

                    Potassium           3.9 mmol/L           3.5-5.3        

 

                    Sodium              139 mmol/L           134-148        

 

                    TBil                1.1 mg/dL           0.2-1.2        

 

                    TP                  6.2 g/dL            6.0-8.3        

 

                                        Comprehensive Metabolic Panel - 19

 05:15         

 

                    Albumin             3.4 g/dL            3.6-5.1        

 

                    ALP                 61 U/L                      

 

                    ALT                 19 U/L              6-45        

 

                    Anion Gap           13                  6-14        

 

                    AST                 34 U/L              2-40        

 

                    BUN                 7 mg/dL             5-25        

 

                    Calcium             8.6 mg/dL           8.3-10.4        

 

                    Chloride            104 mmol/L                   

 

                    CO2                 25 mEq/L            22-33        

 

                    Creat               0.86 mg/dL           0.50-1.50        

 

                    eGFR                87 mL/min/1.73m2           >59        

 

                    Globulin            2.3 g/dL            2.3-3.5        

 

                    Glucose             98 mg/dL                    

 

                    Osmo                283                 280-295        

 

                    Potassium           3.9 mmol/L           3.5-5.3        

 

                    Sodium              138 mmol/L           134-148        

 

                    TBil                1.0 mg/dL           0.2-1.2        

 

                    TP                  5.7 g/dL            6.0-8.3        

 

                                        VITAMIN D, 25-H - 19 11:19        

 

 

                    VITAMIN D,25-OH,TOTAL,IA           34 ng/mL            30-10

0        

 

                                        Comprehensive Metabolic Panel - 19

 01:20         

 

                    Albumin             3.9 g/dL            3.6-5.1        

 

                    ALP                 60 U/L                      

 

                    ALT                 14 U/L              6-45        

 

                    Anion Gap           14                  6-14        

 

                    AST                 26 U/L              2-40        

 

                    BUN                 11 mg/dL            5-25        

 

                    Calcium             9.8 mg/dL           8.3-10.4        

 

                    Chloride            106 mmol/L                   

 

                    CO2                 22 mEq/L            22-33        

 

                    Creat               0.93 mg/dL           0.50-1.50        

 

                    eGFR                79 mL/min/1.73m2           >59        

 

                    Globulin            3.1 g/dL            2.3-3.5        

 

                    Glucose             137 mg/dL                   

 

                    Osmo                287                 280-295        

 

                    Potassium           4.0 mmol/L           3.5-5.3        

 

                    Sodium              138 mmol/L           134-148        

 

                    TBil                0.6 mg/dL           0.2-1.2        

 

                    TP                  7.0 g/dL            6.0-8.3        

 

                                        HH - 19 16:01         

 

                    Hct                 40.5 %              42.0-52.0        

 

                    Hgb                 14.3 g/dL           14.0-17.0        

 

                                        Ethanol - 19 13:03         

 

                    Ethanol             294 mg/dL           20-80        

 

                                        Thyroid Stimulating Hormone - 19 1

6:44         

 

                    TSH                 1.22 mIU/mL           0.32-5.00        

 

                                        Rapid Drug Screen + ETOH,Medical -  18:41         

 

                    Amphetamine           NEGATIVE            NEGATIVE        

 

                    Barbiturates           NEGATIVE            NEGATIVE        

 

                    Benzodiazepines           POSITIVE            NEGATIVE      

  

 

                    Cocaine             NEGATIVE            NEGATIVE        

 

                    Ethanol, Urine           304.70 mg/dL           20.00-80.00 

       

 

                    Marijuana           NEGATIVE            NEGATIVE        

 

                    Methylenedioxymethamphetamine           NEGATIVE            

NEGATIVE        

 

                    Opiates             NEGATIVE            NEGATIVE        

 

                    Oxycodone           NEGATIVE            NEGATIVE        

 

                    Phencyclidine           NEGATIVE            NEGATIVE        

 

                    Propoxyphene           NEGATIVE            NEGATIVE        

 

                    Tricyclic Antidepressant           NEGATIVE            NEGAT

CRISS        

 

                                        Comprehensive Metabolic Panel - 19

 05:47         

 

                    Albumin             3.6 g/dL            3.6-5.1        

 

                    ALP                 52 U/L                      

 

                    ALT                 36 U/L              6-45        

 

                    Anion Gap           18                  6-14        

 

                    AST                 74 U/L              2-40        

 

                    BUN                 7 mg/dL             5-25        

 

                    Calcium             8.0 mg/dL           8.3-10.4        

 

                    Chloride            108 mmol/L                   

 

                    CO2                 21 mEq/L            22-33        

 

                    Creat               0.74 mg/dL           0.50-1.50        

 

                    eGFR                103 mL/min/1.73m2           >59        

 

                    Globulin            2.3 g/dL            2.3-3.5        

 

                    Glucose             75 mg/dL                    

 

                    Osmo                292                 280-295        

 

                    Potassium           4.0 mmol/L           3.5-5.3        

 

                    Sodium              143 mmol/L           134-148        

 

                    TBil                1.1 mg/dL           0.2-1.2        

 

                    TP                  5.9 g/dL            6.0-8.3        

 

                                        EKG - 19 11:13         

 

                    EKG                 Complete                     

 

                                        Comprehensive Metabolic Panel - 19

 05:30         

 

                    Albumin             3.3 g/dL            3.6-5.1        

 

                    ALP                 49 U/L                      

 

                    ALT                 27 U/L              6-45        

 

                    Anion Gap           14                  6-14        

 

                    AST                 51 U/L              2-40        

 

                    BUN                 6 mg/dL             5-25        

 

                    Calcium             8.1 mg/dL           8.3-10.4        

 

                    Chloride            108 mmol/L                   

 

                    CO2                 23 mEq/L            22-33        

 

                    Creat               0.76 mg/dL           0.50-1.50        

 

                    eGFR                100 mL/min/1.73m2           >59        

 

                    Globulin            2.1 g/dL            2.3-3.5        

 

                    Glucose             109 mg/dL                   

 

                    Osmo                289                 280-295        

 

                    Potassium           4.0 mmol/L           3.5-5.3        

 

                    Sodium              141 mmol/L           134-148        

 

                    TBil                2.0 mg/dL           0.2-1.2        

 

                    TP                  5.4 g/dL            6.0-8.3        

 

                                        Comprehensive Metabolic Panel - 19

 05:24         

 

                    Albumin             3.3 g/dL            3.6-5.1        

 

                    ALP                 53 U/L                      

 

                    ALT                 23 U/L              6-45        

 

                    Anion Gap           14                  6-14        

 

                    AST                 36 U/L              2-40        

 

                    BUN                 4 mg/dL             5-25        

 

                    Calcium             8.1 mg/dL           8.3-10.4        

 

                    Chloride            108 mmol/L                   

 

                    CO2                 23 mEq/L            22-33        

 

                    Creat               0.75 mg/dL           0.50-1.50        

 

                    eGFR                101 mL/min/1.73m2           >59        

 

                    Globulin            2.2 g/dL            2.3-3.5        

 

                    Glucose             126 mg/dL                   

 

                    Osmo                290                 280-295        

 

                    Potassium           3.9 mmol/L           3.5-5.3        

 

                    Sodium              141 mmol/L           134-148        

 

                    TBil                1.6 mg/dL           0.2-1.2        

 

                    TP                  5.5 g/dL            6.0-8.3        

 

                                        Comprehensive Metabolic Panel - 19

 05:25         

 

                    Albumin             3.3 g/dL            3.6-5.1        

 

                    ALP                 56 U/L                      

 

                    ALT                 20 U/L              6-45        

 

                    Anion Gap           12                  6-14        

 

                    AST                 28 U/L              2-40        

 

                    BUN                 3 mg/dL             5-25        

 

                    Calcium             8.4 mg/dL           8.3-10.4        

 

                    Chloride            108 mmol/L                   

 

                    CO2                 26 mEq/L            22-33        

 

                    Creat               0.77 mg/dL           0.50-1.50        

 

                    eGFR                98 mL/min/1.73m2           >59        

 

                    Globulin            2.3 g/dL            2.3-3.5        

 

                    Glucose             119 mg/dL                   

 

                    Osmo                291                 280-295        

 

                    Potassium           3.9 mmol/L           3.5-5.3        

 

                    Sodium              142 mmol/L           134-148        

 

                    TBil                1.3 mg/dL           0.2-1.2        

 

                    TP                  5.6 g/dL            6.0-8.3        

 

                                        Lipid Panel - 20 05:45         

 

                    C/HDL               2.4                 3.7-6.7        

 

                    Cholesterol           137 mg/dL           100-240        

 

                    HDL                 58 mg/dL            30-85        

 

                    LDL-Calculated           65 mg/dL            0-100        

 

                    Trig                70 mg/dL                    

 

                    VLDL                14 mg/dL            0-42        

 

                                        Urinalysis - 20 14:11         

 

                    Icotest             N/A                 Negative        

 

                    Urine Volume           Urine Volume Sufficient (10mL)       

              

 

                    Urine-Appearance           Clear               Clear        

 

                    Urine-Bacteria           Trace                        

 

                    Urine-Bilirubin           Negative            Negative      

  

 

                    Urine-Blood           3+                  Negative        

 

                    Urine-Color           Yellow              Colorless-[facility]

ow        

 

                    Urine-Epithelial Cells           5-10/HPF                   

  

 

                    Urine-Glucose           Negative            Negative        

 

                    Urine-Ketones           Negative            Negative        

 

                    Urine-Leukocytes           Negative            Negative     

   

 

                    Urine-Nitrite           Negative            Negative        

 

                    Urine-Other           Culture to follow                     

 

                    Urine-pH            6.0                 5-8.5        

 

                    Urine-Protein           Negative            Negative        

 

                    Urine-RBC           20-40/HPF                     

 

                    Urine-Specific Gravity           1.020               1.000-1

.030        

 

                    Urine-WBC           2-5/HPF                      

 

                    Urobilinogen           1.0 E.U./dL            0.2-1.0       

 

 

                                        Urine Culture - 20 14:11         

 

                    PRELIM CULTURE RESULTS           No Growth 24 hours         

            

 

                    FINAL CULTURE RESULTS           No Growth 48 hours          

           

 

                    MEDIA PLATED           Setup at 14:17 on 2020           

          

 

                    CULTURE SOURCE           clean catch from [facility]               

      

 

                                        Comprehensive Metabolic Panel - 20

 05:29         

 

                    Albumin             3.5 g/dL            3.6-5.1        

 

                    ALP                 52 U/L                      

 

                    ALT                 17 U/L              6-45        

 

                    Anion Gap           13                  6-14        

 

                    AST                 20 U/L              2-40        

 

                    BUN                 9 mg/dL             5-25        

 

                    Calcium             9.2 mg/dL           8.3-10.4        

 

                    Chloride            103 mmol/L                   

 

                    CO2                 28 mEq/L            22-33        

 

                    Creat               0.78 mg/dL           0.50-1.50        

 

                    eGFR                97 mL/min/1.73m2           >59        

 

                    Globulin            2.2 g/dL            2.3-3.5        

 

                    Glucose             119 mg/dL                   

 

                    Osmo                289                 280-295        

 

                    Potassium           4.2 mmol/L           3.5-5.3        

 

                    Sodium              140 mmol/L           134-148        

 

                    TBil                0.7 mg/dL           0.2-1.2        

 

                    TP                  5.7 g/dL            6.0-8.3        

 

                                        Ethanol - 20 09:50         

 

                    Ethanol             125 mg/dL           20-80        



                                                                                
                       



Encounters

      



                ACCT No.           Visit Date/Time           Discharge          

 Status         

             Pt. Type           Provider           Facility           Loc./Unit 

          

Complaint        

 

                [id number]           2020 10:16:00           2020 23:59

:00           

DIS             Outpatient           [facility]                         

      

                                                 

 

                [id number]           2020 13:34:00           2020 23:59

:00           

DIS             Outpatient           [facility]                            

      

                                                 

 

                [id number]           2019 11:40:00           2020 12:10

:00           

DIS                 Inpatient           [facility]                               

 

                [id number]           2019 15:43:00           2019 13:10

:00           

DIS                 Inpatient           [facility]

enter 

                          ICU                                

 

                [id number]           2019 12:09:00           2019 14:30

:00           

DIS                 Outpatient           CHOLO GALLAGHER           [facility]                    ER                                 

 

                [id number]           2019 16:10:00           2019 18:10:

00           DIS

                    Outpatient           Arnoldo Beltran           [facility]   

                          ER                                 

 

                [id number]           2019 15:22:00           2019 17:10:

00           DIS

                Outpatient           [facility]                           

          

                                                 

 

                [id number]           05/15/2019 17:47:00           05/15/2019 18:30:

00           DIS

             Outpatient           [facility]                                

     

        

 

                [id number]           2019 01:07:00           2019 02:24:

00           DIS

                    Outpatient           DONATO BOGGS           [facility]

                          ER                                 

 

                [id number]           2019 11:06:00           2019 15:14:

00           DIS

                Outpatient           [facility]                            

          

                                                 

 

                [id number]           2019 18:45:00           2019 13:00:

00           DIS

                    Inpatient           [facility]

enter     

                          ICU                                

 

                [id number]           08/10/2018 12:44:00           2018 17:27:

00           DIS

                    Inpatient           [facility]

enter     

                          MED-SURG                           

 

                [id number]           2018 19:04:00           08/10/2018 13:01:

00           DIS

                    Inpatient           [facility]

enter     

                          ICU                                

 

                [id number]           2018 16:04:00           2018 13:23:

00           DIS

                    Inpatient           [facility] 

enter     

                          MED-SURG                           

 

             [id number]           2020 10:33:00                              

       Document

Registration                                                                    

 

             [id number]           2020 11:34:03                              

       Document

Registration                                                                    

 

             [id number]           2018 16:56:37                               

      Document 

Registration                                                                    

 

             [id number]           2019 00:55:00                               

      Document 

Registration                                                                    

 

                    [id number]           2019 11:50:00            23:59:59        

                CLS             Outpatient           LUISANA REESE DO        

   [facility]           RAD                       CYST        

 

                    [id number]           2019 15:24:00            23:59:59        

                CLS             Outpatient           LUISANA REESE DO        

   [facility]           RAD                       BACK PAIN        

 

                [id number]           2019 14:00:00           2019 23:59:

59           CLS

                Outpatient           TAVIA BLAIR                          

 [facility]                                             

 

             [id number]           2019 10:40:00                              

       Document

Registration

## 2020-05-29 NOTE — OCCUPATIONAL THERAPY EVAL
OT Evaluation-General/PLF


Medical Diagnosis


Admission Date


May 29, 2020 at 05:00


Medical Diagnosis:  pneumonia/N&V/a-fib


Onset Date:  May 29, 2020





Therapy Diagnosis


Therapy Diagnosis:  impaired ADLs/functional mobility





Precautions


Precautions/Isolations:  Droplet Isolation, Fall Prevention





Referral


Physician:  Reji


Referral Reason:  Evaluation/Treatment





Medical History


Pertinent Medical History:  Atrial Fib, Alcoholism, MI, Neuropathy


Current History


Per H&P: "Duke Mandujano is a 75-year-old male with past medical history of 

hypertension, BPH, atrial fibrillation on anticoagulation, who presented with 

nausea and vomiting.  She reports that he is been having nausea and vomiting a 

couple times a week for about a year.  He denies any known aggravating factors. 

He reports that he has had weight loss of about 50 pounds over the past year.  

He says it was intentional as he has cut out carbohydrates and has been 

exercising.  He denies any abdominal pain.  He denies any diarrhea.  He says he 

has occasional constipation.  He denies any fevers or chills.  He denies any 

chest pain.  He denies any shortness of breath.  He has a chronic cough which is

been present for a couple years and is unchanged.  He says that he frequently 

falls at home.  He has had home health past but does not currently have the 

services set up.  He uses a walker that has a seat.  He lives at home alone in 

[place]."





Social History


Home:  Single Level


Current Living Status:  Alone


 Steps Inside Home:  1 (9 inch sunk-in living room)





ADL-Prior Level of Function


SCALE: Activities may be completed with or without assistive devices.





6-Indepedent-patient completes the activity by him/herself with no assistance 

from a helper.


5-Set-up or Clean-up Assistance-helper sets up or cleans up; patient completes 

activity. [place] assists only prior to or  


    following the activity.


4-Supervision or Touching Assistance-helper provides verbal cues and/or touc

roni/steadying and/or contact guard assistance as patient completes activity. 

Assistance may be provided   


    throughout the activity or intermittently.


3-Partial/Moderate Assistance-helper does LESS THAN HALF the effort. False [place] 

lifts, holds or supports trunk or limbs, but provides less than half the effort.


2-Substantial/Maximal Assistance-helper does MORE THAN HALF the effort. False [place] 

lifts or holds trunk or limbs and provides more than half the effort.


1-Tgtiptrnb-jwivit does ALL the effort. Patient does none of the effort to 

complete the activity. Or, the assistance of 2 or more helpers is required for 

the patient to complete the  


    activity.


If activity was not attempted, code reason:


7-Patient Refused.


9-Not Applicable-not attempted and the patient did not perform the activity 

before the current illness, exacerbation or injury.


10-Not Attempted due to Environmental Limitations-(lack of equipment, weather 

restraints, etc.).


88-Not Attempted due to Medical Conditions or Safety Concerns.


ADL PLOF Comments


Pt reports he lives alone and is independent with all ADLs. He has a house 

 come in x1 a week for ~5 hours, she cleans and does some light cooking. 

He uses a 4WW within his house, a FWW in the community, and a SPC to get from 

car to the inside of stores. Once in a store he typically uses a motorized cart.

He has both a walkin shower and a tub/shower with a bath bench at the tub.


Self Care:  Needed Some Help


Functional Cognition:  Independent


DME/Equipment:  Bath Bench, Shower, Tub/Shower


DME/Equipment Comments


FWW, 4WW, cane





OT Current Status


Subjective


Pt seated in recliner, agreeable to OT evaluation. He did not verbalize any pain

during tx. Pt reports he is hoping to d/c tomorrow.





Current


Glasses/Contacts:  Yes


Hearing Aids:  No


Dentures/Partials:  No


Hand Dominance:  Right


Upper Extremity ROM


WFL, BUE shoulder flexion to approx 160 degrees, he is able to touch the back of

his head with his hands.


Upper Extremity Coordination


WFL


Upper Extremity Sensation


Pt denies tingling/numbness BUE


Upper Extremity Strength


grossly 4/5 MMT





Other Treatments


Pt seated in recliner, agreeable to OT evaluation. OT educated pt on purpose and

benefits of OT, he verbalized understanding. Pt provided information about PLOF 

and home set up, then participated in UE screen. Pt reports he feels like he 

would have no difficulties with ADLs upon returning home and feels like he is at

his PLOF. When asked how many times he falls at home, he states "no more than 

usually", approx x1 a week but he feels like they are "controlled" falls. OT 

educated pt on benefits of OT to increase UE strength, functional mobility, and 

functional endurance with tasks. Post OT session, pt seated in recliner, call l

ight in reach and all needs met.





Education


OT Patient Education:  Correct positioning, Modified ADL techniques, Progress 

toward Goal/Update tx plan, Purpose of tx/functional activities


Teaching Recipient:  Patient


Teaching Methods:  Discussion


Response to Teaching:  Verbalize Understanding





OT Long Term Goals


Long Term Goals


Time Frame:  Jun 5, 2020


Eating (QC):  6


Oral Hygiene (QC):  6


Toileting Hygiene (QC):  6


Shower/Bathe Self (QC):  6


Upper Body Dressing (QC):  6


Lower Body Dressing (QC):  6


On/Off Footwear (QC):  6


1=Demonstrate adherence to instructed precautions during ADL tasks.


2=Patient will verbalize/demonstrate understanding of assistive 

devices/modifications for ADL.


3=Patient will improve strength/tolerance for activity to enable patient to 

perform ADL's.





OT Education/Plan


Problem List/Assessment


Assessment:  Decreased Activ Tolerance, Impaired I ADL's, Impaired Self-Care 

Skills





Discharge Recommendations


Plan/Recommendations:  Continue POC


Therapy Discharge Recommendati:  Home & Family





Treatment Plan/Plan of Care


Treatment,Training & Education:  Yes


Patient would benefit from OT for education, treatment and training to promote 

independence in ADL's, mobility, safety and/or upper extremity function for 

ADL's.


Plan of Care:  ADL Retraining, Functional Mobility, UE Funct Exercise/Act


Treatment Duration:  Jun 5, 2020


Frequency:  5 times per week


Estimated Hrs Per Day:  .25 hour per day


Rehab Potential:  Fair





Time/GCodes


Start Time:  14:30


Stop Time:  14:38


Total Time Billed (hr/min):  8


Billed Treatment Time


1YAMILE ADDISON OT          May 29, 2020 14:47

## 2020-05-29 NOTE — NUR
ALKA DOW CALLED THIS RN BACK AFTER LEAVING MESSAGE FOR HIM. HE REPORTS THAT PT HAS 
STARTED DRINKING [id number] HEAVILY LAST COUPLE YEARS WHICH HAS MADE HIM FALL MORE. HE SAYS 
THAT HE ALSO HAS BEEN IN AND OUT OF NURSING HOMES RECENTLY AS WELL. THIS RN WILL LET DR MAYA KNOW THIS INFO.

## 2020-05-29 NOTE — NUR
Received dietary consult for MST score. 



Note pt is currently PUI for COVID-19 test pending. Will monitor PO intake and reassess on 
6/1. 



SValerie Chance MS, RD, LD

## 2020-05-29 NOTE — NUR
NAM MONSALVE admitted to room 433-1, with an admitting diagnosis of NAUSEA/VOMITING, on 
05/29/20 from ED via , accompanied by 2 RN'S.NAM MONSALVE introduced to surroundings, 
call light, bed controls, phone, TV, temperature control, lights, meal times, smoking 
policy, visitor policy, side rail policy, bathrooms and showers.  Patient Rights given to 
patient in the handbook. NAM MONSALVE verbalizes understanding that [facility] is not 
responsible for the loss or damage to any personal effects or valuables that are kept in the 
patients posession during their hospitalization.

## 2020-05-29 NOTE — NUR
PT ASKED FOR GABAPENTIN THAT HE USUALLY TAKES AT HOME. DR MAYA CONTACTED, AND ORDERED TO 
RESTART HIS HOME MED THAT HAS BEEN REVIEWED

-------------------------------------------------------------------------------

Addendum: 05/30/20 at 0348 by RAMIRO BERNAL RN

-------------------------------------------------------------------------------

PT ASKED FOR GABAPENTIN THAT HE USUALLY TAKES AT HOME. DR MAYA CONTACTED, AND ORDERED TO 
RESTART HIS GABAPENTIN THAT HAS BEEN REVIEWED

## 2020-05-29 NOTE — ED GI
General


Chief Complaint:  Abdominal/GI Problems


Stated Complaint:  NAUSEA/VOMITING


Nursing Triage Note:  


C/O NAUSEA, VOMITING AND WEAKNESS X 2 DAYS, VERBALIZES HAS VOMITED 3-4 TIMES IN 


24 HRS. VERBALIZES FEELS VERY WEAK, HAD A FALL AT HOME YESTERDAY AM WAS TREATED 


AND RELEASED AT [facility].


Sepsis Screen:  No Definite Risk


Source of Information:  Patient (PT IS A VERY LIMITED HISTORIAN, ESPECIALLY 

ABOUT PMH, AND DOES NOT KNOW ANY OF HIS MEDICATIONS. ), EMS





History of Present Illness


Date Seen by Provider:  May 29, 2020


Time Seen by Provider:  02:38


Initial Comments


PT ARRIVES VIA EMS FROM HOME--PT LIVES ALONE


C/O NAUSEA AND VOMITING FOR THE LAST 2 DAYS


HAS VOMITED 3-4 TIMES IN LAST 24 HOURS--EMS GAVE ZOFRAN AND IV FLUIDS


NO DIARRHEA. LAST BM WAS 3 DAYS AGO


NO ABDOMINAL PAIN OR CRAMPING


VOIDED JUST PRIOR TO ARRIVAL


NO FEVER


C/O FEELING WEAK


DENIES ANY DIZZINESS





NO CHEST PAIN OR SHORTNESS OF BREATH


PT HAS CHRONIC COUGH AND IS NO DIFFERENT THAN NORMAL 





EMS REPORT THEY TRANSPORTED PT TO [facility] YESTERDAY 05/28/20 FOR A FALL--"THEY

COULDN'T FIND ANYTHING". PT ADAMANTLY DENIES ANY INJURY OR PAIN FROM THAT.


PT REPORTS TO ME THAT HE WENT TO [facility] FOR THIS ISSUE OF NAUSEA AND 

VOMITING--NO RX'S GIVEN, PER PT. 


PT REPEATEDLY DENIES SYNCOPAL EPISODE





DID CONTACT [facility], AND VERIFIED THAT PT DID GO THERE YESTERDAY 

FOR A REPORTED FALL, NO INJURY IDENTIFIED AND PT DENIED ANY COMPLAINTS OF PAIN. 

WAS PRESCRIBED CEFDINIR FOR UPPER RESPIRATORY INFECTION. HE HAD ALSO REPORTED TO

THEM THAT DR. THOMAS WAS HIS DR. 





STATES HE HAS NOT TAKEN ANY OF HIS MEDICATIONS FOR THE LAST 2 DAYS, BECAUSE HE 

HAS BEEN THROWING UP


STATES HE HAS NOT HAD ANYTHING TO EAT OR DRINK FOR 2 DAYS EITHER.





PCP: DR. THOMAS


PT STATES HE DOES NOT SEE A CARDIOLOGIST, BUT HAS ATRIAL FIBRILLATION AND IS ON 

A BLOOD THINNER





Allergies and Home Medications


Allergies


Coded Allergies:  


     No Known Drug Allergies (Unverified , 5/29/20)





Patient Home Medication List


Home Medication List Reviewed:  Yes





Review of Systems


Review of Systems


Constitutional:  see HPI; No chills, No diaphoresis, No dizziness, No fever; 

weakness


EENTM:  No Symptoms Reported


Respiratory:  See HPI, Cough; Denies Shortness of Air, Denies Wheezing


Cardiovascular:  No Symptoms Reported; Denies Chest Pain, Denies Edema, Denies L

ightheadedness, Denies Palpitations, Denies Syncope


Gastrointestinal:  See HPI; Denies Abdominal Pain; Constipated; Denies Diarrhea;

Nausea, Vomiting


Genitourinary:  No Symptoms Reported


Musculoskeletal:  no symptoms reported; No back pain, No joint pain, No neck 

pain


Skin:  no symptoms reported


Psychiatric/Neurological:  No Symptoms Reported; Denies Headache


Endocrine:  No Symptoms Reported


Hematologic/Lymphatic:  No Symptoms Reported





Past Medical-Social-Family Hx


Past Med/Social Hx:  Reviewed and Corrections made


Patient Social History


Alcohol Use:  Regular Use ("AT LEAST 2 HIGHBALLS EVERY NIGHT" )


Number of Drinks Today:  0


Recreational Drug Use:  No


Smoking Status:  Former Smoker (QUIT 1975)


Type Used:  Pipe


Recent Foreign Travel:  No


Contact w/Someone Who Travel:  No


Recent Infectious Disease Expo:  No


Physical Abuse:  No


Sexual Abuse:  No


Mistreated:  No


Fear:  No





Immunizations Up To Date


Tetanus Booster (TDap):  Less than 5yrs


PED Vaccines UTD:  Yes





Past Medical History


Surgeries:  Yes (BACK SURGERY-DR. REESE)


Orthopedic, Tonsillectomy


Respiratory:  No


Cardiac:  Yes


Atrial Fibrillation, Hypertension


Neurological:  No (? NEUROPATHY OR RESTLESS LEGS ? )


Genitourinary:  Yes (PROSTATE PROBLEMS PER MEDICATION LIST-FLOMAX AND 

FINASTERIDE)


Prostate Problems


Gastrointestinal:  No


Musculoskeletal:  Yes (S/P BACK SURGERY)


Chronic Back Pain


Endocrine:  No


HEENT:  No


Cancer:  No


Psychosocial:  Yes (ON EFFEXOR--UNKNOWN DX )


Integumentary:  No


Blood Disorders:  No





Physical Exam


Vital Signs





Vital Signs - First Documented








 5/29/20





 02:50


 


Temp 35.8


 


Pulse 98


 


Resp 20


 


B/P (MAP) 145/93 (110)


 


Pulse Ox 95





Capillary Refill : Less Than 3 Seconds


Height/Weight/BMI


Height: '"


Weight: lbs. oz. kg; 28.00 BMI


Method:


General Appearance:  WD/WN, no apparent distress


HEENT:  PERRL/EOMI, other (ORAL MUCOSA SLIGHTLY DRY)


Respiratory:  normal breath sounds, no respiratory distress, no accessory muscle

use, other (OCCSIONAL, MILD NON-PRODUCTIVE COUGH)


Cardiovascular:  normal peripheral pulses, no edema, no JVD, no murmur, 

irregularly irregular


Gastrointestinal:  normal bowel sounds, non tender, soft, no organomegaly, no 

pulsatile mass; No distended, No guarding, No rebound, No tenderness, No hernia,

No mass


Extremities:  normal inspection, no pedal edema, no calf tenderness, normal 

capillary refill


Back:  no CVA tenderness


Neurologic/Psychiatric:  CNs II-XII nml as tested, no motor/sensory deficits, 

alert, normal mood/affect, oriented x 3 (BUT POOR MEMORY)


Skin:  normal color, warm/dry; No ecchymosis, No rash; other (NO EXTERNAL 

EVIDENCE OF TRAUMA ANYWHERE)





Progress/Results/Core Measures


Results/Orders


Lab Results





Laboratory Tests








Test


 5/29/20


02:50 5/29/20


04:36 Range/Units


 


 


White Blood Count


 6.9 


 


 4.3-11.0


10^3/uL


 


Red Blood Count


 4.50 


 


 4.35-5.85


10^6/uL


 


Hemoglobin 13.3   13.3-17.7  G/DL


 


Hematocrit 39 L  40-54  %


 


Mean Corpuscular Volume 86   80-99  FL


 


Mean Corpuscular Hemoglobin 30   25-34  PG


 


Mean Corpuscular Hemoglobin


Concent 34 


 


 32-36  G/DL





 


Red Cell Distribution Width 14.1   10.0-14.5  %


 


Platelet Count


 264 


 


 130-400


10^3/uL


 


Mean Platelet Volume 8.8   7.4-10.4  FL


 


Neutrophils (%) (Auto) 77 H  42-75  %


 


Lymphocytes (%) (Auto) 13   12-44  %


 


Monocytes (%) (Auto) 10   0-12  %


 


Eosinophils (%) (Auto) 0   0-10  %


 


Basophils (%) (Auto) 0   0-10  %


 


Neutrophils # (Auto) 5.3   1.8-7.8  X 10^3


 


Lymphocytes # (Auto) 0.9 L  1.0-4.0  X 10^3


 


Monocytes # (Auto) 0.7   0.0-1.0  X 10^3


 


Eosinophils # (Auto)


 0.0 


 


 0.0-0.3


10^3/uL


 


Basophils # (Auto)


 0.0 


 


 0.0-0.1


10^3/uL


 


Prothrombin Time 16.7 H  12.2-14.7  SEC


 


INR Comment 1.3   0.8-1.4  


 


Activated Partial


Thromboplast Time 39 H


 


 24-35  SEC





 


Sodium Level 138   135-145  MMOL/L


 


Potassium Level 3.9   3.6-5.0  MMOL/L


 


Chloride Level 100     MMOL/L


 


Carbon Dioxide Level 23   21-32  MMOL/L


 


Anion Gap 15 H  5-14  MMOL/L


 


Blood Urea Nitrogen 7   7-18  MG/DL


 


Creatinine


 0.84 


 


 0.60-1.30


MG/DL


 


Estimat Glomerular Filtration


Rate > 60 


 


  





 


BUN/Creatinine Ratio 8    


 


Glucose Level 165 H    MG/DL


 


Calcium Level 9.3   8.5-10.1  MG/DL


 


Corrected Calcium 9.5   8.5-10.1  MG/DL


 


Magnesium Level 1.6   1.6-2.4  MG/DL


 


Total Bilirubin 0.8   0.1-1.0  MG/DL


 


Aspartate Amino Transf


(AST/SGOT) 20 


 


 5-34  U/L





 


Alanine Aminotransferase


(ALT/SGPT) 10 


 


 0-55  U/L





 


Alkaline Phosphatase 76     U/L


 


Troponin I < 0.028   <0.028  NG/ML


 


Total Protein 7.4   6.4-8.2  GM/DL


 


Albumin 3.7   3.2-4.5  GM/DL


 


Amylase Level 35     U/L


 


Lipase 22   8-78  U/L


 


Digoxin Level


 0.42 L


 


 0.80-2.00


NG/ML








My Orders





Orders - ROXIE CARD DO


Ed Iv/Invasive Line Start (5/29/20 03:07)


Ekg Tracing (5/29/20 03:07)


Monitor-Rhythm Ecg Trace Only (5/29/20 03:07)


Amylase (5/29/20 03:07)


Cbc With Automated Diff (5/29/20 03:07)


Comprehensive Metabolic Panel (5/29/20 03:07)


Lipase (5/29/20 03:07)


Magnesium (5/29/20 03:07)


Protime With Inr (5/29/20 03:07)


Partial Thromboplastin Time (5/29/20 03:07)


Ua Culture If Indicated (5/29/20 03:07)


Ed Iv/Invasive Line Start (5/29/20 03:07)


Ed Iv/Invasive Line Start (5/29/20 03:07)


Lactated Ringers (Lr 1000 Ml Iv Solution (5/29/20 03:07)


Ct Abdomen/Pelvis W (5/29/20 03:39)


Chest 1 View, Ap/Pa Only (5/29/20 03:39)


Digoxin (5/29/20 03:53)


Iohexol Injection (Omnipaque 350 Mg/Ml 1 (5/29/20 04:00)


Received Contrast (Hold Metformin- Contr (5/29/20 04:00)


Ns (Ivpb) (Sodium Chloride 0.9% Ivpb Bag (5/29/20 04:00)


Troponin I (5/29/20 04:31)


Ondansetron Injection (Zofran Injectio (5/29/20 04:45)


Ceftriaxone  For Iv Use (Rocephin  For I (5/29/20 05:00)


Azithromycin Injection (Zithromax Inject (5/29/20 05:00)


Fibrin Degradation Products (5/29/20 04:53)


Procalcitonin (Pct) (5/29/20 04:53)


Hs C Reactive Protein (5/29/20 04:53)


Erythrocyte Sedimentation Rate (5/29/20 04:53)


LDH (5/29/20 04:53)


Blood Culture (5/29/20 04:53)


Coronavirus Sars-Cov-2 So 2019 (5/29/20 04:53)


Adenovirus Detection By Pcr (5/29/20 04:53)


Parainfluenza Virus 1,2,3 Pcr (5/29/20 04:53)





Medications Given in ED





Current Medications








 Medications  Dose


 Ordered  Sig/Ortiz


 Route  Start Time


 Stop Time Status Last Admin


Dose Admin


 


 Iohexol  100 ml  ONCE  ONCE


 IV  5/29/20 04:00


 5/29/20 04:01 UNV 5/29/20 04:27


100 ML


 


 Lactated Ringer's  1,000 ml @ 


 0 mls/hr  Q0M ONCE


 IV  5/29/20 03:07


 5/29/20 03:10 DC 5/29/20 03:26


1,000 MLS/HR


 


 Ondansetron HCl  8 mg  ONCE  ONCE


 IVP  5/29/20 04:45


 5/29/20 04:46 UNV 5/29/20 04:35


8 MG


 


 Sodium Chloride  80 ml  ONCE  ONCE


 IV  5/29/20 04:00


 5/29/20 04:01 UNV 5/29/20 04:27


80 ML








Vital Signs/I&O











 5/29/20





 02:50


 


Temp 35.8


 


Pulse 98


 


Resp 20


 


B/P (MAP) 145/93 (110)


 


Pulse Ox 95














Blood Pressure Mean:                    110











Progress


Progress Note :  


Progress Note


ON REVIEWING MEDICATIONS FROM MED RECONCILIATION ( PT HAS NOT BEEN HERE BEFORE),

IT APPEARS THAT PT IS SUPPOSED TO BE ON MULTIPLE MEDICATIONS FOR VARIOUS 

PROBLEMS, BUT HAVE NOT BEEN FILLED SINCE 02/27/20. PT DID RECEIVE A PRESCRIPTION

FOR CEFDINIR 300 MG #20, FROM NP AT [facility]. 


ALSO OF NOTE. RX'S THAT WERE PRESCRIBED IN JANUARY AND PRIOR, WERE BY NP TRISH BLAIR--PT REPEATEDLY STATES THAT DR. THOMAS IS HIS DR AND HE DOES NOT SEE 

ANYONE ELSE, INCLUDING NO CARDIOLOGIST. 





NO VOMITING DURING ER STAY, AND STATES NAUSEA IS BETTER AFTER ZOFRAN BY EMS





VITALS STABLE





UNEVENTFUL ER STAY


Initial ECG Impression Date:  May 29, 2020


Initial ECG Impression Time:  03:30


Initial ECG Rate:  82


Initial ECG Rhythm:  A Fib/Flutter (WITH PVC'S)


Initial ECG Comparisson:  No Previous ECG Available





Diagnostic Imaging





Comments


CXR--POOR INSPIRATION, ? MILD VASCULAR CONGESTION? --PENDING RADIOLOGIST REVIEW





CT ABDOMEN/PELVIS--LLL CONSOLIDATION, CYSTIC LESION 6.9 CM FROM LEFT KIDNEY--NO 

OTHER ACUTE PROCESS--PER [facility] VIA FAX AT [id number]


   Reviewed:  Reviewed by Me





Departure


Communication (Admissions)


0504--SPOKE WITH DR. GAXIOLA, HOSPITALIST COVERING DR. THOMAS'S PT'S. ACCEPTS 

PT FOR ADMIT.





Impression





   Primary Impression:  


   LLL pneumonia


   Additional Impressions:  


   Nausea & vomiting


   Chronic atrial fibrillation


   COVID P.U.I.


   SUBTHERAPEUTIC DIGOXIN LEVEL


Disposition:  09 ADMITTED AS INPATIENT


Condition:  Improved





Departure-Patient Inst.


Referrals:  


NO,LOCAL PHYSICIAN (PCP)


Primary Care Physician








TAVIA BLAIR (Family)


Primary Care Physician











ROXIE CARD DO                 May 29, 2020 03:23

## 2020-05-29 NOTE — XMS REPORT
[facility]

                             Created on: 2020



LilacharlieDuke

External Reference #: [id number]

: 1945

Sex: Male



Demographics





                          Address                   [address] [place]  [address]

 

                          Preferred Language        Unknown

 

                          Marital Status            Unknown

 

                          Roman Catholic Affiliation     Unknown

 

                          Race                      Unknown

 

                          Ethnic Group              Unknown





Author





                          Author                    ROSSY Duke SQUIRES

 

                          [facility]

 

                          Address                   [address] [place]  [address]



 

                          Phone                     [phone]







Care Team Providers





                    Care Team Member Name Role                Phone

 

                    TAVIA BLAIR    Unavailable         [phone]







PROBLEMS





          Type      Condition ICD9-CM Code TEZ60-FE Code Onset Dates Condition S

tatus SNOMED 

Code

 

           Problem    Chronic bilateral low back pain without sciatica          

  M54.5      08 Sep, 2017 

Active                                  [id number]

 

          Problem   Other abnormal glucose           R73.09              Active 

   [id number]

 

          Problem   Hyperlipidemia           E78.5               Active    04960

004

 

          Problem   Chronic fatigue           R53.82              Active    5270



 

          Problem   History of fusion of lumbar spine           Z98.1     28 Sep

, 2018 Active    [id number]

 

          Problem   Thrombocytopenia, primary           D69.49    13 2018 A

ctive    [id number]

 

          Problem   Chronic a-fib           I48.2               Active    850448

04

 

          Problem   Essential hypertension           I10                 Active 

   [id number]







ALLERGIES

No Information



ENCOUNTERS





                Encounter       Location        Date            Diagnosis

 

                [address] [address]

 27 Sep, 2019    

Essential hypertension I10 and Chronic a-fib I48.2

 

                [address] [address]

 21 Aug, 2019    

Essential hypertension I10

 

                [address] [address]

     Other 

abnormal glucose R73.09 ; Chronic a-fib I48.2 ; Essential hypertension I10 ; 
Hyperlipidemia E78.5 and History of fusion of lumbar spine Z98.1

 

                    [facility] [place] [address] [id number] [place] [address]                                Chronic a-fib I48.2

 

                [address] [address]

     Viral 

gastroenteritis A08.4 and Chronic fatigue R53.82

 

                [id number] [place] [address]

 22 May, 2019     

 

                [facility] [place]     [id number] [place] [address]

 13 May, 2019     

 

                [facility] [place]     [id number] [place] [address]

 09 May, 2019    

Generalized weakness R53.1 and Toenail fungus B35.1

 

                [facility] [place]     [id number] [place] [address]

     Dark 

urine R82.998 ; Chronic bilateral low back pain without sciatica M54.5 ; 
Essential hypertension I10 and Chronic fatigue R53.82

 

                    [facility] [place] [address] [address] 26 

Mar, 2019                                

 

                [facility] [place]     [id number] [place] [address]

 13 Mar, 2019    

Generalized weakness R53.1 and Fall, initial encounter W19.XXXA

 

                [facility] [place]     [id number] [place] [address]

 13 Mar, 2019     

 

                [facility] [place]     [id number] [place] [address]

 08 Mar, 2019    

Follow-up exam Z09 and Generalized weakness R53.1

 

                [facility] [place]     [id number] [place] [address]

 01 Mar, 2019     

 

                [facility] [place]     [id number] [place] [address]

 01 Mar, 2019     

 

                [facility] [place]     [id number] [place] [address]

      

 

                [facility] [place]     [id number] [place] [address]

      

 

                    [facility] [place] [address] [address]                                 

 

                    [facility] [place] [address] [address] 30 

Dec, 2018                                

 

                    [facility] [place] [address] [address]                                 

 

                    [facility] [address] [facility] [id number] [place] [address] 21 

Sep, 2018                                







IMMUNIZATIONS

No Known Immunizations



SOCIAL HISTORY

Never Assessed



REASON FOR VISIT





PLAN OF CARE





VITAL SIGNS





MEDICATIONS





        Medication Instructions Dosage  Frequency Start Date End Date Duration S

teresa

 

        Verapamil HCl 80 MG Orally 2 times a day 1/2 tablet 12h                 

    30 days Active







RESULTS

No Results



PROCEDURES

No Known procedures



INSTRUCTIONS





MEDICATIONS ADMINISTERED

No Known Medications



MEDICAL (GENERAL) HISTORY





                    Type                Description         Date

 

                    Medical History     Chronic a-fib        

 

                    Medical History     Essential hypertension  

 

                    Medical History     Situational depression  

 

                    Medical History     Hyperlipidemia       

 

                          Medical History           Chronic low back pain withou

t sciatica, unspecified back pain 

laterality                               

 

                    Medical History     Thrombocytopenia     

 

                    Surgical History    tonsillectomy        

 

                    Surgical History    lumbar fusion

## 2020-05-29 NOTE — HISTORY & PHYSICAL-HOSPITALIST
History of Present Illness


HPI/Chief Complaint


Duke Mandujano is a 75-year-old male with past medical history of hypertension, 

BPH, atrial fibrillation on anticoagulation, who presented with nausea and 

vomiting.  She reports that he is been having nausea and vomiting a couple times

a week for about a year.  He denies any known aggravating factors.  He reports 

that he has had weight loss of about 50 pounds over the past year.  He says it 

was intentional as he has cut out carbohydrates and has been exercising.  He 

denies any abdominal pain.  He denies any diarrhea.  He says he has occasional 

constipation.  He denies any fevers or chills.  He denies any chest pain.  He 

denies any shortness of breath.  He has a chronic cough which is been present 

for a couple years and is unchanged.  He says that he frequently falls at home. 

He has had home health past but does not currently have the services set up.  He

uses a walker that has a seat.  He lives at home alone in [place].


Source:  patient


Exam Limitations:  no limitations


Date Seen


20


Time Seen by a Provider:  10:55


Attending Physician


Giulia Maya MD


PCP


Duke Johnston DO


Referring Physician





Date of Admission


May 29, 2020 at 05:00





Home Medications & Allergies


Home Medications


Reviewed patient Home Medication Reconciliation performed by pharmacy medication

reconciliations technician and/or nursing.


Patients Allergies have been reviewed.





Allergies





Allergies


Coded Allergies


  No Known Drug Allergies ([id number])








Past Medical-Social-Family Hx


Past Med/Social Hx:  Reviewed Nursing Past Med/Soc Hx, Reviewed and Corrections 

made


Patient Social History


Alcohol Use:  Regular Use ("AT LEAST 2 HIGHBALLS EVERY NIGHT" )


Number of Drinks Today:  0


Recreational Drug Use:  No


Smoking Status:  Former Smoker (QUIT )


Type Used:  Pipe


Recent Foreign Travel:  No


Contact w/other who traveled:  No


Recent Infectious Disease Expo:  No





Immunizations Up To Date


Tetanus Booster (TDap):  Less than 5yrs


Pediatric:  Yes


Date of Pneumonia Vaccine:  Oct 29, 2019





Past Medical History


Surgeries:  Orthopedic, Tonsillectomy


Cardiac:  Atrial Fibrillation, Hypertension


Genitourinary:  Prostate Problems


Musculoskeletal:  Chronic Back Pain


History of Blood Disorders:  No





Review of Systems


Constitutional:  weight loss


EENTM:  no symptoms reported


Respiratory:  cough


Cardiovascular:  no symptoms reported


Gastrointestinal:  nausea, vomiting


Genitourinary:  no symptoms reported


Musculoskeletal:  no symptoms reported


Skin:  no symptoms reported


Psychiatric/Neurological:  No Symptoms Reported





Physical Exam


Physical Exam


Vital Signs





Vital Signs - First Documented








 20





 02:50 10:16


 


Temp 35.8 


 


Pulse 98 


 


Resp 20 


 


B/P (MAP) 145/93 (110) 


 


Pulse Ox 95 


 


FiO2  21





Capillary Refill : Greater Than 3 Seconds


Height, Weight, BMI


Height: '"


Weight: lbs. oz. kg; 28.69 BMI


Method:


General Appearance:  No Apparent Distress, WD/WN


Neck:  Normal Inspection, Supple


Respiratory:  Lungs Clear, Normal Breath Sounds, No Respiratory Distress


Cardiovascular:  No Murmur, Irregularly Irregular (Normal rate)


Gastrointestinal:  Normal Bowel Sounds, Non Tender, Soft


Extremity:  Normal Inspection, Non Tender, No Pedal Edema


Neurologic/Psychiatric:  Alert, Oriented x3, No Motor/Sensory Deficits, Normal 

Mood/Affect


Skin:  Normal Color, Warm/Dry





Results


Results/Procedures


Labs


Laboratory Tests


20 02:50








Patient resulted labs reviewed.


Imaging:  Reviewed Imaging Report





Assessment/Plan


Admission Diagnosis


Nausea and vomiting


Admission Status:  Observation





Assessment and Plan


Nausea and vomiting


Weight loss


Chronic issue, vomiting 1-2 times daily for approximately 1 year


Reports intentional weight loss of 50 pounds over the past year


Antiemetics ordered


Consider evaluation with EGD as outpatient





Recent falls


Debility


PT/OT consulted


Plan to set up home health care on discharge





Concern for pneumonia


Chest x-ray without consolidation


Afebrile, normal white count


Procalcitonin normal


Discontinue antibiotics





Hypertension


Atrial fibrillation


BPH


Continue home meds





DVT prophylaxis: Lovenox





Diagnosis/Problems


Diagnosis/Problems





(1) Debility


Status:  Acute


(2) History of recent fall


Status:  Acute


(3) Nausea & vomiting


Status:  Acute





Clinical Quality Measures


DVT/VTE Risk/Contraindication:


Risk Factor Score Per Nursin


RFS Level Per Nursing on Admit:  4+=Very High











GIULIA MAYA MD              May 29, 2020 11:51

## 2020-05-29 NOTE — PHYSICAL THERAPY EVALUATION
PT Evaluation-General


Medical Diagnosis


Admission Date


May 29, 2020 at 05:00


Medical Diagnosis:  pneumonia/N&V/a-fib


Onset Date:  May 29, 2020





Therapy Diagnosis


Therapy Diagnosis:  debility





Precautions


Precautions/Isolations:  Droplet Isolation, Fall Prevention





Weight Bear Status


Right Lower Extremity:  Right


Weight Bearing/Tolerated


Left Lower Extremity:  Left


Weight Bearing/Tolerated





Referral


Physician:  Reji


Reason for Referral:  Evaluation/Treatment





Medical History


Pertinent Medical History:  Atrial Fib, Alcoholism, MI, Neuropathy


Current History


ER secondary to N&V x 2 days/[facility] [place] [facility] for respiratory infection and 

fall


Reviewed History:  Yes





Social History


Home:  Single Level


Current Living Status:  Alone





Prior


Prior Level of Function


SCALE: Activities may be completed with or without assistive devices.





6-Indepedent-patient completes the activity by him/herself with no assistance 

from a helper.


5-Set-up or Clean-up Assistance-helper sets up or cleans up; patient completes 

activity. Trimble assists only prior to or  


    following the activity.


4-Supervision or Touching Assistance-helper provides verbal cues and/or 

touching/steadying and/or contact guard assistance as patient completes 

activity. Assistance may be provided   


    throughout the activity or intermittently.


3-Partial/Moderate Assistance-helper does LESS THAN HALF the effort. Trimble 

lifts, holds or supports trunk or limbs, but provides less than half the effort.


2-Substantial/Maximal Assistance-helper does MORE THAN HALF the effort. Trimble 

lifts or holds trunk or limbs and provides more than half the effort.


0-Arrclmajr-fkghsm does ALL the effort. Patient does none of the effort to 

complete the activity. Or, the assistance of 2 or more helpers is required for 

the patient to complete the  


    activity.


If activity was not attempted, code reason:


7-Patient Refused.


9-Not Applicable-not attempted and the patient did not perform the activity 

before the current illness, exacerbation or injury.


10-Not Attempted due to Environmental Limitations-(lack of equipment, weather 

restraints, etc.).


88-Not Attempted due to Medical Conditions or Safety Concerns.


Bed Mobility:  6


Transfers (B,C,W/C):  6


Gait:  6


Stairs:  6


Indoor Mobility (Ambulation):  Independent


Stairs:  Independent


Prior Devices Use:  Walker





PT Evaluation-Current


Subjective


Patient agrees to PT.





Pain





   Numeric Pain Scale:  0-No Pain


   Location:  No Pain Reported





Objective


Patient Orientation:  Normal For Age


Attachments:  IV





ROM/Strength


ROM Lower Extremities


bilateral LE WFL


Strength Lower Extremities


4/5 grossly bilateral LE





Integumentary/Posture


Integumentary


refer to nursing notes


Bowel Incontinence:  No


Bladder Incontinence:  Yes


Posture


WFL





Neuromuscular


(Tone, Coordination, Reflexes)


grossly intact





Sensory


Vision:  Wears Glasses


Hearing:  Functional


Sensation Right Lower Extremit:  Impaired


Sensation Left Lower Extremity:  Impaired





Transfers


Roll Left to Right (QC):  6


Sit to Lying (QC):  6


Lying to Sitting/Side of Bed(Q:  6


Sit to Stand (QC):  6


Chair/Bed-to-Chair Xfer(QC):  6





Gait


Does the Patient Walk?:  Yes


Mode of Locomotion:  Walk


Anticipated Mode of Locomotion:  Walk


Walk 10 feet (QC):  5


Walk 50 ft with 2 Turns(QC):  5


Walk 150 ft (QC):  5


Distance:  300'


Gait Assistive Device:  FWW


Comments/Gait Description


steady,safe and functional





Wheelchair Training


Does the Pt Use a Wheelchair?:  No





Balance


Sitting Static:  Normal


Sitting Dynamic:  Normal


Standing Static:  Normal


 Standing Dynamic:  Normal





Assessment/Needs


75 y.o. male, will be seen short term by skilled PT to address functional 

mobility to ensure safe return to home or care facility at maximum LOF.


Rehab Potential:  Fair





PT Long Term Goals


Long Term Goals


PT Long Term Goals Time Frame:  Jun 6, 2020


Roll Left & Right (QC):  6


Sit to Lying (QC):  6


Lying-Sitting on Side/Bed(QC):  6


Sit to Stand (QC):  6


Chair/Bed-to-Chair Xfer(QC):  6


Toilet Transfer (QC):  6


Does the Patient Walk:  Yes


Walk 10 feet (QC):  6


Walk 50ft with 2 Turns (QC):  6


Walk 150 ft (QC):  6





PT Plan


Treatment/Plan


Treatment Plan:  Continue Plan of Care


Treatment Plan:  Education, Functional Activity Saad, Functional Strength, 

Gait, Safety, Therapeutic Exercise, Transfers


Treatment Duration:  Jun 6, 2020


Frequency:  6 times per week


Estimated Hrs Per Day:  .25 hour per day


Patient and/or Family Agrees t:  Yes





Time/GCodes


Time In:  1355


Time Out:  1412


Total Billed Treatment Time:  17


Total Billed Treatment


1 visit


EVModC 17 min











JAYNA SMALL PT              May 29, 2020 14:29

## 2020-05-29 NOTE — NUR
SPOKE WITH THE PT AND HE INDICATED I SHOULD CALL HIS NEPHEW PALOMA SINCE HE TAKES CARE OF 
HIS MEDICATION.

I CALLED PALOMA AND GOT A MED LIST FROM [facility] (APPEARS TO HAVE THE SAME INFORMATION THE 
EXT MED HISTORY DOES) TO COMPLETE THE MED REC



THE LAST TIME ALL OF HIS MEDICATIONS WERE FILLED WAS 02- ALL FOR 30 DAYS SUPPLIES. 
WHEN I ASKED PALOMA ABOUT THIS HE LET ME KNOW THE PT HAD BEEN AT [facility] FOR 
AROUND 100 DAYS RECENTLY AND BEFORE THAT HAD ALSO SPENT ANOTHER EXTENDED AMOUNT OF TIME 
THERE. PALOMA SAID WHEN HE LEFT [place] [facility] THEY GAVE HIM A STOCK OF MEDICATIONS AND 
BETWEEN THAT AND WHAT [facility] HAD FILLED THE PT HAS HAD MEDICATION TO LAST TIME, BUT HE DID 
INDICATE MEDS WERE RUNNING LOW AND WHENEVER THE PT WAS DISCHARGED HE WAS GOING TO REORDER 
MEDS IF HE NEEDS TO



OTC MEDS:

FISH OIL

[facility]

## 2020-05-29 NOTE — XMS REPORT
[facility]

                             Created on: 2019



Duke Mandujano

External Reference #: [id number]

: 1945

Sex: Male



Demographics





                          Address                   [address] [place]  [address]

 

                          Preferred Language        Unknown

 

                          Marital Status            Unknown

 

                          Zoroastrianism Affiliation     Unknown

 

                          Race                      Unknown

 

                          Ethnic Group              Unknown





Author





                          Author                    ROSSY Duke SQUIRES

 

                          [facility]

 

                          Address                   [address] [place]  [address]



 

                          Phone                     [phone]







Care Team Providers





                    Care Team Member Name Role                Phone

 

                    MARCELINA BLAIRLY    Unavailable         [phone]







PROBLEMS





          Type      Condition ICD9-CM Code SUP14-RI Code Onset Dates Condition S

tatus SNOMED 

Code

 

           Problem    Chronic bilateral low back pain without sciatica          

  M54.5      08 Sep, 2017 

Active                                  [id number]

 

          Problem   Other abnormal glucose           R73.09              Active 

   [id number]

 

          Problem   Hyperlipidemia           E78.5               Active    56345

004

 

          Problem   Chronic fatigue           R53.82              Active    5270



 

          Problem   History of fusion of lumbar spine           Z98.1     28 Sep

, 2018 Active    [id number]

 

          Problem   Thrombocytopenia, primary           D69.49     A

ctive    [id number]

 

          Problem   Chronic a-fib           I48.2               Active    925072

04

 

          Problem   Essential hypertension           I10                 Active 

   [id number]







ALLERGIES





             Substance    Reaction     Event Type   Date         Status

 

             Amoxicillin  rash         Drug Allergy 13 Mar, 2019 Active







ENCOUNTERS





                Encounter       Location        Date            Diagnosis

 

                [address] [address]     Other abnormal

glucose R73.09 ; Chronic a-fib I48.2 ; Essential hypertension I10 ; 
Hyperlipidemia E78.5 and History of fusion of lumbar spine Z98.1

 

                          [facility]     [address] [facility] [id number]

[address] [place] [address]

                                        Chronic a-fib I48.2

 

                [facility]     [address] [place] [address]     Viral 

gastroenteritis A08.4 and Chronic fatigue R53.82

 

                [address] [address] 22 May,

 2019     

 

                [place] [address] [place] [address] 13 May,

 2019     

 

                [address] [address] 09 May,

 2019    Generalized 

weakness R53.1 and Toenail fungus B35.1

 

                [facility]     [address] [place] [address]     Dark urine 

R82.998 ; Chronic bilateral low back pain without sciatica M54.5 ; Essential 
hypertension I10 and Chronic fatigue R53.82

 

                          [facility]     [address] [id number]

[address] [place] [address]

                          26 Mar, 2019               

 

                [facility]     [address] [place] [address] 13 Mar,

 2019    Generalized 

weakness R53.1 and Fall, initial encounter W19.XXXA

 

                [facility]     [address] [place] [address] 13 Mar,

 2019     

 

                [facility]     [address] [place] [address] 08 Mar,

 2019    Follow-up exam

Z09 and Generalized weakness R53.1

 

                [facility]     [address] [place] [address] 01 Mar,

 2019     

 

                [facility]     [address] [place] [address] 01 Mar,

 2019     

 

                [facility]     [address] [place] [address]      

 

                [facility]     [address] [place] [address]      

 

                          [facility]     [address] [id number]

[address] [place] [address]

                                         

 

                          [facility]     [address] [id number]

[address] [place] [address]

                          30 Dec, 2018               

 

                          [facility]     [address] [id number]

[address] [place] [address]

                                         

 

                          [facility]     [address] [facility] [id number]

[address] [place] [address]

                          21 Sep, 2018               







IMMUNIZATIONS

No Known Immunizations



SOCIAL HISTORY

Never Assessed



REASON FOR VISIT

Home visit



PLAN OF CARE





                          Activity                  Details

 

                                         

 

                          Follow Up                 prn Reason:







VITAL SIGNS





                    Height              70 in               2019

 

                    Heart Rate          85 bpm              2019

 

                    Oximetry            97 %                2019

 

                    Blood pressure systolic 120 mmHg            2019

 

                    Blood pressure diastolic 82 mmHg             2019







MEDICATIONS





        Medication Instructions Dosage  Frequency Start Date End Date Duration S

tatus

 

        Metoprolol Tartrate 50 MG Orally Twice a day 1 tablet with food 12h     

                30 days 

Active

 

        Cardura 4 MG Orally Once a day 1 tablet 24h     01 Mar, 2019         30 

day(s) Active

 

        Digoxin 125 MCG Orally Once a day 1 tablet 24h                     30 da

ys Active

 

        Clopidogrel Bisulfate 75 MG Orally Once a day 1 tablet 24h              

       30 day(s) Active

 

        Aspirin Adult Low Dose 81 MG Orally Once a day 1 tablet 24h             

        30 days Active

 

        Verapamil HCl 80 MG Orally 2 times a day 1/2 tablet 12h                 

    30 days Active







RESULTS

No Results



PROCEDURES





                Procedure       Date Ordered    Result          Body Site

 

                [facility] VISIT ESTABLISHED PATIENT 2019                   







INSTRUCTIONS





MEDICATIONS ADMINISTERED

No Known Medications



MEDICAL (GENERAL) HISTORY





                    Type                Description         Date

 

                    Medical History     Chronic a-fib        

 

                    Medical History     Essential hypertension  

 

                    Medical History     Situational depression  

 

                    Medical History     Hyperlipidemia       

 

                          Medical History           Chronic low back pain withou

t sciatica, unspecified back pain 

laterality                               

 

                    Medical History     Thrombocytopenia     

 

                    Surgical History    tonsillectomy        

 

                    Surgical History    lumbar fusion

## 2020-05-29 NOTE — OCC THERAPY PROGRESS NOTE
Therapy Progress Note


OT orders received and chart reviewed. Pt is currently pending COVID-19 testing.

OT will continue to monitor pt and initiate tx pending negative COVID test.











DANIEL KENDALL OT          May 29, 2020 10:21

## 2020-05-30 VITALS — DIASTOLIC BLOOD PRESSURE: 86 MMHG | SYSTOLIC BLOOD PRESSURE: 129 MMHG

## 2020-05-30 VITALS — SYSTOLIC BLOOD PRESSURE: 126 MMHG | DIASTOLIC BLOOD PRESSURE: 81 MMHG

## 2020-05-30 VITALS — DIASTOLIC BLOOD PRESSURE: 93 MMHG | SYSTOLIC BLOOD PRESSURE: 142 MMHG

## 2020-05-30 VITALS — DIASTOLIC BLOOD PRESSURE: 94 MMHG | SYSTOLIC BLOOD PRESSURE: 145 MMHG

## 2020-05-30 LAB
ALBUMIN SERPL-MCNC: 3.4 GM/DL (ref 3.2–4.5)
ALP SERPL-CCNC: 67 U/L (ref 40–136)
ALT SERPL-CCNC: 10 U/L (ref 0–55)
BASOPHILS # BLD AUTO: 0 10^3/UL (ref 0–0.1)
BASOPHILS NFR BLD AUTO: 1 % (ref 0–10)
BILIRUB SERPL-MCNC: 0.6 MG/DL (ref 0.1–1)
BUN/CREAT SERPL: 5
CALCIUM SERPL-MCNC: 8.9 MG/DL (ref 8.5–10.1)
CHLORIDE SERPL-SCNC: 107 MMOL/L (ref 98–107)
CO2 SERPL-SCNC: 22 MMOL/L (ref 21–32)
CREAT SERPL-MCNC: 0.92 MG/DL (ref 0.6–1.3)
EOSINOPHIL # BLD AUTO: 0.1 10^3/UL (ref 0–0.3)
EOSINOPHIL NFR BLD AUTO: 2 % (ref 0–10)
ERYTHROCYTE [DISTWIDTH] IN BLOOD BY AUTOMATED COUNT: 14.8 % (ref 10–14.5)
GFR SERPLBLD BASED ON 1.73 SQ M-ARVRAT: > 60 ML/MIN
GLUCOSE SERPL-MCNC: 138 MG/DL (ref 70–105)
HCT VFR BLD CALC: 43 % (ref 40–54)
HGB BLD-MCNC: 14.1 G/DL (ref 13.3–17.7)
LYMPHOCYTES # BLD AUTO: 1.5 X 10^3 (ref 1–4)
LYMPHOCYTES NFR BLD AUTO: 32 % (ref 12–44)
MANUAL DIFFERENTIAL PERFORMED BLD QL: NO
MCH RBC QN AUTO: 29 PG (ref 25–34)
MCHC RBC AUTO-ENTMCNC: 33 G/DL (ref 32–36)
MCV RBC AUTO: 90 FL (ref 80–99)
MONOCYTES # BLD AUTO: 0.7 X 10^3 (ref 0–1)
MONOCYTES NFR BLD AUTO: 15 % (ref 0–12)
NEUTROPHILS # BLD AUTO: 2.3 X 10^3 (ref 1.8–7.8)
NEUTROPHILS NFR BLD AUTO: 51 % (ref 42–75)
PLATELET # BLD: 197 10^3/UL (ref 130–400)
PMV BLD AUTO: 9.1 FL (ref 7.4–10.4)
POTASSIUM SERPL-SCNC: 4.1 MMOL/L (ref 3.6–5)
PROT SERPL-MCNC: 6.9 GM/DL (ref 6.4–8.2)
SODIUM SERPL-SCNC: 141 MMOL/L (ref 135–145)
WBC # BLD AUTO: 4.5 10^3/UL (ref 4.3–11)

## 2020-05-30 RX ADMIN — APIXABAN SCH MG: 5 TABLET, FILM COATED ORAL at 09:32

## 2020-05-30 RX ADMIN — ALBUTEROL SULFATE SCH PUFF: 90 AEROSOL, METERED RESPIRATORY (INHALATION) at 06:42

## 2020-05-30 RX ADMIN — GABAPENTIN SCH MG: 300 CAPSULE ORAL at 09:33

## 2020-05-30 RX ADMIN — DOCUSATE SODIUM SCH MG: 100 CAPSULE ORAL at 09:33

## 2020-05-30 RX ADMIN — INSULIN ASPART SCH UNIT: 100 INJECTION, SOLUTION INTRAVENOUS; SUBCUTANEOUS at 06:06

## 2020-05-30 RX ADMIN — INSULIN ASPART SCH UNIT: 100 INJECTION, SOLUTION INTRAVENOUS; SUBCUTANEOUS at 12:00

## 2020-05-30 RX ADMIN — METOPROLOL TARTRATE SCH MG: 50 TABLET, FILM COATED ORAL at 09:33

## 2020-05-30 RX ADMIN — DEXTROSE MONOHYDRATE, SODIUM CHLORIDE, AND POTASSIUM CHLORIDE SCH MLS/HR: 50; 4.5; 1.49 INJECTION, SOLUTION INTRAVENOUS at 00:11

## 2020-05-30 RX ADMIN — ALBUTEROL SULFATE SCH PUFF: 90 AEROSOL, METERED RESPIRATORY (INHALATION) at 03:50

## 2020-05-30 RX ADMIN — SENNOSIDES SCH MG: 8.6 TABLET, FILM COATED ORAL at 09:32

## 2020-05-30 NOTE — PHYSICAL THERAPY DAILY NOTE
PT Daily Note-Current


Subjective


Pt pleasant and agreeable to ambulation.





Mental Status


Patient Orientation:  Confused


Attachments:  IV





Transfers


SCALE: Activities may be completed with or without assistive devices.





6-Indepedent-patient completes the activity by him/herself with no assistance 

from a helper.


5-Set-up or Clean-up Assistance-helper sets up or cleans up; patient completes a

ctivity. [place] assists only prior to or  


    following the activity.


4-Supervision or Touching Assistance-helper provides verbal cues and/or 

touching/steadying and/or contact guard assistance as patient completes 

activity. Assistance may be provided   


    throughout the activity or intermittently.


3-Partial/Moderate Assistance-helper does LESS THAN HALF the effort. [place] 

lifts, holds or supports trunk or limbs, but provides less than half the effort.


2-Substantial/Maximal Assistance-helper does MORE THAN HALF the effort. [place] 

lifts or holds trunk or limbs and provides more than half the effort.


3-Ruflawcop-aweklb does ALL the effort. Patient does none of the effort to 

complete the activity. Or, the assistance of 2 or more helpers is required for 

the patient to complete the  


    activity.


If activity was not attempted, code reason:


7-Patient Refused.


9-Not Applicable-not attempted and the patient did not perform the activity 

before the current illness, exacerbation or injury.


10-Not Attempted due to Environmental Limitations-(lack of equipment, weather 

restraints, etc.).


88-Not Attempted due to Medical Conditions or Safety Concerns.


Mod I with all bed mobility





Weight Bearing


Right Lower Extremity:  Right


Weight Bearing/Tolerated


Left Lower Extremity:  Left


Weight Bearing/Tolerated





Gait Training


Gait Assistive Device:  FWW


Ambulate 500ft using FWW and CGA.  Verbal cues for safety and walker use while 

in congested areas.





Assessment


Pt progressing with activity tolerance.  He was winded post gait.  Pt will 

benefit from continued PT to increase strength for DC to home.





PT Long Term Goals


Long Term Goals


PT Long Term Goals Time Frame:  Jun 6, 2020


Roll Left & Right (QC):  6


Sit to Lying (QC):  6


Lying-Sitting on Side/Bed(QC):  6


Sit to Stand (QC):  6


Chair/Bed-to-Chair Xfer(QC):  6


Toilet Transfer (QC):  6


Does the Patient Walk:  Yes


Walk 10 feet (QC):  6


Walk 50ft with 2 Turns (QC):  6


Walk 150 ft (QC):  6





PT Plan


Problem List


Problem List:  Activity Tolerance, Gait





Treatment/Plan


Treatment Plan:  Continue Plan of Care


Treatment Plan:  Education, Functional Activity Saad, Functional Strength, 

Gait, Safety, Therapeutic Exercise, Transfers


Treatment Duration:  Jun 6, 2020


Frequency:  6 times per week


Estimated Hrs Per Day:  .25 hour per day


Patient and/or Family Agrees t:  Yes





Time/GCodes


Time In:  0810


Time Out:  0825


Total Billed Treatment Time:  15


Total Billed Treatment


visit, gait 15 min











WILLIAM NICHOLE PT                May 30, 2020 08:29

## 2020-05-30 NOTE — DISCHARGE SUMMARY
Discharge Summary


Reconcile Patient Problems


Problems Reviewed?:  Yes





Instructions for Patient


[facility], [phone]


Assessment/Instructions


take medications as prescribed.  Follow up with Dr. Beck in about 3 weeks.


Physician to follow Patient:  Preston


Discharge Diet for Home:  Cardiac Diet


Hospital Course


Date of Admission: May 29, 2020 at 05:00 


Admission Diagnosis :  Nausea and vomiting





Family Physician/Provider: Duke Johnston DO  





Date of Discharge: 5/30/20 


Discharge Diagnosis:  Nausea and vomiting, weight loss, debility








Hospital Course:


Duke Mandujano is a 75-year-old male who was admitted after having recent falls 

at home.  He reports that he was feeling unstable.  He uses a walker with a 

chair.  He has had issues with episodes of nausea and vomiting weekly for the 

past year.  He reports weight loss of about 50 pounds over the past year which 

she attributes to diet and exercise.  Nevertheless, it may be worth an 

endoscopic evaluation for further assessment as an outpatient.  He had no 

episodes of nausea or vomiting during his hospitalization.  He has a history of 

chronic atrial fibrillation and previously followed with Dr. Palma in [place].

 He says that he would like to establish with someone a little closer to home.  

We made a follow-up appointment for him with Dr. Beck in about 3 weeks.  He was

set up with home health care for ongoing therapies and nursing care in his home.

 He should follow-up with Dr. Johnston in about a week.














Labs and Pending Lab Test:


Laboratory Tests


5/29/20 11:15: Glucometer 171H


5/29/20 15:34: Glucometer 152H


5/29/20 21:17: Glucometer 145H


5/30/20 05:57: Glucometer 134H


5/30/20 06:31: 


White Blood Count 4.5, Red Blood Count 4.81, Hemoglobin 14.1, Hematocrit 43, 

Mean Corpuscular Volume 90, Mean Corpuscular Hemoglobin 29, Mean Corpuscular 

Hemoglobin Concent 33, Red Cell Distribution Width 14.8H, Platelet Count 197, 

Mean Platelet Volume 9.1, Neutrophils (%) (Auto) 51, Lymphocytes (%) (Auto) 32, 

Monocytes (%) (Auto) 15H, Eosinophils (%) (Auto) 2, Basophils (%) (Auto) 1, 

Neutrophils # (Auto) 2.3, Lymphocytes # (Auto) 1.5, Monocytes # (Auto) 0.7, 

Eosinophils # (Auto) 0.1, Basophils # (Auto) 0.0, Sodium Level 141, Potassium 

Level 4.1, Chloride Level 107, Carbon Dioxide Level 22, Anion Gap 12, Blood Urea

Nitrogen 5L, Creatinine 0.92, Estimat Glomerular Filtration Rate > 60, 

BUN/Creatinine Ratio 5, Glucose Level 138H, Calcium Level 8.9, Corrected Calcium

9.4, Total Bilirubin 0.6, Aspartate Amino Transf (AST/SGOT) 22, Alanine 

Aminotransferase (ALT/SGPT) 10, Alkaline Phosphatase 67, Total Protein 6.9, 

Albumin 3.4, Procalcitonin 0.03





Home Meds


Active


Reported


Folic Acid 1 Mg Tablet 1 Mg PO DAILY


Multivitamin 1 Each Tablet 1 Each PO DAILY


Fish Oil 1,000 mg Softgel (Omega-3/Dha/Epa/Fish Oil) 1 Each Capsule 1 Each PO 

DAILY


Flomax (Tamsulosin HCl) 0.4 Mg Cap 0.4 Mg PO DAILY


Neurontin (Gabapentin) 300 Mg Capsule 300 Mg PO TID


Eliquis (Apixaban) 5 Mg Tablet 5 Mg PO 1200,1800


     TAKES TWICE DAILY @ 1200&1800


Venlafaxine HCl ER (Venlafaxine HCl) 75 Mg Cap.er.24h 150 Mg PO DAILY


Finasteride 5 Mg Tablet 5 Mg PO 1200


Amlodipine Besylate 5 Mg Tablet 5 Mg PO DAILY


Metoprolol Tartrate 50 Mg Tablet 50 Mg PO 1200,1800


     TAKES TWICE DAILY @1200&1800


Verapamil HCl 80 Mg Tablet 80 Mg PO 1200,1800


     TAKES TWICE DAILY @1200&1800


Digoxin 125 Mcg Tablet 125 Mcg PO DAILY


Cefdinir 300 Mg Capsule 300 Mg PO BID 10 Days


     FILLED AND PICKED UP ON 05- #20/10 DAY SUPPLY- HAS NOT BEEN


     STARTED


Patient Allergies:  


Coded Allergies:  


     No Known Drug Allergies (Unverified , 5/29/20)





Home Health Need/Face to Face


Date of Face to Face:  May 30, 2020


Clinical Findings:  Instability, Muscle weakness, Unsteady gait


I have seen Pt face-to-face:  Yes


Discharged To:  Home


Diagnosis/Conditions:  


debility, recent falls


Problems/Diagnosis/Condition:  


(1) Debility


(2) Nausea & vomiting


(3) History of recent fall


Patient is Homebound due to:  Juan R fall risk due to instabilty, Muscle weakness


Homebound Status


   Due to the above stated illness, injury or surgical procedure (medical 

condition or diagnosis) and associated clinical findings, the patient is 

homebound because of his/her inability to leave home except with aid of a 

supportive device and/or person AND leaving the home requires a considerable and

taxing effort or is medically contraindicated.


Pt req the following assistanc:  Aid of another person, Walker





Home Health Nursing Orders


Home Health Services Order:  Nursing Services, Occupational Ther-Evaluate & 

Treat, Physical Therapy-Evaluate & Treat





Home Health Infusion Therapy


Line Start Date:  May 29, 2020





Therapy Orders


Therapy Orders:  OT (must have SN or PT order), Physical Therapy


Therapy Specific Orders:  Eval assistive deivces, Teach enviro 

modifications/safety, Gait training, Increase strength/endurance


Certify Stmt


I certify that this patient is under my care and that I, a nurse practitioner or

a physician; a assistant working with me, had a face to face encounter that -

meets the physician face to face encounter requirements with this patient as 

dated.





Discharge Physical Exam


General:  Alert, Oriented X3, Cooperative, No Acute Distress


HEENT:  Atraumatic, PERRLA, EOMI, Mucous Memb Moist/Pink


Lungs:  Clear to Auscultation, Normal Air Movement


Heart:  Regular Rate (irregularly irregular rhythm)


Abdomen:  Normal Bowel Sounds, Soft, No Tenderness


Extremities:  No Edema, No Tenderness/Swelling


Skin:  No Rashes, No Significant Lesion


Neuro:  Normal Speech


Psych/Mental Status:  Mental Status NL, Mood NL











VASILE MAYA MD              May 30, 2020 09:39

## 2020-05-30 NOTE — CONSULTATION-CARDIOLOGY
HPI-Cardiology


Cardiology Consultation


Date of Consultation


20


Date of Admission





Time Seen by Provider:  11:33


Indication:  atrial fibrillation





HPI


75 years old gentleman with history of atrial fibrillation, hypertension, has 

been having nausea and vomiting, admitted with questionable left lower lobe 

infiltrate.  Currently feeling better still having significant dry cough.  No 

chest pain.  No palpitation, reported that he had history of paroxysmal atrial 

fibrillation.  Has been compliant with medication taking oral anticoagulation.  

On my evaluation patient expressed that he is ready to go home, denied any 

active pain but still having significant cough nonproductive that has been 

chronic





Home Medications & Allergies


Allergies:  


Coded Allergies:  


     No Known Drug Allergies (Unverified , 20)


Home Medication List Reviewed:  Yes





PMH-Social-Family Hx


Patient Social History


Marital Status:  


Employed/Student:  retired


Alcohol Use:  Regular Use ("AT LEAST 2 HIGHBALLS EVERY NIGHT" )


Recreational Drug Use:  No


Smoking Status:  Former Smoker (QUIT )


Type Used:  Pipe


Recent Foreign Travel:  No


Recent Infectious Disease Expo:  No





Immunizations Up To Date


Tetanus Booster (TDap):  Less than 5yrs


Date of Pneumonia Vaccine:  Oct 29, 2019





Past Medical History


Discussed below





Family Medical History


Family Medical Hx


Noncontributory





Review of Systems-General


Review of Systems


Constitutional:  see HPI, malaise, weight loss


EENTM:  see HPI, no symptoms reported


Respiratory:  see HPI, cough; No dyspnea on exertion, No hemoptysis, No 

orthopnea, No phlegm, No short of breath, No stridor, No wheezing, No other


Cardiovascular:  see HPI; No chest pain, No edema, No Hx of Intervention, No 

palpitations, No syncope, No vascular heart diseas, No other


Gastrointestinal:  see HPI, nausea, vomiting


Genitourinary:  no symptoms reported, see HPI


Musculoskeletal:  no symptoms reported, see HPI


Skin:  no symptoms reported, see HPI


Psychiatric/Neurological:  No Symptoms Reported, See HPI





Reviewed Test Results


Reviewed Test Results


Lab





Laboratory Tests








Test


 20


15:34 20


21:17 20


05:57 20


06:31 Range/Units


 


 


Glucometer 152 H 145 H 134 H    MG/DL


 


White Blood Count


 


 


 


 4.5 


 4.3-11.0


10^3/uL


 


Red Blood Count


 


 


 


 4.81 


 4.35-5.85


10^6/uL


 


Hemoglobin    14.1  13.3-17.7  G/DL


 


Hematocrit    43  40-54  %


 


Mean Corpuscular Volume    90  80-99  FL


 


Mean Corpuscular Hemoglobin    29  25-34  PG


 


Mean Corpuscular Hemoglobin


Concent 


 


 


 33 


 32-36  G/DL





 


Red Cell Distribution Width    14.8 H 10.0-14.5  %


 


Platelet Count


 


 


 


 197 


 130-400


10^3/uL


 


Mean Platelet Volume    9.1  7.4-10.4  FL


 


Neutrophils (%) (Auto)    51  42-75  %


 


Lymphocytes (%) (Auto)    32  12-44  %


 


Monocytes (%) (Auto)    15 H 0-12  %


 


Eosinophils (%) (Auto)    2  0-10  %


 


Basophils (%) (Auto)    1  0-10  %


 


Neutrophils # (Auto)    2.3  1.8-7.8  X 10^3


 


Lymphocytes # (Auto)    1.5  1.0-4.0  X 10^3


 


Monocytes # (Auto)    0.7  0.0-1.0  X 10^3


 


Eosinophils # (Auto)


 


 


 


 0.1 


 0.0-0.3


10^3/uL


 


Basophils # (Auto)


 


 


 


 0.0 


 0.0-0.1


10^3/uL


 


Sodium Level    141  135-145  MMOL/L


 


Potassium Level    4.1  3.6-5.0  MMOL/L


 


Chloride Level    107    MMOL/L


 


Carbon Dioxide Level    22  21-32  MMOL/L


 


Anion Gap    12  5-14  MMOL/L


 


Blood Urea Nitrogen    5 L 7-18  MG/DL


 


Creatinine


 


 


 


 0.92 


 0.60-1.30


MG/DL


 


Estimat Glomerular Filtration


Rate 


 


 


 > 60 


  





 


BUN/Creatinine Ratio    5   


 


Glucose Level    138 H   MG/DL


 


Calcium Level    8.9  8.5-10.1  MG/DL


 


Corrected Calcium    9.4  8.5-10.1  MG/DL


 


Total Bilirubin    0.6  0.1-1.0  MG/DL


 


Aspartate Amino Transf


(AST/SGOT) 


 


 


 22 


 5-34  U/L





 


Alanine Aminotransferase


(ALT/SGPT) 


 


 


 10 


 0-55  U/L





 


Alkaline Phosphatase    67    U/L


 


Total Protein    6.9  6.4-8.2  GM/DL


 


Albumin    3.4  3.2-4.5  GM/DL


 


Procalcitonin    0.03  <0.10  NG/ML











Physical Exam


Physical Exam


Vital Signs





Vital Signs - First Documented








 20





 02:50 10:16


 


Temp 35.8 


 


Pulse 98 


 


Resp 20 


 


B/P (MAP) 145/93 (110) 


 


Pulse Ox 95 


 


FiO2  21





Capillary Refill : Greater Than 3 Seconds


Height, Weight, BMI


Height: '"


Weight: lbs. oz. kg; 28.69 BMI


Method:


General Appearance:  No Apparent Distress, WD/WN


Neck:  Normal Inspection, Supple


Respiratory:  Lungs Clear, Normal Breath Sounds, No Respiratory Distress


Cardiovascular:  No Murmur, Irregularly Irregular (Normal rate)


Gastrointestinal:  Normal Bowel Sounds, Non Tender, Soft


Extremity:  Normal Inspection, Non Tender, No Pedal Edema


Neurologic/Psychiatric:  Alert, Oriented x3, No Motor/Sensory Deficits, Normal 

Mood/Affect


Skin:  Normal Color, Warm/Dry





A/P-Cardiology


Admission Diagnosis


Nausea and vomiting


Paroxysmal atrial fibrillation


Cough and dyspnea


Debility





Assessment/Plan


Nausea and vomiting, reporting improvement at this time, feeling better.  

Managed by primary care physician





Paroxysmal atrial fibrillation, appear to be currently in atrial fibrillation 

with a controlled rate, maintained on metoprolol, verapamil and digoxin.  Heart 

rate is well-controlled.  Continue on current medication monitor and continue on

oral anticoagulation to reduce the risk of stroke, planning to evaluate 2-D 

echocardiogram





Questionable coronary artery disease, patient did not have any symptoms, having 

arrhythmia, last workup was done about 3 years ago in [place], recommend 

evaluating stress test as an outpatient





Chronic cough, reporting persistent cough for the past year.  Could be reactive 

airway disease I will evaluate his response to bronchodilator.





Hypertension, controlled on current medication, continue to monitor





Generalized weakness and debility.  Managed by primary care team





Clinical Quality Measures


DVT/VTE Risk/Contraindication:


Risk Factor Score Per Nursin


RFS Level Per Nursing on Admit:  4+=Very High











NIC COBIAN MD             May 30, 2020 11:38 am

## 2020-05-30 NOTE — NUR
DISCHARGE INSTRUCTION GIVEN, VERBALIZED UNDERSTANDING, DISCHARGE INSTRUCTIONS FAXED TO HOME 
HEALTH TELEMETRY DC, IV DC, SITE WITHOUT REDNESS OR SWELLING, DENIES PAIN OR SOB

## 2020-06-02 NOTE — PHYSICIAN QUERY CLARIFICATION
ASHIA JONES 6/2/20 2151:


PQD17 Principal Diagnosis


Principal Diagnosis


Document Diagnosis


QUESTION: Please specify the condition(s) that was chiefly responsible for oc

casioning the admission to the hospital after study/evaluation based on your 

medical judgment.





 Note


 Note

















Please remember a lack of response to the above will prompt a phone page by 

[facility]/coding staff.





In responding to this query, please exercise your independent professional 

judgment.  The purpose of this communication is to more accurately reflect the 

complexity of your patients condition. The fact that a question is asked does 

not imply that any particular answer is desired or expected.  





Thank you for your timely response to this clarification.      


   


Requestors name: Ashia   





Phone # [phone]








THIS PHYSICIAN QUERY FORM IS A PERMANENT PART OF THE MEDICAL RECORD





VASILE MAYA MD 6/9/20 1632:


PQD17 Principal Diagnosis


Question


Chief Reason for Admission Aft:  


Intractable nausea and vomiting











ASHIA JONES                 Jun 2, 2020 21:51


VASILE MAYA MD               Jun 9, 2020 16:32

## 2020-07-29 ENCOUNTER — HOSPITAL ENCOUNTER (EMERGENCY)
Dept: HOSPITAL 75 - ER | Age: 75
Discharge: HOME | End: 2020-07-29
Payer: MEDICARE

## 2020-07-29 VITALS — HEIGHT: 69.69 IN | BODY MASS INDEX: 28.95 KG/M2 | WEIGHT: 199.96 LBS

## 2020-07-29 VITALS — SYSTOLIC BLOOD PRESSURE: 136 MMHG | DIASTOLIC BLOOD PRESSURE: 107 MMHG

## 2020-07-29 DIAGNOSIS — I10: ICD-10-CM

## 2020-07-29 DIAGNOSIS — I48.91: ICD-10-CM

## 2020-07-29 DIAGNOSIS — Z79.01: ICD-10-CM

## 2020-07-29 DIAGNOSIS — K21.9: Primary | ICD-10-CM

## 2020-07-29 LAB
ALBUMIN SERPL-MCNC: 4 GM/DL (ref 3.2–4.5)
ALP SERPL-CCNC: 75 U/L (ref 40–136)
ALT SERPL-CCNC: 14 U/L (ref 0–55)
APTT PPP: (no result) S
BACTERIA #/AREA URNS HPF: NEGATIVE /HPF
BASOPHILS # BLD AUTO: 0 10^3/UL (ref 0–0.1)
BASOPHILS NFR BLD AUTO: 0 % (ref 0–10)
BASOPHILS NFR BLD MANUAL: 1 %
BILIRUB SERPL-MCNC: 1.7 MG/DL (ref 0.1–1)
BILIRUB UR QL STRIP: NEGATIVE
BUN/CREAT SERPL: 6
CALCIUM SERPL-MCNC: 8.4 MG/DL (ref 8.5–10.1)
CHLORIDE SERPL-SCNC: 102 MMOL/L (ref 98–107)
CO2 SERPL-SCNC: 26 MMOL/L (ref 21–32)
CREAT SERPL-MCNC: 0.93 MG/DL (ref 0.6–1.3)
EOSINOPHIL # BLD AUTO: 0 10^3/UL (ref 0–0.3)
EOSINOPHIL NFR BLD AUTO: 0 % (ref 0–10)
ERYTHROCYTE [DISTWIDTH] IN BLOOD BY AUTOMATED COUNT: 14.4 % (ref 10–14.5)
FIBRINOGEN PPP-MCNC: CLEAR MG/DL
GFR SERPLBLD BASED ON 1.73 SQ M-ARVRAT: > 60 ML/MIN
GLUCOSE SERPL-MCNC: 150 MG/DL (ref 70–105)
GLUCOSE UR STRIP-MCNC: NEGATIVE MG/DL
HCT VFR BLD CALC: 44 % (ref 40–54)
HGB BLD-MCNC: 15.7 G/DL (ref 13.3–17.7)
HYALINE CASTS #/AREA URNS LPF: (no result) /LPF
KETONES UR QL STRIP: NEGATIVE
LEUKOCYTE ESTERASE UR QL STRIP: NEGATIVE
LIPASE SERPL-CCNC: 15 U/L (ref 8–78)
LYMPHOCYTES # BLD AUTO: 0.9 X 10^3 (ref 1–4)
LYMPHOCYTES NFR BLD AUTO: 14 % (ref 12–44)
MANUAL DIFFERENTIAL PERFORMED BLD QL: YES
MCH RBC QN AUTO: 31 PG (ref 25–34)
MCHC RBC AUTO-ENTMCNC: 36 G/DL (ref 32–36)
MCV RBC AUTO: 86 FL (ref 80–99)
MONOCYTES # BLD AUTO: 1.1 X 10^3 (ref 0–1)
MONOCYTES NFR BLD AUTO: 19 % (ref 0–12)
MONOCYTES NFR BLD: 14 %
NEUTROPHILS # BLD AUTO: 3.9 X 10^3 (ref 1.8–7.8)
NEUTROPHILS NFR BLD AUTO: 66 % (ref 42–75)
NEUTS BAND NFR BLD MANUAL: 71 %
NITRITE UR QL STRIP: NEGATIVE
PH UR STRIP: 8 [PH] (ref 5–9)
PLATELET # BLD: 138 10^3/UL (ref 130–400)
PMV BLD AUTO: 9.7 FL (ref 7.4–10.4)
POTASSIUM SERPL-SCNC: 3.8 MMOL/L (ref 3.6–5)
PROT SERPL-MCNC: 7.2 GM/DL (ref 6.4–8.2)
PROT UR QL STRIP: (no result)
RBC #/AREA URNS HPF: (no result) /HPF
RBC MORPH BLD: NORMAL
SODIUM SERPL-SCNC: 141 MMOL/L (ref 135–145)
SP GR UR STRIP: 1.02 (ref 1.02–1.02)
VARIANT LYMPHS NFR BLD MANUAL: 14 %
WBC # BLD AUTO: 5.9 10^3/UL (ref 4.3–11)
WBC #/AREA URNS HPF: (no result) /HPF

## 2020-07-29 PROCEDURE — 85007 BL SMEAR W/DIFF WBC COUNT: CPT

## 2020-07-29 PROCEDURE — 83690 ASSAY OF LIPASE: CPT

## 2020-07-29 PROCEDURE — 36415 COLL VENOUS BLD VENIPUNCTURE: CPT

## 2020-07-29 PROCEDURE — 85027 COMPLETE CBC AUTOMATED: CPT

## 2020-07-29 PROCEDURE — 81000 URINALYSIS NONAUTO W/SCOPE: CPT

## 2020-07-29 PROCEDURE — 80053 COMPREHEN METABOLIC PANEL: CPT

## 2020-07-29 PROCEDURE — 74022 RADEX COMPL AQT ABD SERIES: CPT

## 2020-07-29 NOTE — NUR
TALKED WITH PT'S NEPHEW WHO STATES HE WILL NOT BE ABLE TO COME AND GET HIME 
TILL AFTER 5PM TONIGHT.  [facility] CALLED FOR THE PT.  PT INSTRUCTED HE NEEDS TO TAKE 
HIS BP MED THAT HE DID NOT TAKE THIS AM

## 2020-07-29 NOTE — XMS REPORT
Continuity of Care Document

                             Created on: 2020



DUKE MONSALVE

External Reference #: [id number]

: 1945

Sex: Male



Demographics





                          Address                   [address] [place]  [address]

 

                          Home Phone                [phone] x

 

                          Preferred Language        Unknown

 

                          Marital Status            Unknown

 

                          Latter day Affiliation     Unknown

 

                          Race                      Unknown

 

                          Ethnic Group              Unknown





Author





                          Organization              Unknown

 

                          Address                   Unknown

 

                          Phone                     Unavailable



              



Allergies

      



             Active           Description           Code           Type         

  Severity   

                Reaction           Onset           Reported/Identified          

 

Relationship to Patient                 Clinical Status        

 

           Yes           AMOXICILLIN                                   MODERATE 

          

DERMATOLOGICAL - WILLA                                                     

        

 

 

           Yes           AMOXICILLIN                                   MODERATE 

          

MODERATE                                                               

 

                Yes             No Known Drug Allergies           [id number]    

       Drug 

Allergy           Unknown           N/A                             2020  

      

                                                             



                      



Medications

      



                Medication           Packaging           Start Date           St

op Date         

                    Route               Dosage              Sig        

 

                                      OXYCODONE 5MG/APAP 325MG TAB(PERCOCET-5)  

                   TAB    

             2018                                    

         

   PRN Q6H                  

 

                                HYDRALAZINE TAB 25 MG (APRESOLINE)              

       MG                  

2018                                                 

      

PRN Q4H                  

 

                                      ALBUTEROL INHALER MDI 8 GM (VENTOLIN HFA) 

                    

PUFF(S)           2018                               

    

                                                    PRN QID                  

 

                                      Docusate sodium 100mg oral capsule (COLACE

)                     MG  

             2018                                    

       

     BID&0800,2000                  

 

                                METOPROLOL TAB 50 MG (LOPRESSOR)                

     MG                  

2018                                                 

      

BID&0800,2000                  

 

                                VERAPAMIL TAB 80 MG (CALAN)                     

MG                  

2018                                                 

      

Q8H&0600,1400,2200                  

 

                                DOXAZOSIN TAB 2 MG (CARDURA)                    

 MG                  

2018                                                 

      

Daily&0900                  

 

                                VERAPAMIL TAB 80 MG (CALAN)                     

MG                  

2018                                                 

      

Q8H&0600,1400,2200                  

 

                                      Lidocaine adhesive patch 5% (Lidoderm)    

                 PATCH    

             2018           08/10/2018                                    

         

   Daily&0900                  

 

                                CLOPIDOGREL TAB 75 MG (PLAVIX)                  

   MG                  

2018                                                 

      

Daily&0900                  

 

                                DOXAZOSIN TAB 2 MG (CARDURA)                    

 MG                  

2018                                                 

      

Daily&0900                  

 

                                                    ASPIRIN ENTERIC COATED TAB 8

1 MG (BABY ASPIRIN EC)                  

             MG           2018                       

            

                                                    Daily&0900                  

 

                                DIGOXIN TAB 0.125 MG (LANOXIN)                  

   MG                  

2018                                                 

      

Daily&0900                  

 

                                                    LACTULOSE SYRUP LIQ 20 GM/30

CC (CHRONULAC SYRUP)                    

           GM           2018                         

           

          Daily&0900                  

 

                                VERAPAMIL TAB 80 MG (CALAN)                     

MG                  

2018                                                 

      

Q8H&0600,1400,2200                  

 

                                      OXYCODONE 5MG/APAP 325MG TAB(PERCOCET-5)  

                   TAB    

             2018                                    

         

   PRN Q6H                  

 

                                      ALBUTEROL INHALER MDI 8 GM (VENTOLIN HFA) 

                    

PUFF(S)           2018                               

    

                                                    PRN QID                  

 

                                      Docusate sodium 100mg oral capsule (COLACE

)                     MG  

             2018                                    

       

     BID&08                  

 

                                METOPROLOL TAB 50 MG (LOPRESSOR)                

     MG                  

2018                                                 

      

BID&08,                  

 

                                CLOPIDOGREL TAB 75 MG (PLAVIX)                  

   MG                  

2018                                                 

      

Daily&0900                  

 

                                DOXAZOSIN TAB 2 MG (CARDURA)                    

 MG                  

2018                                                 

      

Daily&0900                  

 

                                                    ASPIRIN ENTERIC COATED TAB 8

1 MG (BABY ASPIRIN EC)                  

             MG           2018                       

            

                                                    Daily&0900                  

 

                                DIGOXIN TAB 0.125 MG (LANOXIN)                  

   MG                  

2018           08/10/2018                                                 

      

Daily&0900                  

 

                                      ACETAMINOPHEN ORAL TABLET 325mg(Tylenol)  

                   MG     

             2018                                    

          

  PRN Q6H                  

 

                                LACTATED RINGERS 500CC IV BAG INJ               

      ml                  

2018                                                 

      

CONTINUOUSEVERY 0 Hour                  

 

                                      OXYCODONE 5MG/APAP 325MG TAB(PERCOCET-5)  

                   TAB    

             2018                                    

         

   PRN Q6H                  

 

                                      ALBUTEROL INHALER MDI 8 GM (VENTOLIN HFA) 

                    

PUFF(S)           2018                               

    

                                                    PRN QID                  

 

                                      Normal SALINE 0.9 % (NS 100cc) (plain bag)

                     ml   

             2018                                    

        

    ONCE&1841                  

 

                                                    IPRATROPIUM/ALBUTEROL INH SO

LN (DUO-NEB INH SOLN)                   

             MLS           2018                      

             

                                                    ONCE&1855                  

 

                                      DIGOXIN  inj 250mcg/cc 2cc amp (Lanoxin)  

                   MCG    

             2018                                    

         

   ONCE&1857                  

 

                                                    NORMAL SALINE 1000CC IV BAG 

INJ 0.9 % (NS 1000CC IV BAG)            

             ml           2018                       

      

                                                    CONTINUOUSEVERY 0 Hour      

            

 

                                CLOPIDOGREL TAB 75 MG (PLAVIX)                  

   MG                  

2018                                                 

      

Daily&                  

 

                                      Docusate sodium 100mg oral capsule (COLACE

)                     MG  

             2018                                    

       

     BID&0800,                  

 

                                                    NORMAL SALINE 250CC IV BAG I

NJ 0.9 % (NS 250CC IV BAG)              

             ml           2018                       

        

                                                    ONCE&2000                  

 

                                METOPROLOL TAB 50 MG (LOPRESSOR)                

     MG                  

2018                                                 

      

BID&08,                  

 

                                METOPROLOL TAB 50 MG (LOPRESSOR)                

     MG                  

2018                                                 

      

ONCE&2020                  

 

                                      LACTOBACILLUS BULGARIS TAB (LACTINEX BULGA

RIS)                     

tab           2018                                   

  

           QID&0800,1200,1700,2200                  

 

                                      ACETAMINOPHEN ORAL TABLET 325mg(Tylenol)  

                   MG     

             2018                                    

          

  PRN EVERY 4 Hour                  

 

                                      ACETAMINOPHEN SUPPOS  MG (TYLENOL) 

                    MG    

             2018                                    

         

   PRN Q6H                  

 

                                VERAPAMIL TAB 80 MG (CALAN)                     

MG                  

2018                                                 

      

Q8H&0600,1400,2200                  

 

                                METOPROLOL TAB 50 MG (LOPRESSOR)                

     MG                  

2018                                                 

      

BID&0800,2000                  

 

                                      Normal SALINE 0.9 % (NS 100cc) (plain bag)

                     ml   

             2018                                    

        

    BID&08,                  

 

                                METOPROLOL TAB 50 MG (LOPRESSOR)                

     MG                  

2018                                                 

      

ONCE&0800,                  

 

                                DOXAZOSIN TAB 2 MG (CARDURA)                    

 MG                  

2018                                                 

      

Daily&0900                  

 

                                                    ASPIRIN ENTERIC COATED TAB 8

1 MG (BABY ASPIRIN EC)                  

             MG           2018                       

            

                                                    Daily&0900                  

 

                                DIGOXIN TAB 0.25 MG (LANOXIN)                   

  MG                  

2018                                                 

      

Daily&0900                  

 

                                DIGOXIN TAB 0.125 MG (LANOXIN)                  

   MG                  

2018                                                 

      

Daily&0900                  

 

                                      PANTOPRAZOLE VIAL INJ 40 MG (PROTONIX IV) 

                    MG    

             2018                                    

         

   Daily&0900                  

 

                                IBUPROFEN  MG (MOTRIN)                   

  MG                  

2018           08/15/2018                                                 

      

PRN Q6H                  

 

                                                    DILTIAZEM BOLUS VIAL INJ 25 

MG/5CC (CARDIZEM BOLUS VIAL)            

             MG           2018                       

      

                                                    ONCE&1515                  

 

                                      Normal SALINE 0.9 % (NS 100cc) (plain bag)

                     ml   

             2018           08/15/2018                                    

        

    CONTINUOUSEVERY 0 Hour                  

 

                                                    NORMAL SALINE 250CC IV BAG I

NJ 0.9 % (NS 250CC IV BAG)              

             ml           08/10/2018           2018                       

        

                                                    Q12H&1000,2200              

    

 

                                      OXYCODONE 5MG/APAP 325MG TAB(PERCOCET-5)  

                   TAB    

             08/10/2018           2018                                    

         

   PRN Q6H                  

 

                                      ACETAMINOPHEN ORAL TABLET 325mg(Tylenol)  

                   MG     

             08/10/2018           2018                                    

          

  PRN EVERY 4 Hour                  

 

                                IBUPROFEN  MG (MOTRIN)                   

  MG                  

08/10/2018           2018                                                 

      

PRN Q4H                  

 

                                VERAPAMIL TAB 80 MG (CALAN)                     

MG                  

08/10/2018           2018                                                 

      

Q8H&0600,1400,2200                  

 

                                      LACTOBACILLUS BULGARIS TAB (LACTINEX BULGA

RIS)                     

tab           08/10/2018           2018                                   

  

           QID&0800,1200,1700,2200                  

 

                                      ALBUTEROL INHALER MDI 8 GM (VENTOLIN HFA) 

                    

PUFF(S)           08/10/2018           2018                               

    

                                                    PRN QID                  

 

                                CLOPIDOGREL TAB 75 MG (PLAVIX)                  

   MG                  

08/10/2018           2018                                                 

      

Daily&                  

 

                                      Docusate sodium 100mg oral capsule (COLACE

)                     MG  

             08/10/2018           2018                                    

       

     BID&08,                  

 

                                METOPROLOL TAB 50 MG (LOPRESSOR)                

     MG                  

08/10/2018           2018                                                 

      

BID&0800,2000                  

 

                                      VANCOMYCIN VIAL  MG (VANCOCIN VIAL)

                     MG   

             08/10/2018           2018                                    

        

    Q12H&1000,2200                  

 

                                DOXAZOSIN TAB 2 MG (CARDURA)                    

 MG                  

2018                                                 

      

Daily&0900                  

 

                                                    ASPIRIN ENTERIC COATED TAB 8

1 MG (BABY ASPIRIN EC)                  

             MG           2018                       

            

                                                    Daily&0900                  

 

                                PANTOPRAZOLE TAB 40 MG (PROTONIX)               

      MG                  

2018                                                 

      

Daily&0900                  

 

                                DIGOXIN TAB 0.25 MG (LANOXIN)                   

  MG                  

2018                                                 

      

Daily&0900                  

 

                                                    NORMAL SALINE 250CC IV BAG I

NJ 0.9 % (NS 250CC IV BAG)              

             ml           2018                       

        

                                                    BID&1030,2230               

   

 

                                METOPROLOL TAB 50 MG (LOPRESSOR)                

     MG                  

08/15/2018           2018                                                 

      

BID&0800,2000                  

 

                                CLOPIDOGREL TAB 75 MG (PLAVIX)                  

   MG                  

2018                                                 

      

Daily&0900                  

 

                                DIGOXIN TAB 0.125 MG (LANOXIN)                  

   MG                  

2018                                                 

      

Daily&0900                  

 

                                VERAPAMIL TAB 80 MG (CALAN)                     

Dose(s)             

2018                                                 

      

Q8H&0600,1400,2200                  

 

                                                    NORMAL SALINE 1000CC IV BAG 

INJ 0.9 % (NS 1000CC IV BAG)            

             ml           2019                       

      

                                                    ONCE&1445                  

 

                                      ONDANSETRON VIAL INJ 4 MG/2CC (ZOFRAN 2CC 

VIAL)                     

MG           2019                                    

  

          PRN ONCE                  

 

                                                    THIAMINE VIAL 2CC  MG

/CC (VIT B1 2CC VIAL)                   

           MG           2019                         

          

           ONCE&1730                  

 

                                                    CEFTRIAXONE PREMIX IV BAG IV

 1 GM/50CC (ROCEPHIN PREMIX IV BAG)     

                GM              2019                 

    

                                                                ONCE&1730       

           

 

                                      ACETAMINOPHEN ORAL TABLET 325mg(Tylenol)  

                   MG     

             2019                                    

          

  PRN EVERY 6 Hour                  

 

                                                    MULTIPLE VITAMIN INFUSION IN

J (MVI ADULT 2 VIAL KIT)                

             VIALS           2019                    

          

                                                    ONCE&1738                  

 

                                ALPRAZOLAM TAB 0.25 MG (XANAX)                  

   MG                  

2019                                                 

      

PRN Q6H                  

 

                                      HALOPERIDOL VIAL INJ 5 MG/CC (HALDOL 1CC V

IAL)                     

MG           2019                                    

  

          PRN Q4H                  

 

                                CLONIDINE TAB 0.1 MG (CATAPRES)                 

    MG                  

2019                                                 

      

PRN Q6H                  

 

                                      ACETAMINOPHEN SUPPOS  MG (TYLENOL) 

                    MG    

             2019                                    

         

   PRN Q4H                  

 

                                      ONDANSETRON VIAL INJ 4 MG/2CC (ZOFRAN 2CC 

VIAL)                     

MG           2019                                    

  

          PRN Q4H                  

 

                                      CALCIUM CARBONATE  MG (TUMS)       

              MG          

             2019                                    

             

PRN Q6H                  

 

                                      DIPHENHYDRAMINE CAP 25 MG (BENADRYL)      

               MG         

             2019                                    

             

PRN Q6H                  

 

                                      PROMETHAZINE VIAL INJ 25 MG/CC (PHENERGAN 

VIAL)                     

MG           2019                                    

  

          PRN Q6H                  

 

                                                    HYDROCODONE/APAP 5MG/325MG T

AB 5 MG/325MG (LANDON-TAB 5/325)           

             TAB           2019                      

     

                                                    PRN Q6H                  

 

                                      ALUM/MAG/SIMETH 30CC LIQ (MYLANTA PLUS)   

                  cc      

             2019                                    

           

 PRN Q4H                  

 

                                LORAZEPAM TAB 1 MG (ATIVAN)                     

MG                  

2019                                                 

      

PRN ONCE                  

 

                                      Metoprolol IV soln 5mg/5cc vial (Lopressor

)                     MG  

             2019                                    

       

     ONCE&1923                  

 

                                      Docusate sodium 100mg oral capsule (COLACE

)                         

             2019                                    

        

    PRN BID                  

 

                                METOPROLOL TAB 50 MG (LOPRESSOR)                

     MG                  

2019                                                 

      

BID&0800,2000                  

 

                                VERAPAMIL TAB 80 MG (CALAN)                     

MG                  

2019                                                 

      

BID&0800,2000                  

 

                                                    LACTULOSE SYRUP LIQ 20 GM/30

CC (CHRONULAC SYRUP)                    

           GM           2019                         

           

          BID&0800,2000                  

 

                                MELATONIN TAB 3 MG (MELATONIN)                  

   MG                  

2019                                                 

      

QHS&2100                  

 

                                DOXAZOSIN TAB 8 MG (CARDURA)                    

 MG                  

2019                                                 

      

Daily&0900                  

 

                                BISACODYL TAB 5 MG (DULCOLAX)                   

  MG                  

2019                                                 

      

PRN Daily                  

 

                                DULOXETINE CAP 30 MG (CYMBALTA)                 

    MG                  

2019                                                 

      

Daily&0900                  

 

                                PANTOPRAZOLE TAB 40 MG (PROTONIX)               

      MG                  

2019                                                 

      

Daily&0900                  

 

                                                    POLYETHYLENE GLYCOL POWDER U

D PWD (MIRALAX 17GM UNIT DOSE PAKS)     

                gm              2019                 

    

                                                                Daily&0900      

            

 

                                                    MultiVits (Thera M Plus) (mu

ltivit-iron-calcium) oral tablet        

             TAB           2019                      

  

                                                    Daily&0900                  

 

                                THIAMINE  MG (VITAMIN B1)                

     MG                  

2019                                                 

      

Daily&0900                  

 

                                      BISACODYL SUPPOS 10 MG (DULCOLAX SUPPOS)  

                   MG     

             2019                                    

          

  PRN Daily                  

 

                                                    CEFTRIAXONE PREMIX IV BAG IV

 1 GM/50CC (ROCEPHIN PREMIX IV BAG)     

                GM              2019                 

    

                                                                Daily&0900      

            

 

                                MILK OF MAGNESIA LIQ                     ml     

             2019                                                      

 PRN Daily        

         

 

                                DIGOXIN TAB 0.125 MG (LANOXIN)                  

   MG                  

2019                                                 

      

Daily&0900                  

 

                                                    NORMAL SALINE 250CC IV BAG I

[place] 0.9 % (NS 250CC IV BAG)              

             ml           2019                       

        

                                                    Q12H&1100,2300              

    

 

                                DOXAZOSIN TAB 2 MG (CARDURA)                    

 MG                  

2019                                                 

      

Daily&2100                  

 

                                      SMZ/TMP DS TAB  (SEPTRA DS) (Bactrim DS)  

                   TAB    

             2019                                    

         

   BID&0800,2000                  

 

                                                    MultiVits (Thera M Plus) (

ltivit-iron-calcium) oral tablet        

             TAB           2019                      

  

                                                    Daily&0900                  

 

                                      OXYCODONE 5MG/APAP 325MG TAB(PERCOCET-5)  

                   Dose(s)

             2019                                    

     

       PRN Q6H                  

 

                                      Docusate sodium 100mg oral capsule (COLACE

)                         

             2019                                    

        

    BID&0800,2000                  

 

                                ACETAMINOPHEN  MG (TYLENOL)              

       MG                  

2019                                                 

      

PRN BID                  

 

                                CLOPIDOGREL TAB 75 MG (PLAVIX)                  

   MG                  

2019                                                 

      

Daily&0900                  

 

                                                    ASPIRIN ENTERIC COATED TAB 8

1 MG (BABY ASPIRIN EC)                  

             MG           2019                       

            

                                                    Daily&0900                  

 

                                CEPHALEXIN  MG (KEFLEX)                  

   MG                  

2019                                                 

      

ONCE&0150                  

 

                                CITRATE OF MAGNESIA LIQ                     ml  

                2019                                                      

 ONCE&1751     

            

 

                                      ENEMA (FLEET'S) PhosPho ADULT             

        APPLICATION       

             2019                                    

            

ONCE&1751                  

 

                                                    NORMAL SALINE 1000CC IV BAG 

INJ 0.9 % (NS 1000CC IV BAG)            

             ml           2019                       

      

                                                    ONCE&1516                  

 

                                                    THIAMINE VIAL 2CC  MG

/CC (VIT B1 2CC VIAL)                   

           MG           2019                         

          

           ONCE&1644                  

 

                                LORAZEPAM TAB 1 MG (ATIVAN)                     

MG                  

2019                                                 

      

ONCE&1652                  

 

                                      ACETAMINOPHEN ORAL TABLET 325mg(Tylenol)  

                   MG     

             2019                                    

          

  PRN EVERY 6 Hour                  

 

                                LORAZEPAM per Detox Protocol B                  

   MG                  

2019                                                 

      

PRN ONCE                  

 

                                                    MULTIPLE VITAMIN INFUSION IN

J (MVI ADULT 2 VIAL KIT)                

             VIALS           2019                    

          

                                                    ONCE&1915                  

 

                                      Docusate sodium 100mg oral capsule (COLACE

)                     MG  

             2019                                    

       

     PRN BID                  

 

                                PANTOPRAZOLE TAB 40 MG (PROTONIX)               

      MG                  

2019                                                 

      

BID&0800,2000                  

 

                                MELATONIN TAB 3 MG (MELATONIN)                  

   MG                  

2019                                                 

      

PRN QHS                  

 

                                      HALOPERIDOL VIAL INJ 5 MG/CC (HALDOL 1CC V

IAL)                     

MG           2019                                    

  

          PRN Q4H                  

 

                                      ALUM/MAG/SIMETH 30CC LIQ (MYLANTA PLUS)   

                  cc      

             2019                                    

           

 PRN Q4H                  

 

                                CLONIDINE TAB 0.1 MG (CATAPRES)                 

    MG                  

2019                                                 

      

PRN Q6H                  

 

                                      ONDANSETRON VIAL INJ 4 MG/2CC (ZOFRAN 2CC 

VIAL)                     

MG           2019                                    

  

          PRN Q6H                  

 

                                      CALCIUM CARBONATE  MG (TUMS)       

              MG          

             2019                                    

             

PRN Q6H                  

 

                                      DIPHENHYDRAMINE CAP 25 MG (BENADRYL)      

               MG         

             2019                                    

             

PRN Q6H                  

 

                                      PROMETHAZINE VIAL INJ 25 MG/CC (PHENERGAN 

VIAL)                     

MG           2019                                    

  

          PRN Q6H                  

 

                                LORAZEPAM per Detox Protocol B                  

   MG                  

2019                                                 

      

PRN EVERY 1 Hour                  

 

                                METOPROLOL TAB 50 MG (LOPRESSOR)                

     MG                  

2019                                                 

      

BID&0800,2000                  

 

                                PANTOPRAZOLE TAB 40 MG (PROTONIX)               

      MG                  

2019                                                 

      

BID&0800,2000                  

 

                                                    POLYETHYLENE GLYCOL POWDER U

D PWD (MIRALAX 17GM UNIT DOSE PAKS)     

                gm              2019                 

    

                                                                Daily&0900      

            

 

                                VERAPAMIL TAB 80 MG (CALAN)                     

MG                  

2019                                                 

      

Daily&0900                  

 

                                THIAMINE  MG (VITAMIN B1)                

     MG                  

2019                                                 

      

Daily&0900                  

 

                                      BISACODYL SUPPOS 10 MG (DULCOLAX SUPPOS)  

                   MG     

             2019                                    

          

  PRN Daily                  

 

                                      Normal SALINE 0.9 % (NS 100cc) (plain bag)

                     ml   

             2019                                    

        

    CONTINUOUSEVERY 0 Hour                  

 

                                      GABAPENTIN  MG (NEURONTIN)         

            Dose(s)       

             2019                                    

            

ONCE&1128                  

 

                                                    NORMAL SALINE 1000CC IV BAG 

INJ 0.9 % (NS 1000CC IV BAG)            

             ml           2019                       

      

                                                    CONTINUOUSEVERY 0 Hour      

            

 

                                LORAZEPAM per Detox Protocol B                  

   MG                  

2019                                                 

      

Q6H&0600,1200,1800,2359                  

 

                                                    MULTIPLE VITAMIN INFUSION IN

J (MVI ADULT 2 VIAL KIT)                

             VIALS           2019                    

          

                                                    QPM&2000                  

 

                                      ENOXAPARIN SYRINGE INJ 40 MG (LOVENOX SYRI

NGE)                     

MG           2019                                    

  

          QHS&2100                  

 

                                CLOPIDOGREL TAB 75 MG (PLAVIX)                  

   MG                  

2019                                                 

      

Daily&0900                  

 

                                      LACTOBACILLUS BULGARIS TAB (LACTINEX BULGA

RIS)                      

             2019                                    

     

       QID&0800,1200,1700,2200                  

 

                                APIXABAN TAB 5 MG (ELIQUIS)                     

MG                  

2019                                                 

      

BID&0800,2000                  

 

                                AMLODIPINE TAB 5 MG (NORVASC)                   

  MG                  

2019                                                 

      

ONCE&1006                  

 

                                      ACETAMINOPHEN ORAL TABLET 325mg(Tylenol)  

                   MG     

             2019                                    

          

  PRN EVERY 6 Hour                  

 

                                      ALUM/MAG/SIMETH 30CC LIQ (MYLANTA PLUS)   

                  cc      

             2019           01/10/2020                                    

           

 PRN Q4H                  

 

                                                    POLYETHYLENE GLYCOL POWDER U

D PWD (MIRALAX 17GM UNIT DOSE PAKS)     

                gm              2019           01/10/2020                 

    

                                                                PRN Q3H         

         

 

                                      CALMOSEPTINE OINT TUBE (RISAMINE OINT)    

                 zaira      

             2019                                    

           

 PRN QID                  

 

                                LOPERAMIDE CAP 2 MG (IMMODIUM)                  

   MG                  

2019                                                 

      

PRN QID                  

 

                                METOPROLOL TAB 50 MG (LOPRESSOR)                

     MG                  

2019                                                 

      

BID&0800,2000                  

 

                                GABAPENTIN  MG (NEURONTIN)               

      MG                  

2019                                                 

      

QPM&2000                  

 

                                                    LACTULOSE SYRUP LIQ 20 GM/30

CC (CHRONULAC SYRUP)                    

           GM           2019                         

           

          BID&0800,2000                  

 

                                APIXABAN TAB 5 MG (ELIQUIS)                     

MG                  

2019                                                 

      

BID&0800,2000                  

 

                                CLOPIDOGREL TAB 75 MG (PLAVIX)                  

   MG                  

2020                                                 

      

QAM&0800                  

 

                                VERAPAMIL TAB 80 MG (CALAN)                     

MG                  

2020                                                 

      

QAM&0800                  

 

                                DIGOXIN TAB 0.125 MG (LANOXIN)                  

   MCG                 

2020                                                 

      

QAM&0800                  

 

                                      BISACODYL SUPPOS 10 MG (DULCOLAX SUPPOS)  

                   MG     

             2020                                    

          

  PRN Daily                  

 

                                TAMSULOSIN CAP 0.4 MG (FLOMAX)                  

   MG                  

2020                                                 

      

QPM&1800                  

 

                                      VENLAFAXINE XR CAP 75 MG (EFFEXOR XR)     

                MG        

             2020                                    

             

Daily&0900                  

 

                                FOLIC ACID TAB 1 MG                     MG      

            2020                                                      

 Daily&0900        

         

 

                                                    Multivit-iron-min-folic acid

) tab,CHEWable (Centrum)                

             TAB           2020                      

          

                                                    Daily&0900                  

 

                                MIRTAZAPINE TAB 15 MG (REMERON)                 

    MG                  

2020                                                 

      

QHS&2100                  

 

                                AMLODIPINE TAB 5 MG (NORVASC)                   

  MG                  

2020                                                 

      

Daily&0900                  

 

                                      VENLAFAXINE XR CAP 75 MG (EFFEXOR XR)     

                MG        

             2020                                    

             

Daily&0900                  

 

                                FINASTERIDE TAB 5 MG (PROSCAR)                  

   MG                  

2020                                                 

      

ONCE&1021                  

 

                                                    Flu vacc [id number] 6mos up(P

F)) IM syringe QUAD (Fluarix)           

             ML           2020                       

     

                                                    ONCE&                  

 

                                PNEUMONIA VACCINE INJ (PREVNAR-13)              

       ml                  

2020                                                 

      

ONCE&                  

 

                                MELATONIN TAB 3 MG (MELATONIN)                  

   MG                  

2020                                                 

      

QHS&2100                  

 

                                FINASTERIDE TAB 5 MG (PROSCAR)                  

   MG                  

2020                                                 

      

Daily&0900                  

 

                                APIXABAN TAB 5 MG (ELIQUIS)                     

MG                  

2020                                                 

      

BID&0800,2000                  

 

                                      Metoprolol IV soln 5mg/5cc vial (Lopressor

)                     MG  

             2020                                    

       

     ONCE&1047                  

 

                                                    NORMAL SALINE 1000CC IV BAG 

INJ 0.9 % (NS 1000CC IV BAG)            

             ml           2020                       

      

                                                    ONCE&1047                  

 

                                METOPROLOL TAB 25 MG (LOPRESSOR)                

     MG                  

2020                                                 

      

ONCE&1113                  

 

                                                    IPRATROPIUM/ALBUTEROL INH SO

LN (DUO-NEB INH SOLN)                   

             MLS           2020                      

             

                                                    ONCE&1113                  

 

                                DIGOXIN  MCG (LANOXIN)                   

  MCG                 

2020                                                 

      

ONCE&1113                  



                                                                                
                                                                                
                                                                                
                                                                                
                                                      



Problems

      



             Date Dx Coded           Attending           Type           Code    

       

Diagnosis                               Diagnosed By        

 

             2018           Alesha Paris            285.1    

       ACUTE 

POSTHEMORRHAGIC ANEMIA                           

 

             2018           Alesha Paris            338.2    

       CHRONIC

PAIN                                             

 

             2018           Alesha Paris            401.0    

              

                                                 

 

             2018           Alesha Paris            427.32   

              

                                                 

 

             2018           Alesha Paris            585.9    

       CHRONIC

KIDNEY DISEASE, UNSPECIFIED                      

 

             2018           Alesha Paris            719.45   

        PAIN 

IN JOINT INVOLVING PELVIC REGION AND THIGH                    

 

             2018           Alesha Paris           A            780.79   

              

                                                 

 

             2018           Alesha Paris            D62      

     ACUTE 

POSTHEMORRHAGIC ANEMIA                           

 

             2018           Alesha Paris            G89.29   

        OTHER 

CHRONIC PAIN                                     

 

             2018           Alesha Paris            I10      

     ESSENTIAL

(PRIMARY) HYPERTENSION                           

 

             2018           Alesha Paris            I48.2    

       CHRONIC

ATRIAL FIBRILLATION                              

 

             2018           Paris, Alesha           W            M25.552  

         PAIN 

IN LEFT HIP                                      

 

             2018           Alesha aPris           W            N18.9    

       CHRONIC

KIDNEY DISEASE, UNSPECIFIED                      

 

             2018           Alesha Paris           A            R53.1    

       

WEAKNESS                                         

 

             2018           Alesha Paris           W            R53.81   

        OTHER 

MALAISE                                          

 

             2018           Alesha Paris           W            V12.55   

        

PERSONAL HISTORY OF PULMONARY EMBOLISM                    

 

             2018           Alesha Paris           W            V45.4    

              

                                                 

 

             2018           Alesha Paris           W            Z86.711  

         

PERSONAL HISTORY OF PULMONARY EMBOLISM                    

 

             2018           Alesha Paris           W            Z98.1    

       

ARTHRODESIS STATUS                               

 

             2018           Alesha Paris           W            486      

     

PNEUMONIA, ORGANISM UNSPECIFIED                    

 

             2018           Alesha Paris           W            I48.9    

       

UNSPECIFIED ATRIAL FIBRILLATION AND ATRIAL FLUTTER                    

 

             2018           Alesha Paris           W            J18.9    

       

PNEUMONIA, UNSPECIFIED ORGANISM                    

 

             2018           Alesha Paris           W            486      

     

PNEUMONIA, ORGANISM UNSPECIFIED                    

 

             2018           Alesha Paris           W            I48.9    

       

UNSPECIFIED ATRIAL FIBRILLATION AND ATRIAL FLUTTER                    

 

             2018           Alesha Paris           W            J18.9    

       

PNEUMONIA, UNSPECIFIED ORGANISM                    

 

             08/10/2018           Alesha Paris           W            041.12   

              

                                                 

 

             08/10/2018           Alesha Paris           W            276.51   

              

                                                 

 

             08/10/2018           Alesha Paris           W            303.90   

              

                                                 

 

             08/10/2018           [place] Alesha           W            338.2    

       CHRONIC

PAIN                                             

 

             08/10/2018           Mendez Parisi           W            401.0    

              

                                                 

 

             08/10/2018           Alesha Paris           W            427.31   

              

                                                 

 

             08/10/2018           Alesha Paris           W            486      

     

PNEUMONIA, ORGANISM UNSPECIFIED                    

 

             08/10/2018           [place] Alesha           W            600.20   

        BENIGN

LOCALIZED HYPERPLASIA OF PROSTATE WITHOUT URINARY OBSTRUCTION AND OTHER LOWER 
URINARY TRACT SYMPTOMS (LUTS)                    

 

             08/10/2018           Alesha Paris           A            771.83   

              

                                                 

 

             08/10/2018           Mendez Parisi           W            780.60   

              

                                                 

 

             08/10/2018           Alesha Paris           W            780.79   

        OTHER 

MALAISE AND FATIGUE                              

 

             08/10/2018           Mendez Parisi           W            B95.62   

              

                                                 

 

             08/10/2018           Alesha Paris           W            E86.0    

       

DEHYDRATION                                      

 

             08/10/2018           Alesha Paris           W            F10.20   

        

ALCOHOL DEPENDENCE, UNCOMPLICATED                    

 

             08/10/2018           Alesha Paris           W            G89.29   

        OTHER 

CHRONIC PAIN                                     

 

             08/10/2018           Alesha Paris           W            I10      

     ESSENTIAL

(PRIMARY) HYPERTENSION                           

 

             08/10/2018           Alesha Paris           W            I48.9    

       

UNSPECIFIED ATRIAL FIBRILLATION AND ATRIAL FLUTTER                    

 

             08/10/2018           Alesha Paris           W            I48.91   

        

UNSPECIFIED ATRIAL FIBRILLATION                    

 

             08/10/2018           Alesha Paris           W            J18.9    

       

PNEUMONIA, UNSPECIFIED ORGANISM                    

 

             08/10/2018           Alesha Paris           W            N40.0    

       BENIGN 

PROSTATIC HYPERPLASIA WITHOUT LOWER URINRY TRACT SYMP                    

 

             08/10/2018           Alesha Paris           W            R50.9    

       FEVER, 

UNSPECIFIED                                      

 

             08/10/2018           Alesha Paris           W            R53.81   

        OTHER 

MALAISE                                          

 

             08/10/2018           Alesha Paris           A            R78.81   

              

                                                 

 

             2018           Alesha Paris           W            041.12   

              

                                                 

 

             2018           Alesha Paris           W            303.90   

        OTHER 

AND UNSPECIFIED ALCOHOL DEPENDENCE, UNSPECIFIED DRINKING BEHAVIOR               

    

 

             2018           Alesha Paris           W            401.0    

       

MALIGNANT ESSENTIAL HYPERTENSION                    

 

             2018           Alesha Paris           W            427.31   

        ATRIAL

FIBRILLATION                                     

 

             2018           Alesha Paris           W            486      

              

                                                 

 

             2018           Mendez Parisi           W            530.81   

        

ESOPHAGEAL REFLUX                                

 

             2018           Alesha Paris           A            771.83   

              

                                                 

 

             2018           Alesha Paris           W            780.79   

        OTHER 

MALAISE AND FATIGUE                              

 

             2018           Alesha Paris           W            B95.62   

        

METHICILLIN RESIS STAPH INFCT CAUSING DISEASES CLASSD ELSWHR                    

 

             2018           Alesha Paris           W            F10.20   

        

ALCOHOL DEPENDENCE, UNCOMPLICATED                                               

 

             2018           Alesha Paris           W            I10      

     ESSENTIAL

(PRIMARY) HYPERTENSION                                                

 

             2018           Alesha Paris           W            I48.91   

        

UNSPECIFIED ATRIAL FIBRILLATION                                                 

 

             2018           Alesha Paris           W            J18.9    

       

PNEUMONIA, UNSPECIFIED ORGANISM                                                 

 

             2018           Alesha Paris           W            K21.9    

       GASTRO-

ESOPHAGEAL REFLUX DISEASE WITHOUT ESOPHAGITIS                            

 

             2018           Mendez Parisi           W            R53.81   

        OTHER 

MALAISE                                                                   

 

             2018           Mendez Parisi           A            R78.81   

        

BACTEREMIA                                       

 

             2019           Alesha Paris           W            599.0    

       URINARY

TRACT INFECTION, SITE NOT SPECIFIED                    

 

             2019           Alesha Paris           W            N39.0    

       URINARY

TRACT INFECTION, SITE NOT SPECIFIED                    

 

             2019           Mendez Parisi           W            427.31   

        ATRIAL

FIBRILLATION                                     

 

             2019           Mendez Parisi           W            599.0    

       URINARY

TRACT INFECTION, SITE NOT SPECIFIED                    

 

             2019           Alesha Paris           W            A41      

     OTHER 

SEPSIS                                           

 

             2019           Alesha Paris           W            F10.23   

        

ALCOHOL DEPENDENCE WITH WITHDRAWAL                    

 

             2019           Alesha Paris           W            I48.2    

       CHRONIC

ATRIAL FIBRILLATION                              

 

             2019           Alesha Paris           W            N39.0    

       URINARY

TRACT INFECTION, SITE NOT SPECIFIED                    

 

             2019           Alesha Paris           W            038.9    

       

UNSPECIFIED SEPTICEMIA                           

 

             2019           Alesha Paris           W            276.51   

              

                                                 

 

             2019           Mendez Parisi           W            287.5    

              

                                                 

 

             2019           Wellington Alesha           W            291.81   

              

                                                 

 

             2019           Wellington Alesha           W            427.31   

        ATRIAL

FIBRILLATION                                     

 

             2019           Mendez Parisi           W            599.0    

       URINARY

TRACT INFECTION, SITE NOT SPECIFIED                    

 

             2019           Alesha Paris           W            599.71   

              

                                                 

 

             2019           Wellington Alesha           W            600.20   

              

                                                 

 

             2019           Wellington Alesha           W            781.99   

              

                                                 

 

             2019           Mendez Parisi           W            797      

              

                                                 

 

             2019           Alesha Paris           W            A41.9    

       SEPSIS,

UNSPECIFIED ORGANISM                             

 

             2019           Mendez Parisi           W            D69.6    

              

                                                 

 

             2019           Mendez Parisi           W            E86.0    

       

DEHYDRATION                                                                     

 

             2019           Alesha Paris           W            F10.23   

        

ALCOHOL DEPENDENCE WITH WITHDRAWAL                    

 

             2019           Alesha Paris           W            F10.230  

         

ALCOHOL DEPENDENCE WITH WITHDRAWAL, UNCOMPLICATED                               

 

             2019           Alesha Paris           W            I48.2    

       CHRONIC

ATRIAL FIBRILLATION                              

 

             2019           Alesha Paris           W            N39.0    

       URINARY

TRACT INFECTION, SITE NOT SPECIFIED                    

 

             2019           Alesha Paris           W            N40.0    

              

                                                 

 

             2019           Alesha Paris           W            R29.6    

       

REPEATED FALLS                                                                  

 

             2019           Mendez Parisi           W            R31.0    

       GROSS 

HEMATURIA                                                                 

 

             2019           Mendez Parisi           W            R54      

     AGE-

RELATED PHYSICAL DEBILITY                                                   

 

             2019           Alesha Paris           W            V58.69   

        LONG-

TERM (CURRENT) USE OF OTHER MEDICATIONS                    

 

             2019           ParisMendez malini           W            Z79.899  

         OTHER

LONG TERM (CURRENT) DRUG THERAPY                                          

 

             2019           Tavia Gonzalez           W            303.90 

          

OTHER AND UNSPECIFIED ALCOHOL DEPENDENCE, UNSPECIFIED DRINKING BEHAVIOR         
                                                 

 

             2019           GonzalezTavia           W            401.0  

              

                                                 

 

             2019           GonzalezTavia           W            427.32 

          

ATRIAL FLUTTER                                   

 

             2019           GonzalezTavia           W            724.2  

         

LUMBAGO                                          

 

             2019           GonzalezTavia           W            780.79 

              

                                                 

 

             2019           Gonzalez, Tavia           W            781.2  

              

                                                 

 

             2019           GonzalezTavia           W            F10.20 

          

ALCOHOL DEPENDENCE, UNCOMPLICATED                                               

 

             2019           GonzalezTavia           W            I10    

       

ESSENTIAL (PRIMARY) HYPERTENSION                                                

 

             2019           GonzalezTavia           W            I48.2  

         

CHRONIC ATRIAL FIBRILLATION                                                     

 

             2019           GonzalezTavia           W            M54.5  

         LOW 

BACK PAIN                                                                   

 

             2019           GonzalezTavia           W            R26.89 

          

OTHER ABNORMALITIES OF GAIT AND MOBILITY                                        

 

             2019           GonzalezTavia           W            R53.1  

         

WEAKNESS                                         

 

             2019           GonzalezTavia           W            V15.88 

          

PERSONAL HISTORY OF FALL                         

 

             2019           Tavia Gonzalez           W            V45.4  

         

POSTSURGICAL ARTHRODESIS STATUS                    

 

             2019           Tavia Gonzalez           W            Z91.81 

          

HISTORY OF FALLING                                                              

 

             2019           Tavia Gonzalez           W            Z98.1  

         

ARTHRODESIS STATUS                                                              

 

             2019           DONATO BOGGS           W            784.7

           

EPISTAXIS                                        

 

             2019           DONATO BOGGS           W            R04.0

           

EPISTAXIS                                                                       

 

             05/15/2019           Arnoldo Beltran           W            V58.30  

         

ENCOUNTER FOR CHANGE OR REMOVAL OF NONSURGICAL WOUND DRESSING                   



 

             05/15/2019           Arnoldo Beltran           W            Z48.00  

         

ENCOUNTER FOR CHANGE OR REMOVAL OF NONSURG WOUND DRESSING                       

 

             2019           LEISURE, SHANDA           W            784.7 

          

EPISTAXIS                                        

 

             2019           LEISURE, SHANDA           W            R04.0 

          

EPISTAXIS                                        

 

             2019           LEISURE, SHANDA           W            784.7 

          

EPISTAXIS                                        

 

             2019           LEISURE, LYNIETA           W            R04.0 

          

EPISTAXIS                                        

 

             2019           LEISURE, SHANDA           W            784.7 

          

EPISTAXIS                                        

 

             2019           LEISURE, LYNMEAGANA           W            R04.0 

          

EPISTAXIS                                        

 

             2019           Arnoldo Bletran            564.00  

         

CONSTIPATION, UNSPECIFIED                        

 

             2019           Arnoldo Beltran            B95.62  

         

METHICILLIN RESIS STAPH INFCT CAUSING DISEASES CLASSD ELSWHR                    

 

             2019           Arnoldo Beltran            D62     

      ACUTE 

POSTHEMORRHAGIC ANEMIA                           

 

             2019           Arnoldo Beltran            D69.6   

        

THROMBOCYTOPENIA, UNSPECIFIED                    

 

             2019           Arnoldo Beltran            E86.0   

        

DEHYDRATION                                      

 

             2019           Arnoldo Beltran            F10.20  

         

ALCOHOL DEPENDENCE, UNCOM                        

 

             2019           Arnoldo Beltran            F10.230 

          

ALCOHOL DEPENDENCE WITH WITHDRAWAL, UNCOMPLICATED                    

 

             2019           Arnoldo Beltran            G89.29  

         OTHER

CHRONIC PAIN                                     

 

             2019           Arnoldo Beltran            I10     

      

ESSENTIAL (PRIMARY) HYPERTENSION                    

 

             2019           Arnoldo Beltran            I48.2   

        

CHRONIC ATRIAL FIBRILLATION                      

 

             2019           Arnoldo Beltran            I48.91  

         

UNSPECIFIED ATRIAL FIBRILLATION                    

 

             2019           Arnoldo Beltran            J18.9   

        

PNEUMONIA, UNSPECIFIED ORGANISM                    

 

             2019           Arnoldo Beltran            K59.00  

         

CONSTIPATION, UNSPECIFIED                                                       

 

             2019           Arnoldo Beltran            M54.5   

        LOW 

BACK PAIN                                        

 

             2019           Arnoldo Beltran            N39.0   

        

URINARY TRACT INFECTION, SITE NOT SPECIFIED                    

 

             2019           Arnoldo Beltran            N40.0   

        BENIGN

PROSTATIC HYPERPLASIA WITHOUT LOWER URINRY TRACT SYMP                    

 

             2019           Arnoldo Beltran            R04.0   

        

EPISTAXIS                                        

 

             2019           Arnoldo Beltran            R29.6   

        

REPEATED FALLS                                   

 

             2019           Arnoldo Beltran            R31.0   

        GROSS 

HEMATURIA                                        

 

             2019           Arnoldo Beltran            R50.9   

        FEVER,

UNSPECIFIED                                      

 

             2019           Arnoldo Beltran            R53.1   

        

WEAKNESS                                         

 

             2019           Arnoldo Beltran            R53.81  

         OTHER

MALAISE                                          

 

             2019           Arnoldo Beltran            Z86.711 

          

PERSONAL HISTORY OF PULMONARY EMBOLISM                    

 

             2019           Arnoldo Beltran            Z91.81  

         

HISTORY OF FALLING                               

 

             2019           Arnoldo Beltran            Z98.1   

        

ARTHRODESIS STATUS                               

 

             2019           LUISNAA REESE DO           Ot           K66

.8           

OTHER SPECIFIED DISORDERS OF PERITONEUM                    

 

             2019           LUISANA REESE DO           Ot           K66

.8           

OTHER SPECIFIED DISORDERS OF PERITONEUM                    

 

                2019           LUISANA REESE DO           Ot           

   M48.061         

                          SPINAL STENOSIS, LUMBAR REGION WITHOUT N              

      

 

             2019           LUISANA REESE DO           Ot           Z98

.1           

ARTHRODESIS STATUS                               

 

             10/10/2019           LUISANA REESE DO           Ot           K66

.8           

OTHER SPECIFIED DISORDERS OF PERITONEUM                    

 

             2019           CHOLO GALLAGHER           W            B95

.62           

METHICILLIN RESIS STAPH INFCT CAUSING DISEASES CLASSD ELSWHR                    

 

             2019           ARTHUR MURRAY STORMY           W            D62

           

ACUTE POSTHEMORRHAGIC ANEMIA                     

 

             2019           ARTHUR MURRAY STORMY           W            D69

.6           

THROMBOCYTOPENIA, UNSPECIFIED                    

 

             2019           CHOLO GALLAGHER           W            E86

.0           

DEHYDRATION                                      

 

             2019           ARTHUR MURRAY STORMY           W            F10

.20           

ALCOHOL DEPENDENCE, UNCOMPLICATED                    

 

                2019           ARTHUR MRURAY STORMY           W            

   F10.230          

                          ALCOHOL DEPENDENCE WITH WITHDRAWAL, UNCOMPLICATED     

               

 

             2019           ARTHUR MURRAY STORMY           W            G89

.29           

OTHER CHRONIC PAIN                               

 

             2019           ARTHUR MURRAY STORMY           W            I10

           

ESSENTIAL (PRIMARY) HYPERTENSION                    

 

             2019           CHOLO GALLAGHER           W            I48

.2           

CHRONIC ATRIAL FIBRILLATION                      

 

             2019           ARTHUR MURRAY STORMY           W            I48

.91           

UNSPECIFIED ATRIAL FIBRILLATION                    

 

             2019           ARTHUR MURRAY STORMY           W            J18

.9           

PNEUMONIA, UNSPECIFIED ORGANISM                    

 

             2019           CHOLO GALLAGHER           W            M54

.5           

LOW BACK PAIN                                    

 

             2019           DAJUAN GALLAGHERY           W            N39

.0           

URINARY TRACT INFECTION, SITE NOT SPECIFIED                    

 

             2019           ARTHUR MURRAY STORMY           W            N40

.0           

BENIGN PROSTATIC HYPERPLASIA WITHOUT LOWER URINRY TRACT SYMP                    

 

             2019           ARTHUR MURRAY STORMJULIUS           W            R04

.0           

EPISTAXIS                                        

 

             2019           ARTHUR MURRAY STORMY           W            R29

.6           

REPEATED FALLS                                   

 

             2019           ARTHUR APRJEREMIAS STORMY           W            R31

.0           

GROSS HEMATURIA                                  

 

             2019           ARTHUR APRJEREMIAS STORMY           W            R50

.9           

FEVER, UNSPECIFIED                               

 

             2019           ARTHUR MURRAY STORMY           W            R53

.1           

WEAKNESS                                         

 

             2019           CHOLO GALLAGHER           W            R53

.81           

OTHER MALAISE                                    

 

                2019           CHOLO GALLAGHER           W            

   Z86.711          

                          PERSONAL HISTORY OF PULMONARY EMBOLISM                

    

 

             2019           CHOLO GALLAGHER           W            Z91

.81           

HISTORY OF FALLING                               

 

             2019           CHOLO GALLAGHER           W            Z98

.1           

ARTHRODESIS STATUS                               

 

             2019           Alesha Paris           W            G93.4    

       OTHER 

AND UNSPECIFIED ENCEPHALOPATHY                    

 

             2019           Alesha Paris           W            B95.62   

        

METHICILLIN RESIS STAPH INFCT CAUSING DISEASES CLASSD ELSWHR                    

 

             2019           Alesha Paris           W            D62      

     ACUTE 

POSTHEMORRHAGIC ANEMIA                           

 

             2019           Alesha Paris           W            D69.6    

       

THROMBOCYTOPENIA, UNSPECIFIED                    

 

             2019           Alesha Paris           W            E86.0    

       

DEHYDRATION                                      

 

             2019           Alesha Paris           W            F10.20   

        

ALCOHOL DEPENDENCE, UN                           

 

             2019           Alesha Paris           W            F10.230  

         

ALCOHOL DEPENDENCE WITH WITHDRAWAL, UNCOMPLICATED                    

 

             2019           Alesha Paris           W            G89.29   

        OTHER 

CHRONIC PAIN                                     

 

             2019           Alesha Paris           W            G93.4    

       OTHER 

AND UNSPECIFIED ENCEPHALOPATHY                    

 

             2019           Alesha Paris           W            I10      

     ESSENTIAL

(PRIMARY) HYPERTENSION                           

 

             2019           Alesha Paris           W            I48.2    

       CHRONIC

ATRIAL FIBRILLATION                              

 

             2019           Alesha Paris           W            I48.91   

        

UNSPECIFIED ATRIAL FIBRILLATION                    

 

             2019           Alesha Paris           W            J18.9    

       

PNEUMONIA, UNSPECIFIED ORGANISM                    

 

             2019           Alesha Paris           W            N39.0    

       URINARY

TRACT INFECTION, SITE NOT SPECIFIED                    

 

             2019           Alesha Paris           W            N40.0    

       BENIGN 

PROSTATIC HYPERPLASIA WITHOUT LOWER URINRY TRACT SYMP                    

 

             2019           Alesha Paris           W            R04.0    

       

EPISTAXIS                                        

 

             2019           Alesha Paris           W            R26.89   

        OTHER 

ABNORMALITIES OF GAIT AND MOBILITY                    

 

             2019           Alesha Paris           W            R29.6    

       

REPEATED FALLS                                   

 

             2019           Alesha Paris           W            R31.0    

       GROSS 

HEMATURIA                                        

 

             2019           Alesha Paris           W            R50.9    

       FEVER, 

UNSPECIFIED                                      

 

             2019           Alesha Paris           W            R53.1    

       

WEAKNESS                                         

 

             2019           Alesha Paris           W            R53.81   

        OTHER 

MALAISE                                          

 

             2019           Alesha Paris           W            Z86.711  

         

PERSONAL HISTORY OF PULMONARY EMBOLISM                    

 

             2019           Alesha Paris           JACQUELIN            Z91.81   

        

HISTORY OF FALLING                               

 

             2019           Alesha Paris           JACQUELIN            Z98.1    

       

ARTHRODESIS STATUS                               

 

             2020           OBI CORRAL           W            291

.89           

OTHER                                            

 

             2020           OBI CORRAL           W            296

.34           

MAJOR DEPRESSIVE DISORDER, RECURRENT EPISODE, SEVERE DEG                    

 

             2020           OBI CORRAL           W            427

.31           

ATRIAL FIBRILLATION                              

 

             2020           NATALIIAGLOBI FIGUEREDO           W            780

.52           

I                                                

 

             2020           OBI CORRAL            D62

           

ACUTE POSTHEMORRHAGIC ANEMIA                     

 

             2020           OBI CORRAL            D69

.6           

THROMBOCYTOPENIA, UNSPECIFIED                    

 

             2020           OBI CORRAL            E86

.0           

DEHYDRATION                                      

 

             2020           OBI CORRAL            F10

.14           

ALCOHOL ABUSE WITH ALCOHOL-INDUCED MOOD DISORDER                                

 

             2020           OBI CORRAL            F10

.20           

ALCOHOL DEPENDENCE, UNCOMPLICATED                    

 

                2020           OBI CORRAL           W            

   F10.230          

                          ALCOHOL DEPENDENCE WITH WITHDRAWAL, UNCOMPLICATED     

               

 

             2020           NATALIIAGLOBI FIGUEREDO            F33

.3           

MAJOR DEPRESSV DISORDER, RECURRENT, SEVERE W PSYCH SYMPTOMS                    

 

             2020           OBI CORRAL            G47

.00           

INSOMNIA, UNSPECIFIED                                                           

 

             2020           OBI CORRAL            I10

           

ESSENTIAL (PRIMARY) HYPERTENSION                    

 

             2020           OBI CORRAL            I48

.2           

CHRONIC ATRIAL FIBRILLATION                      

 

             2020           OBI CORRAL            I48

.91           

UNSPECIFIED ATRIAL FIBRILLATION                    

 

             2020           OBI CORRAL            J18

.9           

PNEUMONIA, UNSPECIFIED ORGANISM                    

 

             2020           OBI CORRAL            N40

.0           

BENIGN PROSTATIC HYPERPLASIA WITHOUT LOWER URINRY TRACT SYMP                    

 

             2020           OBI CORRAL            R04

.0           

EPISTAXIS                                        

 

             2020           OBI CORRAL                        R29

.6           

REPEATED FALLS                                   

 

             2020           OBI CORRAL           W            R31

.0           

GROSS HEMATURIA                                  

 

             2020           NATALIIAGLOBI FIGUEREDO           W            R50

.9           

FEVER, UNSPECIFIED                               

 

             2020           NATALIIAGLOBI FIGUEREDO           W            R53

.1           

WEAKNESS                                         

 

             2020           NATALIIAGLOBI FIGUEREDO           W            R53

.81           

OTHER MALAISE                                    

 

             2020           OBI CORRAL            V58

.61           

LONG-TERM (CURRENT)                              

 

             2020           TARTAGLOBI FIGUEREDO           W            Z79

.01           

LONG TERM (CURRENT) USE OF ANTICOAGULANTS                                       

 

                2020           OBI CORRAL            

   Z86.711          

                          PERSONAL HISTORY OF PULMONARY EMBOLISM                

    

 

             2020           OBI CORRAL           W            Z91

.81           

HISTORY OF FALLING                               

 

             2020           OBI CORRAL            Z98

.1           

ARTHRODESIS STATUS                               

 

             2020           CHOLO GALLAGHER           W            B95

.62           

METHICILLIN RESIS STAPH INFCT CAUSING DISEASES CLASSD ELSWHR                    

 

             2020           CHOLO GALLAGHER           W            D62

           

ACUTE POSTHEMORRHAGIC ANEMIA                     

 

             2020           CHOLO GALLAGHER           W            D69

.6           

THROMBOCYTOPENIA, UNSPECIFIED                    

 

             2020           CHOLO GALLAGHER           W            E86

.0           

DEHYDRATION                                      

 

             2020           CHOLO GALLAGHER           W            F10

.20           

ALCOHOL DEPENDENCE, UNCOMPLICATED                    

 

                2020           CHOLO GALLAGHER           W            

   F10.230          

                          ALCOHOL DEPENDENCE WITH WITHDRAWAL, UNCOMPLICATED     

               

 

             2020           CHOLO GALLAGHER           W            G89

.29           

OTHER CHRONIC PAIN                               

 

             2020           CHOLO GALLAGHER           W            I10

           

ESSENTIAL (PRIMARY) HYPERTENSION                    

 

             2020           CHOLO GALLAGHER           W            I48

.2           

CHRONIC ATRIAL FIBRILLATION                      

 

             2020           CHOLO GALLAGHER           W            I48

.91           

UNSPECIFIED ATRIAL FIBRILLATION                    

 

             2020           CHOLO GALLAGHER           W            J18

.9           

PNEUMONIA, UNSPECIFIED ORGANISM                    

 

             2020           CHOLO GALLAGHER                        N18

.9           

CHRONIC KIDNEY DISEASE, U                        

 

             2020           CHOLO GALLAGHER           W            N39

.0           

URINARY TRACT INFECTION, SITE NOT SPECIFIED                    

 

             2020           CHOLO GALLAGHER           W            N40

.0           

BENIGN PROSTATIC HYPERPLASIA WITHOUT LOWER URINRY TRACT SYMP                    

 

             2020           ARTHUR APRN, STORMY           W            R04

.0           

EPISTAXIS                                        

 

             2020           ARTHUR APRN, STORMY           W            R26

.89           

OTHER ABNORMALITIES OF GAIT AND MOBILITY                    

 

             2020           ARTHUR APRN, STORMY           W            R29

.6           

REPEATED FALLS                                   

 

             2020           ARTHUR APRN, STORMY           W            R31

.0           

GROSS HEMATURIA                                  

 

             2020           ARTHUR APRN, STORMY           W            R50

.9           

FEVER, UNSPECIFIED                               

 

             2020           ARTHUR APRN, STORMY           W            R53

.1           

WEAKNESS                                         

 

             2020           ARTHUR APRN, STORMY           W            R53

.81           

OTHER MALAISE                                    

 

                2020           ARTHUR APRN, STORMY           W            

   Z86.711          

                          PERSONAL HISTORY OF PULMONARY EMBOLISM                

    

 

             2020           ARTHUR APRN, STORMY           W            Z91

.81           

HISTORY OF FALLING                               

 

             2020           ARTHUR APRN, STORMY           W            Z98

.1           

ARTHRODESIS STATUS                               

 

             2020           LUISANA REESE DO           Ot           K66

.8           

OTHER SPECIFIED DISORDERS OF PERITONEUM                    

 

             2020           VASILE MAYA MD           Ot           G89.

29           

OTHER CHRONIC PAIN                               

 

             2020           VASILE MAYA MD           Ot           I08.

2           

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                    

 

             2020           VASILE MAYA MD           Ot           I10 

          

ESSENTIAL (PRIMARY) HYPERTENSION                    

 

             2020           VASILE MAYA MD           Ot           I48.

0           

PAROXYSMAL ATRIAL FIBRILLATION                    

 

             2020           VASILE MAYA MD           Ot           M54.

9           

DORSALGIA, UNSPECIFIED                           

 

             2020           VASILE MAYA MD           Ot           N40.

0           

BENIGN PROSTATIC HYPERPLASIA WITHOUT LOW                    

 

             2020           VASILE MAYA MD           Ot           R05 

          

COUGH                                            

 

             2020           VASILE MAYA MD           Ot           R11.

2           

NAUSEA WITH VOMITING, UNSPECIFIED                    

 

             2020           VASILE MAYA MD           Ot           R29.

6           

REPEATED FALLS                                   

 

             2020           VASILE MAYA MD           Ot           R53.

81           

OTHER MALAISE                                    

 

             2020           VASILE MAYA MD           Ot           R53.

83           

OTHER FATIGUE                                    

 

             2020           VASILE MAYA MD           Ot           R63.

4           

ABNORMAL WEIGHT LOSS                             

 

                2020           VASILE MAYA MD           Ot            

  Z20.828          

                          CONTACT W AND EXPOSURE TO OTH VIRAL COMM              

      

 

             2020           VASILE MAYA MD           Ot           Z79.

01           

LONG TERM (CURRENT) USE OF ANTICOAGULANT                    

 

                2020           VASILE MAYA MD           Ot            

  Z87.891          

                          PERSONAL HISTORY OF NICOTINE DEPENDENCE               

     

 

             2020           LUZ CHRISTENSEN MD           Ot           A41.

9           

SEPSIS, UNSPECIFIED ORGANISM                     

 

                2020           LUZ CHRISTENSEN MD           Ot            

  F17.290          

                          NICOTINE DEPENDENCE, OTHER TOBACCO PRODU              

      

 

             2020           LUZ CHRISTENSEN MD           Ot           I10 

          

ESSENTIAL (PRIMARY) HYPERTENSION                    

 

             2020           LUZ CHRISTENSEN MD           Ot           I48.

20           

CHRONIC ATRIAL FIBRILLATION, UNSPECIFIED                    

 

             2020           LUZ CHRISTENSEN MD, Ot           J18.

9           

PNEUMONIA, UNSPECIFIED ORGANISM                    

 

             2020           LUZ CHRISTENSEN MD, Ot           J96.

01           

ACUTE RESPIRATORY FAILURE WITH HYPOXIA                    

 

             2020           LUZ CHRISTENSEN MD, Ot           M54.

9           

DORSALGIA, UNSPECIFIED                           

 

             2020           LUZ CHRISTENSEN MD           Ot           R32 

          

UNSPECIFIED URINARY INCONTINENCE                    

 

                2020           LUZ CHRISTENSEN MD, Ot            

  Z20.828          

                          CONTACT W AND EXPOSURE TO OTH VIRAL COMM              

      

 

             2020           LUZ CHRISTENSEN MD           Ot           Z79.

01           

LONG TERM (CURRENT) USE OF ANTICOAGULANT                    

 

             2020           LUZ CHRISTENSEN MD           Ot           Z90.

89           

ACQUIRED ABSENCE OF OTHER ORGANS                    



                                                                                
                                                                                
                                                                                
                                                                                
                                                                                
                                                                                
                                                                                
     



Procedures

      



There is no data.                  



Results

      



                    Test                Result              Range        

 

                                        Digoxin - 18 16:24         

 

                    Digoxin             0.3 ng/mL           0.5-1.5        

 

                                        Urinalysis - 18 18:02         

 

                    Icotest             Negative            Negative        

 

                    Urine Volume           Urine Volume Sufficient (10mL)       

              

 

                    Urine Yeast           No Yeast present                     

 

                    Urine-Appearance           Slightly Cloudy            Clear 

       

 

                    Urine-Bacteria           Trace                        

 

                    Urine-Bilirubin           1+                  Negative      

  

 

                    Urine-Blood           Negative            Negative        

 

                    Urine-Color           Yellow              Colorless-Lt. Boundary

ow        

 

                    Urine-Epithelial Cells           0-5/HPF                    

  

 

                    Urine-Glucose           Negative            Negative        

 

                    Urine-Ketones           Negative            Negative        

 

                    Urine-Leukocytes           Negative            Negative     

   

 

                    Urine-Mucus           3+                           

 

                    Urine-Nitrite           Negative            Negative        

 

                          Urine-Other                Urine Saved if Culture Need

ed (48hrs from time of 

collection)                                      

 

                    Urine-pH            5.5                 5-8.5        

 

                    Urine-Protein           1+                  Negative        

 

                    Urine-RBC           0-2/HPF                      

 

                    Urine-Specific Gravity           1.025               1.000-1

.030        

 

                    Urine-WBC           Rare/HPF                     

 

                    Urobilinogen           1.0                 0.2-1.0        

 

                                        Mycoplasma - 18 18:55         

 

                    Mycoplasma           Negative            Negative        

 

                                        Blood Culture - 18 18:55         

 

                          PRELIM CULTURE RESULTS           Blood Culture POSITIV

E, Growth Day [id number] 

Positive Cocci noted on Gram Stain, Further Testing Pending                     

 

                    MEDIA PLATED           Blood Culture Media Position C46     

                

 

                    CULTURE SOURCE           right ac                     

 

                                        Sensi - 18 18:55         

 

                          FINAL CULTURE RESULTS           Methicillin Resistant 

Staphylococcus aureus 

(Isolate 1)                                      

 

                    Ampicillin/Sulbactam           <=8/4                        

 

                    Ampicillin           >8                           

 

                    Amoxicillin/K Clavulanate           >4/2                    

     

 

                    Ceftriaxone           >32                          

 

                    Clindamycin           <=0.5                        

 

                    Cefoxitin Screen           >4                           

 

                    Ciprofloxacin           <=1                          

 

                    Daptomycin           <=0.5                        

 

                    Erythromycin           >4                           

 

                    Nitrofurantoin           <=32                         

 

                    Gentamicin           <=4                          

 

                    Gentamicin Synergy Screen           N/R                     

     

 

                    Inducible Clindamycin           <=4/0.5                     

 

 

                    Levofloxacin           <=1                          

 

                    Linezolid           <=1                          

 

                    Moxifloxacin           <=0.5                        

 

                    Oxacillin           >2                           

 

                    Penicillin           >8                           

 

                    Rifampin            <=1                          

 

                    Streptomycin Synergy           N/R                          

 

                    Synercid            <=0.5                        

 

                    Trimethoprim/ Sulfamethoxazole           <=0.5/9.5          

           

 

                    Tetracycline           <=4                          

 

                    Vancomycin           1                            

 

                                        Blood Culture - 18 18:55         

 

                          PRELIM CULTURE RESULTS           Blood Culture POSITIV

E, Growth Day [id number] 

Negative Rods seen on Gram Stain, Further Testing is pending                    



 

                          FINAL CULTURE RESULTS           MRSA, same MAHSA as othe

r blood culture.          

                                                 

 

                    MEDIA PLATED           Blood Culture Media Position C50     

                

 

                    CULTURE SOURCE           left ac                      

 

                                        BMP - 18 05:41         

 

                    Anion Gap           19                  6-14        

 

                    BUN                 17 mg/dL            5-25        

 

                    Calcium             8.8 mg/dL           8.3-10.4        

 

                    Chloride            102 mmol/L                   

 

                    CO2                 21 mEq/L            22-33        

 

                    Creat               0.88 mg/dL           0.50-1.50        

 

                    eGFR                84 mL/min/1.73m2           >59        

 

                    Glucose             111 mg/dL                   

 

                    Osmo                285                 280-295        

 

                    Potassium           4.5 mmol/L           3.5-5.3        

 

                    Sodium              137 mmol/L           134-148        

 

                                        Comprehensive Metabolic Panel - 18

 05:15         

 

                    Albumin             3.4 g/dL            3.6-5.1        

 

                    ALP                 68 U/L                      

 

                    ALT                 23 U/L              6-45        

 

                    Anion Gap           16                  6-14        

 

                    AST                 24 U/L              2-40        

 

                    BUN                 16 mg/dL            5-25        

 

                    Calcium             8.5 mg/dL           8.3-10.4        

 

                    Chloride            102 mmol/L                   

 

                    CO2                 22 mEq/L            22-33        

 

                    Creat               0.86 mg/dL           0.50-1.50        

 

                    eGFR                87 mL/min/1.73m2           >59        

 

                    Globulin            2.5 g/dL            2.3-3.5        

 

                    Glucose             120 mg/dL                   

 

                    Osmo                281                 280-295        

 

                    Potassium           4.5 mmol/L           3.5-5.3        

 

                    Sodium              135 mmol/L           134-148        

 

                    TBil                1.0 mg/dL           0.2-1.2        

 

                    TP                  5.9 g/dL            6.0-8.3        

 

                                        Vancomycin Trough - 18 09:00      

   

 

                    Vanco Trough           7.5 ug/mL           10.0-20.0        

 

                                        Vancomycin Trough - 18 09:51      

   

 

                    Vanco Trough           12.5 ug/mL           10.0-20.0       

 

 

                                        Comprehensive Metabolic Panel - 18

 11:10         

 

                    Albumin             3.1 g/dL            3.6-5.1        

 

                    ALP                 83 U/L                      

 

                    ALT                 20 U/L              6-45        

 

                    Anion Gap           16                  6-14        

 

                    AST                 25 U/L              2-40        

 

                    BUN                 8 mg/dL             5-25        

 

                    Calcium             8.7 mg/dL           8.3-10.4        

 

                    Chloride            103 mmol/L                   

 

                    CO2                 22 mEq/L            22-33        

 

                    Creat               0.72 mg/dL           0.50-1.50        

 

                    eGFR                106 mL/min/1.73m2           >59        

 

                    Globulin            2.3 g/dL            2.3-3.5        

 

                    Glucose             135 mg/dL                   

 

                    Osmo                281                 280-295        

 

                    Potassium           4.5 mmol/L           3.5-5.3        

 

                    Sodium              136 mmol/L           134-148        

 

                    TBil                0.9 mg/dL           0.2-1.2        

 

                    TP                  5.4 g/dL            6.0-8.3        

 

                                        Vancomycin Trough - 18 11:00      

   

 

                    Vanco Trough           15.8 ug/mL           10.0-20.0       

 

 

                                        Comprehensive Metabolic Panel - 18

 05:51         

 

                    Albumin             3.4 g/dL            3.6-5.1        

 

                    ALP                 77 U/L                      

 

                    ALT                 19 U/L              6-45        

 

                    Anion Gap           14                  6-14        

 

                    AST                 21 U/L              2-40        

 

                    BUN                 4 mg/dL             5-25        

 

                    Calcium             9.3 mg/dL           8.3-10.4        

 

                    Chloride            103 mmol/L                   

 

                    CO2                 25 mEq/L            22-33        

 

                    Creat               0.82 mg/dL           0.50-1.50        

 

                    eGFR                92 mL/min/1.73m2           >59        

 

                    Globulin            2.7 g/dL            2.3-3.5        

 

                    Glucose             148 mg/dL                   

 

                    Osmo                285                 280-295        

 

                    Potassium           4.2 mmol/L           3.5-5.3        

 

                    Sodium              138 mmol/L           134-148        

 

                    TBil                0.9 mg/dL           0.2-1.2        

 

                    TP                  6.1 g/dL            6.0-8.3        

 

                                        Digoxin - 19 13:43         

 

                    Digoxin             0.6 ng/mL           0.5-1.5        

 

                                        Rapid Drug Screen + ETOH,Medical -  13:43         

 

                    Amphetamine           NEGATIVE            NEGATIVE        

 

                    Barbiturates           NEGATIVE            NEGATIVE        

 

                    Benzodiazepines           NEGATIVE            NEGATIVE      

  

 

                    Cocaine             NEGATIVE            NEGATIVE        

 

                    Ethanol, Urine           71.50 mg/dL           20.00-80.00  

      

 

                    Marijuana           NEGATIVE            NEGATIVE        

 

                    Methylenedioxymethamphetamine           NEGATIVE            

NEGATIVE        

 

                    Opiates             NEGATIVE            NEGATIVE        

 

                    Oxycodone           NEGATIVE            NEGATIVE        

 

                    Phencyclidine           NEGATIVE            NEGATIVE        

 

                    Propoxyphene           NEGATIVE            NEGATIVE        

 

                    Tricyclic Antidepressant           NEGATIVE            NEGAT

CRISS        

 

                                        Thyroid Stimulating Hormone - 19 1

3:43         

 

                    TSH                 1.95 mIU/mL           0.32-5.00        

 

                                        Blood Culture - 19 13:43         

 

                          PRELIM CULTURE RESULTS           Blood Culture Negativ

e, No Growth Day 1        

                                                 

 

                          FINAL CULTURE RESULTS           Blood Culture Negative

, No Growth Day 5         

                                                 

 

                    MEDIA PLATED           Blood Culture Media Position B32     

                

 

                    CULTURE SOURCE           left arm                     

 

                                        Urine Culture - 19 13:43         

 

                          PRELIM CULTURE RESULTS           >100,000 Gram Positiv

e [facility] / ID to Follow      

                                                 

 

                    CULTURE SOURCE           Cath                         

 

                                        Blood Culture - 19 15:58         

 

                          PRELIM CULTURE RESULTS           Blood Culture Negativ

e, No Growth Day 1        

                                                 

 

                          FINAL CULTURE RESULTS           Blood Culture Negative

, No Growth Day 5         

                                                 

 

                    MEDIA PLATED           Blood Culture Media Position C43     

                

 

                    CULTURE SOURCE           Right Hand                     

 

                                        Protime  - 19 16:48         

 

                    INR                 1.1                 1.0-4.0        

 

                    Protime             12.7 Sec            9.9-12.8        

 

                                        Cardiac Panel - 19 19:23         

 

                    CK                  138 U/L                     

 

                    CK-MB               1.3 ng/ml           0.0-9.2        

 

                    Myoglobin           72.5 ng/ml           1.6-154.9        

 

                    Troponin            <0.020 ng/mL           0.0-0.4        

 

                                        EKG - 19 20:34         

 

                    EKG                 Complete                     

 

                                        Comprehensive Metabolic Panel - 19

 05:53         

 

                    Albumin             3.5 g/dL            3.6-5.1        

 

                    ALP                 71 U/L                      

 

                    ALT                 27 U/L              6-45        

 

                    Anion Gap           15                  6-14        

 

                    AST                 68 U/L              2-40        

 

                    BUN                 8 mg/dL             5-25        

 

                    Calcium             8.3 mg/dL           8.3-10.4        

 

                    Chloride            105 mmol/L                   

 

                    CO2                 25 mEq/L            22-33        

 

                    Creat               0.87 mg/dL           0.50-1.50        

 

                    eGFR                85 mL/min/1.73m2           >59        

 

                    Globulin            2.4 g/dL            2.3-3.5        

 

                    Glucose             120 mg/dL                   

 

                    Osmo                291                 280-295        

 

                    Potassium           3.6 mmol/L           3.5-5.3        

 

                    Sodium              141 mmol/L           134-148        

 

                    TBil                2.0 mg/dL           0.2-1.2        

 

                    TP                  5.9 g/dL            6.0-8.3        

 

                                        MRSA Screen - 19 06:47         

 

                    FINAL CULTURE RESULTS           MRSA Negative Nasal Culture 

                    

 

                                        Comprehensive Metabolic Panel - 19

 05:00         

 

                    Albumin             3.2 g/dL            3.6-5.1        

 

                    ALP                 70 U/L                      

 

                    ALT                 21 U/L              6-45        

 

                    Anion Gap           13                  6-14        

 

                    AST                 42 U/L              2-40        

 

                    BUN                 6 mg/dL             5-25        

 

                    Calcium             7.9 mg/dL           8.3-10.4        

 

                    Chloride            103 mmol/L                   

 

                    CO2                 26 mEq/L            22-33        

 

                    Creat               0.89 mg/dL           0.50-1.50        

 

                    eGFR                83 mL/min/1.73m2           >59        

 

                    Globulin            2.3 g/dL            2.3-3.5        

 

                    Glucose             137 mg/dL                   

 

                    Osmo                285                 280-295        

 

                    Potassium           3.6 mmol/L           3.5-5.3        

 

                    Sodium              138 mmol/L           134-148        

 

                    TBil                1.4 mg/dL           0.2-1.2        

 

                    TP                  5.5 g/dL            6.0-8.3        

 

                                        C.difficile, DNA Amplification - 

9 23:36         

 

                          C.difficile, DNA Amplification           NEGATIVE: No 

DNA evidence of toxogenic 

C. difficile detected.                  Negative        

 

                                        Comprehensive Metabolic Panel - 19

 05:10         

 

                    Albumin             3.2 g/dL            3.6-5.1        

 

                    ALP                 66 U/L                      

 

                    ALT                 19 U/L              6-45        

 

                    Anion Gap           12                  6-14        

 

                    AST                 34 U/L              2-40        

 

                    BUN                 10 mg/dL            5-25        

 

                    Calcium             8.0 mg/dL           8.3-10.4        

 

                    Chloride            105 mmol/L                   

 

                    CO2                 25 mEq/L            22-33        

 

                    Creat               0.85 mg/dL           0.50-1.50        

 

                    eGFR                88 mL/min/1.73m2           >59        

 

                    Globulin            2.3 g/dL            2.3-3.5        

 

                    Glucose             118 mg/dL                   

 

                    Osmo                285                 280-295        

 

                    Potassium           3.6 mmol/L           3.5-5.3        

 

                    Sodium              138 mmol/L           134-148        

 

                    TBil                0.9 mg/dL           0.2-1.2        

 

                    TP                  5.5 g/dL            6.0-8.3        

 

                                        Comprehensive Metabolic Panel - 19

 05:20         

 

                    Albumin             3.7 g/dL            3.6-5.1        

 

                    ALP                 70 U/L                      

 

                    ALT                 20 U/L              6-45        

 

                    Anion Gap           15                  6-14        

 

                    AST                 34 U/L              2-40        

 

                    BUN                 8 mg/dL             5-25        

 

                    Calcium             8.9 mg/dL           8.3-10.4        

 

                    Chloride            102 mmol/L                   

 

                    CO2                 26 mEq/L            22-33        

 

                    Creat               0.89 mg/dL           0.50-1.50        

 

                    eGFR                83 mL/min/1.73m2           >59        

 

                    Globulin            2.5 g/dL            2.3-3.5        

 

                    Glucose             112 mg/dL                   

 

                    Osmo                286                 280-295        

 

                    Potassium           3.9 mmol/L           3.5-5.3        

 

                    Sodium              139 mmol/L           134-148        

 

                    TBil                1.1 mg/dL           0.2-1.2        

 

                    TP                  6.2 g/dL            6.0-8.3        

 

                                        Comprehensive Metabolic Panel - 19

 05:15         

 

                    Albumin             3.4 g/dL            3.6-5.1        

 

                    ALP                 61 U/L                      

 

                    ALT                 19 U/L              6-45        

 

                    Anion Gap           13                  6-14        

 

                    AST                 34 U/L              2-40        

 

                    BUN                 7 mg/dL             5-25        

 

                    Calcium             8.6 mg/dL           8.3-10.4        

 

                    Chloride            104 mmol/L                   

 

                    CO2                 25 mEq/L            22-33        

 

                    Creat               0.86 mg/dL           0.50-1.50        

 

                    eGFR                87 mL/min/1.73m2           >59        

 

                    Globulin            2.3 g/dL            2.3-3.5        

 

                    Glucose             98 mg/dL                    

 

                    Osmo                283                 280-295        

 

                    Potassium           3.9 mmol/L           3.5-5.3        

 

                    Sodium              138 mmol/L           134-148        

 

                    TBil                1.0 mg/dL           0.2-1.2        

 

                    TP                  5.7 g/dL            6.0-8.3        

 

                                        VITAMIN D, 25-H - 19 11:19        

 

 

                    VITAMIN D,25-OH,TOTAL,IA           34 ng/mL            30-10

0        

 

                                        Comprehensive Metabolic Panel - 19

 01:20         

 

                    Albumin             3.9 g/dL            3.6-5.1        

 

                    ALP                 60 U/L                      

 

                    ALT                 14 U/L              6-45        

 

                    Anion Gap           14                  6-14        

 

                    AST                 26 U/L              2-40        

 

                    BUN                 11 mg/dL            5-25        

 

                    Calcium             9.8 mg/dL           8.3-10.4        

 

                    Chloride            106 mmol/L                   

 

                    CO2                 22 mEq/L            22-33        

 

                    Creat               0.93 mg/dL           0.50-1.50        

 

                    eGFR                79 mL/min/1.73m2           >59        

 

                    Globulin            3.1 g/dL            2.3-3.5        

 

                    Glucose             137 mg/dL                   

 

                    Osmo                287                 280-295        

 

                    Potassium           4.0 mmol/L           3.5-5.3        

 

                    Sodium              138 mmol/L           134-148        

 

                    TBil                0.6 mg/dL           0.2-1.2        

 

                    TP                  7.0 g/dL            6.0-8.3        

 

                                        HH - 19 16:01         

 

                    Hct                 40.5 %              42.0-52.0        

 

                    Hgb                 14.3 g/dL           14.0-17.0        

 

                                        Ethanol - 19 13:03         

 

                    Ethanol             294 mg/dL           20-80        

 

                                        Thyroid Stimulating Hormone - 19 1

6:44         

 

                    TSH                 1.22 mIU/mL           0.32-5.00        

 

                                        Rapid Drug Screen + ETOH,Medical -  18:41         

 

                    Amphetamine           NEGATIVE            NEGATIVE        

 

                    Barbiturates           NEGATIVE            NEGATIVE        

 

                    Benzodiazepines           POSITIVE            NEGATIVE      

  

 

                    Cocaine             NEGATIVE            NEGATIVE        

 

                    Ethanol, Urine           304.70 mg/dL           20.00-80.00 

       

 

                    Marijuana           NEGATIVE            NEGATIVE        

 

                    Methylenedioxymethamphetamine           NEGATIVE            

NEGATIVE        

 

                    Opiates             NEGATIVE            NEGATIVE        

 

                    Oxycodone           NEGATIVE            NEGATIVE        

 

                    Phencyclidine           NEGATIVE            NEGATIVE        

 

                    Propoxyphene           NEGATIVE            NEGATIVE        

 

                    Tricyclic Antidepressant           NEGATIVE            NEGAT

CRISS        

 

                                        Comprehensive Metabolic Panel - 19

 05:47         

 

                    Albumin             3.6 g/dL            3.6-5.1        

 

                    ALP                 52 U/L                      

 

                    ALT                 36 U/L              6-45        

 

                    Anion Gap           18                  6-14        

 

                    AST                 74 U/L              2-40        

 

                    BUN                 7 mg/dL             5-25        

 

                    Calcium             8.0 mg/dL           8.3-10.4        

 

                    Chloride            108 mmol/L                   

 

                    CO2                 21 mEq/L            22-33        

 

                    Creat               0.74 mg/dL           0.50-1.50        

 

                    eGFR                103 mL/min/1.73m2           >59        

 

                    Globulin            2.3 g/dL            2.3-3.5        

 

                    Glucose             75 mg/dL                    

 

                    Osmo                292                 280-295        

 

                    Potassium           4.0 mmol/L           3.5-5.3        

 

                    Sodium              143 mmol/L           134-148        

 

                    TBil                1.1 mg/dL           0.2-1.2        

 

                    TP                  5.9 g/dL            6.0-8.3        

 

                                        EKG - 19 11:13         

 

                    EKG                 Complete                     

 

                                        Comprehensive Metabolic Panel - 19

 05:30         

 

                    Albumin             3.3 g/dL            3.6-5.1        

 

                    ALP                 49 U/L                      

 

                    ALT                 27 U/L              6-45        

 

                    Anion Gap           14                  6-14        

 

                    AST                 51 U/L              2-40        

 

                    BUN                 6 mg/dL             5-25        

 

                    Calcium             8.1 mg/dL           8.3-10.4        

 

                    Chloride            108 mmol/L                   

 

                    CO2                 23 mEq/L            22-33        

 

                    Creat               0.76 mg/dL           0.50-1.50        

 

                    eGFR                100 mL/min/1.73m2           >59        

 

                    Globulin            2.1 g/dL            2.3-3.5        

 

                    Glucose             109 mg/dL                   

 

                    Osmo                289                 280-295        

 

                    Potassium           4.0 mmol/L           3.5-5.3        

 

                    Sodium              141 mmol/L           134-148        

 

                    TBil                2.0 mg/dL           0.2-1.2        

 

                    TP                  5.4 g/dL            6.0-8.3        

 

                                        Comprehensive Metabolic Panel - 19

 05:24         

 

                    Albumin             3.3 g/dL            3.6-5.1        

 

                    ALP                 53 U/L                      

 

                    ALT                 23 U/L              6-45        

 

                    Anion Gap           14                  6-14        

 

                    AST                 36 U/L              2-40        

 

                    BUN                 4 mg/dL             5-25        

 

                    Calcium             8.1 mg/dL           8.3-10.4        

 

                    Chloride            108 mmol/L                   

 

                    CO2                 23 mEq/L            22-33        

 

                    Creat               0.75 mg/dL           0.50-1.50        

 

                    eGFR                101 mL/min/1.73m2           >59        

 

                    Globulin            2.2 g/dL            2.3-3.5        

 

                    Glucose             126 mg/dL                   

 

                    Osmo                290                 280-295        

 

                    Potassium           3.9 mmol/L           3.5-5.3        

 

                    Sodium              141 mmol/L           134-148        

 

                    TBil                1.6 mg/dL           0.2-1.2        

 

                    TP                  5.5 g/dL            6.0-8.3        

 

                                        Comprehensive Metabolic Panel - 19

 05:25         

 

                    Albumin             3.3 g/dL            3.6-5.1        

 

                    ALP                 56 U/L                      

 

                    ALT                 20 U/L              6-45        

 

                    Anion Gap           12                  6-14        

 

                    AST                 28 U/L              2-40        

 

                    BUN                 3 mg/dL             5-25        

 

                    Calcium             8.4 mg/dL           8.3-10.4        

 

                    Chloride            108 mmol/L                   

 

                    CO2                 26 mEq/L            22-33        

 

                    Creat               0.77 mg/dL           0.50-1.50        

 

                    eGFR                98 mL/min/1.73m2           >59        

 

                    Globulin            2.3 g/dL            2.3-3.5        

 

                    Glucose             119 mg/dL                   

 

                    Osmo                291                 280-295        

 

                    Potassium           3.9 mmol/L           3.5-5.3        

 

                    Sodium              142 mmol/L           134-148        

 

                    TBil                1.3 mg/dL           0.2-1.2        

 

                    TP                  5.6 g/dL            6.0-8.3        

 

                                        Lipid Panel - 20 05:45         

 

                    C/HDL               2.4                 3.7-6.7        

 

                    Cholesterol           137 mg/dL           100-240        

 

                    HDL                 58 mg/dL            30-85        

 

                    LDL-Calculated           65 mg/dL            0-100        

 

                    Trig                70 mg/dL                    

 

                    VLDL                14 mg/dL            0-42        

 

                                        Urinalysis - 20 14:11         

 

                    Icotest             N/A                 Negative        

 

                    Urine Volume           Urine Volume Sufficient (10mL)       

              

 

                    Urine-Appearance           Clear               Clear        

 

                    Urine-Bacteria           Trace                        

 

                    Urine-Bilirubin           Negative            Negative      

  

 

                    Urine-Blood           3+                  Negative        

 

                    Urine-Color           Yellow              Colorless-Lt. Boundary

ow        

 

                    Urine-Epithelial Cells           5-10/HPF                   

  

 

                    Urine-Glucose           Negative            Negative        

 

                    Urine-Ketones           Negative            Negative        

 

                    Urine-Leukocytes           Negative            Negative     

   

 

                    Urine-Nitrite           Negative            Negative        

 

                    Urine-Other           Culture to follow                     

 

                    Urine-pH            6.0                 5-8.5        

 

                    Urine-Protein           Negative            Negative        

 

                    Urine-RBC           20-40/HPF                     

 

                    Urine-Specific Gravity           1.020               1.000-1

.030        

 

                    Urine-WBC           2-5/HPF                      

 

                    Urobilinogen           1.0 E.U./dL            0.2-1.0       

 

 

                                        Urine Culture - 20 14:11         

 

                    PRELIM CULTURE RESULTS           No Growth 24 hours         

            

 

                    FINAL CULTURE RESULTS           No Growth 48 hours          

           

 

                    MEDIA PLATED           Setup at 14:17 on 2020           

          

 

                    CULTURE SOURCE           clean catch from [facility]               

      

 

                                        Comprehensive Metabolic Panel - 20

 05:29         

 

                    Albumin             3.5 g/dL            3.6-5.1        

 

                    ALP                 52 U/L                      

 

                    ALT                 17 U/L              6-45        

 

                    Anion Gap           13                  6-14        

 

                    AST                 20 U/L              2-40        

 

                    BUN                 9 mg/dL             5-25        

 

                    Calcium             9.2 mg/dL           8.3-10.4        

 

                    Chloride            103 mmol/L                   

 

                    CO2                 28 mEq/L            22-33        

 

                    Creat               0.78 mg/dL           0.50-1.50        

 

                    eGFR                97 mL/min/1.73m2           >59        

 

                    Globulin            2.2 g/dL            2.3-3.5        

 

                    Glucose             119 mg/dL                   

 

                    Osmo                289                 280-295        

 

                    Potassium           4.2 mmol/L           3.5-5.3        

 

                    Sodium              140 mmol/L           134-148        

 

                    TBil                0.7 mg/dL           0.2-1.2        

 

                    TP                  5.7 g/dL            6.0-8.3        

 

                                        Ethanol - 20 09:50         

 

                    Ethanol             125 mg/dL           20-80        

 

                                        Digoxin - 20 10:50         

 

                    Digoxin             0.5 ng/mL           0.5-1.5        

 

                                        EKG - 20 12:55         

 

                    EKG                 Complete                     

 

                                        Complete blood count (CBC) with automate

d white blood cell (WBC) differential - 

20 02:50         

 

                          Blood leukocytes automated count (number/volume)      

     6.9 10*3/uL          

                                        4.3-11.0        

 

                          Blood erythrocytes automated count (number/volume)    

       4.50 10*6/uL       

                                        4.35-5.85        

 

                    Venous blood hemoglobin measurement (mass/volume)           

13.3 g/dL           

13.3-17.7        

 

                    Blood hematocrit (volume fraction)           39 %           

     40-54        

 

                    Automated erythrocyte mean corpuscular volume           86 [

foz_us]           

80-99        

 

                                        Automated erythrocyte mean corpuscular h

emoglobin (mass per erythrocyte)        

                          30 pg                     25-34        

 

                                        Automated erythrocyte mean corpuscular h

emoglobin concentration measurement 

(mass/volume)             34 g/dL                   32-36        

 

                    Automated erythrocyte distribution width ratio           14.

1 %              10.0-

14.5        

 

                    Automated blood platelet count (count/volume)           264 

10*3/uL           

130-400        

 

                          Automated blood platelet mean volume measurement      

     8.8 [foz_us]         

                                        7.4-10.4        

 

                    Automated blood neutrophils/100 leukocytes           77 %   

             42-75       

 

 

                    Automated blood lymphocytes/100 leukocytes           13 %   

             12-44       

 

 

                    Blood monocytes/100 leukocytes           10 %               

 0-12        

 

                    Automated blood eosinophils/100 leukocytes           0 %    

             0-10        

 

                    Automated blood basophils/100 leukocytes           0 %      

           0-10        

 

                    Blood neutrophils automated count (number/volume)           

5.3 10*3            

1.8-7.8        

 

                    Blood lymphocytes automated count (number/volume)           

0.9 10*3            

1.0-4.0        

 

                    Blood monocytes automated count (number/volume)           0.

7 10*3            

0.0-1.0        

 

                    Automated eosinophil count           0.0 10*3/uL           0

.0-0.3        

 

                    Automated blood basophil count (count/volume)           0.0 

10*3/uL           

0.0-0.1        

 

                                        Comprehensive metabolic panel - 20

 02:50         

 

                          Serum or plasma sodium measurement (moles/volume)     

      138 mmol/L          

                                        135-145        

 

                          Serum or plasma potassium measurement (moles/volume)  

         3.9 mmol/L       

                                        3.6-5.0        

 

                          Serum or plasma chloride measurement (moles/volume)   

        100 mmol/L        

                                                

 

                    Carbon dioxide           23 mmol/L           21-32        

 

                          Serum or plasma anion gap determination (moles/volume)

           15 mmol/L      

                                        5-14        

 

                          Serum or plasma urea nitrogen measurement (mass/volume

)           7 mg/dL       

                                        7-18        

 

                          Serum or plasma creatinine measurement (mass/volume)  

         0.84 mg/dL       

                                        0.60-1.30        

 

                    Serum or plasma urea nitrogen/creatinine mass ratio         

  8                   NRG  

      

 

                                        Serum or plasma creatinine measurement w

ith calculation of estimated glomerular 

filtration rate           >                         NRG        

 

                    Serum or plasma glucose measurement (mass/volume)           

165 mg/dL           

        

 

                    Serum or plasma calcium measurement (mass/volume)           

9.3 mg/dL           

8.5-10.1        

 

                          Serum or plasma total bilirubin measurement (mass/volu

me)           0.8 mg/dL   

                                        0.1-1.0        

 

                                        Serum or plasma alkaline phosphatase zack

surement (enzymatic activity/volume)    

                          76 U/L                            

 

                                        Serum or plasma aspartate aminotransfera

se measurement (enzymatic 

activity/volume)           20 U/L                    5-34        

 

                                        Serum or plasma alanine aminotransferase

 measurement (enzymatic activity/volume)

                          10 U/L                    0-55        

 

                    Serum or plasma protein measurement (mass/volume)           

7.4 g/dL            

6.4-8.2        

 

                    Serum or plasma albumin measurement (mass/volume)           

3.7 g/dL            

3.2-4.5        

 

                    CALCIUM CORRECTED           9.5 mg/dL           8.5-10.1    

    

 

                                        PT panel in platelet poor plasma by coag

ulation assay - 20 02:50         

 

                          Prothrombin time (PT) in platelet poor plasma by coagu

lation assay           

16.7 s                                  12.2-14.7        

 

                          INR in platelet poor plasma or blood by coagulation as

say           1.3         

                                        0.8-1.4        

 

                                        Activated partial thromboplastin time (a

PTT) in platelet poor plasma 

bycoagulation assay - 20 02:50         

 

                                        Activated partial thromboplastin time (a

PTT) in platelet poor plasma 

bycoagulation assay           39 s                      24-35        

 

                                        Magnesium - 20 02:50         

 

                    Magnesium           1.6 mg/dL           1.6-2.4        

 

                                        Serum or plasma amylase measurement (enz

ymatic activity/volume) - 20 02:50

         

 

                          Serum or plasma amylase measurement (enzymatic activit

y/volume)           35 U/L

                                                

 

                                        Lipase - 20 02:50         

 

                    Lipase              22 U/L              8-78        

 

                                        [id number] - 20 02:50         

 

                    [id number]             0.42 ng/mL           0.80-2.00        

 

                                        Serum or plasma troponin i.cardiac measu

rement (mass/volume) - 20 02:50   

      

 

                          Serum or plasma troponin i.cardiac measurement (mass/v

olume)           < ng/mL  

                                        <0.028        

 

                                        Serum ragweed IgE antibody assay -  02:50         

 

                    Serum ragweed IgE antibody assay           294 U/L          

   125-220        

 

                                        Fibrin D-dimer FEU measurement in platel

et poor plasma (mass/volume) - 20 

02:50         

 

                          Fibrin D-dimer FEU measurement in platelet poor plasma

 (mass/volume)           

0.82 ug/mL                              0.00-0.49        

 

                                        PROCALCITONIN (PCT) - 20 02:50    

     

 

                    PROCALCITONIN (PCT)           0.02 ng/mL           <0.10    

    

 

                                        Serum or plasma C reactive protein measu

rement (mass/volume) - 20 02:50   

      

 

                          Serum or plasma C reactive protein measurement (mass/v

olume)           0.62 

mg/dL                                   0.00-0.50        

 

                                        Erythrocyte sedimentation rate by vivienne

gren method - 20 02:50         

 

                    Erythrocyte sedimentation rate by westergren method         

  60 mm               0-

30        

 

                                        Complete urinalysis with reflex to cultu

re - 20 04:36         

 

                    Urine color determination           YELLOW              NRG 

       

 

                    Urine clarity determination           CLEAR               NR

G        

 

                    Urine pH measurement by test strip           7.5            

     5-9        

 

                    Specific gravity of urine by test strip           1.010     

          1.016-1.022  

      

 

                          Urine protein assay by test strip, semi-quantitative  

         NEGATIVE         

                                        NEGATIVE        

 

                    Urine glucose detection by automated test strip           NE

GATIVE            

NEGATIVE        

 

                          Erythrocytes detection in urine sediment by light micr

oscopy           1+       

                                        NEGATIVE        

 

                    Urine ketones detection by automated test strip           1+

                  NEGATIVE

        

 

                    Urine nitrite detection by test strip           NEGATIVE    

        NEGATIVE    

    

 

                    Urine total bilirubin detection by test strip           NEGA

TIVE            

NEGATIVE        

 

                          Urine urobilinogen measurement by automated test strip

 (mass/volume)           

0.2 mg/dL                               < = 1.0        

 

                    Urine leukocyte esterase detection by dipstick           NEG

ATIVE            

NEGATIVE        

 

                                        Automated urine sediment erythrocyte cou

nt by microscopy (number/high power 

field)                     [HPF]                    NRG        

 

                                        Automated urine sediment leukocyte count

 by microscopy (number/high power field)

                          NONE                      NRG        

 

                          Bacteria detection in urine sediment by light microsco

py           NEGATIVE     

                                        NRG        

 

                                        Squamous epithelial cells detection in u

rine sediment by light microscopy       

                          0-2                       NRG        

 

                          Crystals detection in urine sediment by light microsco

py           NONE         

                                        NRG        

 

                    Casts detection in urine sediment by light microscopy       

    NONE                

NRG        

 

                          Mucus detection in urine sediment by light microscopy 

          SMALL           

                                        NRG        

 

                    Complete urinalysis with reflex to culture           NO     

             NRG        

 

                                        Blood lactic acid measurement (moles/vol

ume) - 20 05:02         

 

                    Blood lactic acid measurement (moles/volume)           2.14 

mmol/L           

0.50-2.00        

 

                                        Bacterial blood culture - 20 05:02

         

 

                    QUANTITY OF GROWTH           Isolated            NRG        

 

                    Bacterial blood culture           [id number]            NRG   

     

 

                    SUSCEPTIBILITY           SEE COMMENT            NRG        

 

                                        Coronavirus SARS-CoV-2 SO  - 

0 05:30         

 

                    Coronavirus Ab [Units/volume] in Serum           Negative   

         Negative   

     

 

                                        Bacterial blood culture - 20 05:30

         

 

                    Bacterial blood culture           NG                  NRG   

     

 

                                        ADENOVIRUS DETECTION BY PCR - 20 0

5:30         

 

                    Adenovirus detection, CSF, PCR           Not Detected       

     Not Detected   

     

 

                                        PARAINFLUENZA VIRUS 1,2,3 PCR - 20

 05:30         

 

                          Serum or plasma aripiprazole measurement (mass/volume)

           Not Detected   

                                        Not Detected        

 

                    PARAINFLU 3 PCR           Not Detected            Not Detect

ed        

 

                                        Serum or plasma lactate measurement (mol

es/volume) - 20 08:10         

 

                          Serum or plasma lactate measurement (moles/volume)    

       1.88 mmol/L        

                                        0.50-2.00        

 

                                        Capillary blood glucose measurement by g

lucometer (mass/volume) - 20 11:15

         

 

                          Capillary blood glucose measurement by glucometer (mas

s/volume)           171 

mg/dL                                           

 

                                        Capillary blood glucose measurement by g

lucometer (mass/volume) - 20 15:34

         

 

                          Capillary blood glucose measurement by glucometer (mas

s/volume)           152 

mg/dL                                           

 

                                        Capillary blood glucose measurement by g

lucometer (mass/volume) - 20 21:17

         

 

                          Capillary blood glucose measurement by glucometer (mas

s/volume)           145 

mg/dL                                           

 

                                        Capillary blood glucose measurement by g

lucometer (mass/volume) - 20 05:57

         

 

                          Capillary blood glucose measurement by glucometer (mas

s/volume)           134 

mg/dL                                           

 

                                        Complete blood count (CBC) with automate

d white blood cell (WBC) differential - 

20 06:31         

 

                          Blood leukocytes automated count (number/volume)      

     4.5 10*3/uL          

                                        4.3-11.0        

 

                          Blood erythrocytes automated count (number/volume)    

       4.81 10*6/uL       

                                        4.35-5.85        

 

                    Venous blood hemoglobin measurement (mass/volume)           

14.1 g/dL           

13.3-17.7        

 

                    Blood hematocrit (volume fraction)           43 %           

     40-54        

 

                    Automated erythrocyte mean corpuscular volume           90 [

foz_us]           

80-99        

 

                                        Automated erythrocyte mean corpuscular h

emoglobin (mass per erythrocyte)        

                          29 pg                     25-34        

 

                                        Automated erythrocyte mean corpuscular h

emoglobin concentration measurement 

(mass/volume)             33 g/dL                   32-36        

 

                    Automated erythrocyte distribution width ratio           14.

8 %              10.0-

14.5        

 

                    Automated blood platelet count (count/volume)           197 

10*3/uL           

130-400        

 

                          Automated blood platelet mean volume measurement      

     9.1 [foz_us]         

                                        7.4-10.4        

 

                    Automated blood neutrophils/100 leukocytes           51 %   

             42-75       

 

 

                    Automated blood lymphocytes/100 leukocytes           32 %   

             12-44       

 

 

                    Blood monocytes/100 leukocytes           15 %               

 0-12        

 

                    Automated blood eosinophils/100 leukocytes           2 %    

             0-10        

 

                    Automated blood basophils/100 leukocytes           1 %      

           0-10        

 

                    Blood neutrophils automated count (number/volume)           

2.3 10*3            

1.8-7.8        

 

                    Blood lymphocytes automated count (number/volume)           

1.5 10*3            

1.0-4.0        

 

                    Blood monocytes automated count (number/volume)           0.

7 10*3            

0.0-1.0        

 

                    Automated eosinophil count           0.1 10*3/uL           0

.0-0.3        

 

                    Automated blood basophil count (count/volume)           0.0 

10*3/uL           

0.0-0.1        

 

                                        Comprehensive metabolic panel - 20

 06:31         

 

                          Serum or plasma sodium measurement (moles/volume)     

      141 mmol/L          

                                        135-145        

 

                          Serum or plasma potassium measurement (moles/volume)  

         4.1 mmol/L       

                                        3.6-5.0        

 

                          Serum or plasma chloride measurement (moles/volume)   

        107 mmol/L        

                                                

 

                    Carbon dioxide           22 mmol/L           21-32        

 

                          Serum or plasma anion gap determination (moles/volume)

           12 mmol/L      

                                        5-14        

 

                          Serum or plasma urea nitrogen measurement (mass/volume

)           5 mg/dL       

                                        7-18        

 

                          Serum or plasma creatinine measurement (mass/volume)  

         0.92 mg/dL       

                                        0.60-1.30        

 

                    Serum or plasma urea nitrogen/creatinine mass ratio         

  5                   NRG  

      

 

                                        Serum or plasma creatinine measurement w

ith calculation of estimated glomerular 

filtration rate           >                         NRG        

 

                    Serum or plasma glucose measurement (mass/volume)           

138 mg/dL           

        

 

                    Serum or plasma calcium measurement (mass/volume)           

8.9 mg/dL           

8.5-10.1        

 

                          Serum or plasma total bilirubin measurement (mass/volu

me)           0.6 mg/dL   

                                        0.1-1.0        

 

                                        Serum or plasma alkaline phosphatase zack

surement (enzymatic activity/volume)    

                          67 U/L                            

 

                                        Serum or plasma aspartate aminotransfera

se measurement (enzymatic 

activity/volume)           22 U/L                    5-34        

 

                                        Serum or plasma alanine aminotransferase

 measurement (enzymatic activity/volume)

                          10 U/L                    0-55        

 

                    Serum or plasma protein measurement (mass/volume)           

6.9 g/dL            

6.4-8.2        

 

                    Serum or plasma albumin measurement (mass/volume)           

3.4 g/dL            

3.2-4.5        

 

                    CALCIUM CORRECTED           9.4 mg/dL           8.5-10.1    

    

 

                                        PROCALCITONIN (PCT) - 20 06:31    

     

 

                    PROCALCITONIN (PCT)           0.03 ng/mL           <0.10    

    

 

                                        Capillary blood glucose measurement by g

lucometer (mass/volume) - 20 11:39

         

 

                          Capillary blood glucose measurement by glucometer (mas

s/volume)           144 

mg/dL                                           

 

                                        Arterial blood gas measurement - 

0 07:25         

 

                    Blood pCO2           35 mm[Hg]           35-45        

 

                    Blood pO2           78 mm[Hg]           79-93        

 

                          Arterial blood bicarbonate measurement (moles/volume) 

          24 mmol/L       

                                        23-27        

 

                    Arterial blood base excess by calculation           0.4 mmol

/L           

-2.5-2.5        

 

                    Arterial blood oxygen saturation measurement           97 % 

                   

    

 

                    * Inhaled oxygen flow rate           ROOM AIR            NRG

        

 

                          Arterial blood pH measurement with patient temperature

 correction           7.45

                                        7.37-7.43        

 

                          Arterial blood carbon dioxide, total measurement (mole

s/volume)           25.0 

mmol/L                                  21.0-31.0        

 

                    Body site           RIGHT RADIAL            NRG        

 

                          Assessment of wrist artery patency prior to arterial p

uncture           YES-POS 

                                        NRG        

 

                    Setting of ventilation mode           NO                  NR

G        

 

                    Measurement of body temperature           36.8              

  NRG        

 

                                        Complete blood count (CBC) with automate

d white blood cell (WBC) differential - 

20 07:35         

 

                          Blood leukocytes automated count (number/volume)      

     8.6 10*3/uL          

                                        4.3-11.0        

 

                          Blood erythrocytes automated count (number/volume)    

       4.88 10*6/uL       

                                        4.35-5.85        

 

                    Venous blood hemoglobin measurement (mass/volume)           

15.0 g/dL           

13.3-17.7        

 

                    Blood hematocrit (volume fraction)           44 %           

     40-54        

 

                    Automated erythrocyte mean corpuscular volume           89 [

foz_us]           

80-99        

 

                                        Automated erythrocyte mean corpuscular h

emoglobin (mass per erythrocyte)        

                          31 pg                     25-34        

 

                                        Automated erythrocyte mean corpuscular h

emoglobin concentration measurement 

(mass/volume)             34 g/dL                   32-36        

 

                    Automated erythrocyte distribution width ratio           15.

4 %              10.0-

14.5        

 

                    Automated blood platelet count (count/volume)           179 

10*3/uL           

130-400        

 

                          Automated blood platelet mean volume measurement      

     9.9 [foz_us]         

                                        7.4-10.4        

 

                    Automated blood neutrophils/100 leukocytes           71 %   

             42-75       

 

 

                    Automated blood lymphocytes/100 leukocytes           17 %   

             12-44       

 

 

                    Blood monocytes/100 leukocytes           11 %               

 0-12        

 

                    Automated blood eosinophils/100 leukocytes           1 %    

             0-10        

 

                    Automated blood basophils/100 leukocytes           0 %      

           0-10        

 

                    Blood neutrophils automated count (number/volume)           

6.1 10*3            

1.8-7.8        

 

                    Blood lymphocytes automated count (number/volume)           

1.5 10*3            

1.0-4.0        

 

                    Blood monocytes automated count (number/volume)           0.

9 10*3            

0.0-1.0        

 

                    Automated eosinophil count           0.1 10*3/uL           0

.0-0.3        

 

                    Automated blood basophil count (count/volume)           0.0 

10*3/uL           

0.0-0.1        

 

                                        Comprehensive metabolic panel - 20

 07:35         

 

                          Serum or plasma sodium measurement (moles/volume)     

      138 mmol/L          

                                        135-145        

 

                          Serum or plasma potassium measurement (moles/volume)  

         4.2 mmol/L       

                                        3.6-5.0        

 

                          Serum or plasma chloride measurement (moles/volume)   

        102 mmol/L        

                                                

 

                    Carbon dioxide           24 mmol/L           21-32        

 

                          Serum or plasma anion gap determination (moles/volume)

           12 mmol/L      

                                        5-14        

 

                          Serum or plasma urea nitrogen measurement (mass/volume

)           11 mg/dL      

                                        7-18        

 

                          Serum or plasma creatinine measurement (mass/volume)  

         0.86 mg/dL       

                                        0.60-1.30        

 

                    Serum or plasma urea nitrogen/creatinine mass ratio         

  13                  NRG 

       

 

                                        Serum or plasma creatinine measurement w

ith calculation of estimated glomerular 

filtration rate           >                         NRG        

 

                    Serum or plasma glucose measurement (mass/volume)           

122 mg/dL           

        

 

                    Serum or plasma calcium measurement (mass/volume)           

9.2 mg/dL           

8.5-10.1        

 

                          Serum or plasma total bilirubin measurement (mass/volu

me)           1.0 mg/dL   

                                        0.1-1.0        

 

                                        Serum or plasma alkaline phosphatase zack

surement (enzymatic activity/volume)    

                          77 U/L                            

 

                                        Serum or plasma aspartate aminotransfera

se measurement (enzymatic 

activity/volume)           22 U/L                    5-34        

 

                                        Serum or plasma alanine aminotransferase

 measurement (enzymatic activity/volume)

                          11 U/L                    0-55        

 

                    Serum or plasma protein measurement (mass/volume)           

8.0 g/dL            

6.4-8.2        

 

                    Serum or plasma albumin measurement (mass/volume)           

4.1 g/dL            

3.2-4.5        

 

                    CALCIUM CORRECTED           9.1 mg/dL           8.5-10.1    

    

 

                                        Blood lactic acid measurement (moles/vol

ume) - 20 07:35         

 

                    Blood lactic acid measurement (moles/volume)           1.21 

mmol/L           

0.50-2.00        

 

                                        Serum ragweed IgE antibody assay -  07:35         

 

                    Serum ragweed IgE antibody assay           230 U/L          

   125-220        

 

                                        PT panel in platelet poor plasma by coag

ulation assay - 20 07:35         

 

                          Prothrombin time (PT) in platelet poor plasma by coagu

lation assay           

14.3 s                                  12.2-14.7        

 

                          INR in platelet poor plasma or blood by coagulation as

say           1.1         

                                        0.8-1.4        

 

                                        Activated partial thromboplastin time (a

PTT) in platelet poor plasma 

bycoagulation assay - 20 07:35         

 

                                        Activated partial thromboplastin time (a

PTT) in platelet poor plasma 

bycoagulation assay           38 s                      24-35        

 

                                        Fibrin D-dimer FEU measurement in platel

et poor plasma (mass/volume) - 20 

07:35         

 

                          Fibrin D-dimer FEU measurement in platelet poor plasma

 (mass/volume)           

0.36 ug/mL                              0.00-0.49        

 

                                        PROCALCITONIN (PCT) - 20 07:35    

     

 

                    PROCALCITONIN (PCT)           0.01 ng/mL           <0.10    

    

 

                                        Erythrocyte sedimentation rate by vivienne

gren method - 20 07:35         

 

                    Erythrocyte sedimentation rate by westergren method         

  15 mm               0-

30        

 

                                        Serum or plasma troponin i.cardiac measu

rement (mass/volume) - 20 07:35   

      

 

                          Serum or plasma troponin i.cardiac measurement (mass/v

olume)           < ng/mL  

                                        <0.028        

 

                                        Serum or plasma C reactive protein measu

rement (mass/volume) - 20 07:35   

      

 

                          Serum or plasma C reactive protein measurement (mass/v

olume)           0.37 

mg/dL                                   0.00-0.50        

 

                                        [id number] - 20 07:35         

 

                    [id number]             < 0.30              0.80-2.00        

 

                                        Bacterial blood culture - 20 07:35

         

 

                    Bacterial blood culture           NG                  NRG   

     

 

                                        Coronavirus SARS-CoV-2 SO  - 

0 07:39         

 

                    Coronavirus Ab [Units/volume] in Serum           Negative   

         Negative   

     

 

                                        Bacterial blood culture - 20 07:52

         

 

                    Bacterial blood culture           NG                  NRG   

     

 

                                        Complete urinalysis with reflex to cultu

re - 20 09:45         

 

                    Urine color determination           YELLOW              NRG 

       

 

                    Urine clarity determination           CLEAR               NR

G        

 

                    Urine pH measurement by test strip           6.5            

     5-9        

 

                    Specific gravity of urine by test strip           1.015     

          1.016-1.022  

      

 

                          Urine protein assay by test strip, semi-quantitative  

         NEGATIVE         

                                        NEGATIVE        

 

                    Urine glucose detection by automated test strip           NE

GATIVE            

NEGATIVE        

 

                          Erythrocytes detection in urine sediment by light micr

oscopy           1+       

                                        NEGATIVE        

 

                    Urine ketones detection by automated test strip           1+

                  NEGATIVE

        

 

                    Urine nitrite detection by test strip           NEGATIVE    

        NEGATIVE    

    

 

                    Urine total bilirubin detection by test strip           NEGA

TIVE            

NEGATIVE        

 

                          Urine urobilinogen measurement by automated test strip

 (mass/volume)           

0.2 mg/dL                               < = 1.0        

 

                    Urine leukocyte esterase detection by dipstick           NEG

ATIVE            

NEGATIVE        

 

                                        Automated urine sediment erythrocyte cou

nt by microscopy (number/high power 

field)                     [HPF]                    NRG        

 

                                        Automated urine sediment leukocyte count

 by microscopy (number/high power field)

                          NONE                      NRG        

 

                          Bacteria detection in urine sediment by light microsco

py           NEGATIVE     

                                        NRG        

 

                                        Squamous epithelial cells detection in u

rine sediment by light microscopy       

                          NONE                      NRG        

 

                          Crystals detection in urine sediment by light microsco

py           NONE         

                                        NRG        

 

                    Casts detection in urine sediment by light microscopy       

    NONE                

NRG        

 

                          Mucus detection in urine sediment by light microscopy 

          NEGATIVE        

                                        NRG        

 

                    Complete urinalysis with reflex to culture           CULTURE

 PENDING            

NRG        

 

                                        Bacterial urine culture - 20 09:45

         

 

                    Bacterial urine culture           NG                  NRG   

     

 

                                        Complete blood count (CBC) with automate

d white blood cell (WBC) differential - 

20 05:50         

 

                          Blood leukocytes automated count (number/volume)      

     6.2 10*3/uL          

                                        4.3-11.0        

 

                          Blood erythrocytes automated count (number/volume)    

       4.13 10*6/uL       

                                        4.35-5.85        

 

                    Venous blood hemoglobin measurement (mass/volume)           

12.8 g/dL           

13.3-17.7        

 

                    Blood hematocrit (volume fraction)           38 %           

     40-54        

 

                    Automated erythrocyte mean corpuscular volume           91 [

foz_us]           

80-99        

 

                                        Automated erythrocyte mean corpuscular h

emoglobin (mass per erythrocyte)        

                          31 pg                     25-34        

 

                                        Automated erythrocyte mean corpuscular h

emoglobin concentration measurement 

(mass/volume)             34 g/dL                   32-36        

 

                    Automated erythrocyte distribution width ratio           15.

3 %              10.0-

14.5        

 

                    Automated blood platelet count (count/volume)           155 

10*3/uL           

130-400        

 

                          Automated blood platelet mean volume measurement      

     9.4 [foz_us]         

                                        7.4-10.4        

 

                    Automated blood neutrophils/100 leukocytes           64 %   

             42-75       

 

 

                    Automated blood lymphocytes/100 leukocytes           22 %   

             12-44       

 

 

                    Blood monocytes/100 leukocytes           12 %               

 0-12        

 

                    Automated blood eosinophils/100 leukocytes           2 %    

             0-10        

 

                    Automated blood basophils/100 leukocytes           1 %      

           0-10        

 

                    Blood neutrophils automated count (number/volume)           

3.9 10*3            

1.8-7.8        

 

                    Blood lymphocytes automated count (number/volume)           

1.4 10*3            

1.0-4.0        

 

                    Blood monocytes automated count (number/volume)           0.

8 10*3            

0.0-1.0        

 

                    Automated eosinophil count           0.1 10*3/uL           0

.0-0.3        

 

                    Automated blood basophil count (count/volume)           0.0 

10*3/uL           

0.0-0.1        

 

                                        Comprehensive metabolic panel - 20

 05:50         

 

                          Serum or plasma sodium measurement (moles/volume)     

      140 mmol/L          

                                        135-145        

 

                          Serum or plasma potassium measurement (moles/volume)  

         4.0 mmol/L       

                                        3.6-5.0        

 

                          Serum or plasma chloride measurement (moles/volume)   

        109 mmol/L        

                                                

 

                    Carbon dioxide           22 mmol/L           21-32        

 

                          Serum or plasma anion gap determination (moles/volume)

           9 mmol/L       

                                        5-14        

 

                          Serum or plasma urea nitrogen measurement (mass/volume

)           9 mg/dL       

                                        7-18        

 

                          Serum or plasma creatinine measurement (mass/volume)  

         0.81 mg/dL       

                                        0.60-1.30        

 

                    Serum or plasma urea nitrogen/creatinine mass ratio         

  11                  NRG 

       

 

                                        Serum or plasma creatinine measurement w

ith calculation of estimated glomerular 

filtration rate           >                         NRG        

 

                    Serum or plasma glucose measurement (mass/volume)           

126 mg/dL           

        

 

                    Serum or plasma calcium measurement (mass/volume)           

8.1 mg/dL           

8.5-10.1        

 

                          Serum or plasma total bilirubin measurement (mass/volu

me)           1.0 mg/dL   

                                        0.1-1.0        

 

                                        Serum or plasma alkaline phosphatase zack

surement (enzymatic activity/volume)    

                          66 U/L                            

 

                                        Serum or plasma aspartate aminotransfera

se measurement (enzymatic 

activity/volume)           17 U/L                    5-34        

 

                                        Serum or plasma alanine aminotransferase

 measurement (enzymatic activity/volume)

                          8 U/L                     0-55        

 

                    Serum or plasma protein measurement (mass/volume)           

6.5 g/dL            

6.4-8.2        

 

                    Serum or plasma albumin measurement (mass/volume)           

3.4 g/dL            

3.2-4.5        

 

                    CALCIUM CORRECTED           8.6 mg/dL           8.5-10.1    

    



                                                                                
                                                                                
                                                



Encounters

      



                ACCT No.           Visit Date/Time           Discharge          

 Status         

             Pt. Type           Provider           Facility           Loc./Unit 

          

Complaint        

 

                [id number]           2020 10:33:00           2020 13:35

:00           

DIS                 Outpatient           ARTHUR MURRAYCHOLO [facility]                    ER                                 

 

                [id number]           2020 11:32:00           2020 08:33

:00           

DIS             Outpatient           [facility]                         

      

                                                 

 

                [id number]           2020 10:16:00           2020 23:59

:00           

DIS             Outpatient           [facility]                         

      

                                                 

 

                [id number]           2020 13:34:00           2020 23:59

:00           

DIS             Outpatient           [facility]                            

      

                                                 

 

                [id number]           2019 11:40:00           2020 12:10

:00           

DIS                 Inpatient           OBI CORRAL [facility]                               

 

                [id number]           2019 15:43:00           2019 13:10

:00           

DIS                 Inpatient           [facility]

enter 

                          ICU                                

 

                [id number]           2019 12:09:00           2019 14:30

:00           

DIS                 Outpatient           ARTHUR MURRAY HCOLO           [facility]                    ER                                 

 

                [id number]           2019 16:10:00           2019 18:10:

00           DIS

                    Outpatient           Arnoldo Beltran           [facility]  

                          ER                                 

 

                [id number]           2019 15:22:00           2019 17:10:

00           DIS

                Outpatient           [facility]ANDA GALLEGOS                           

         

                                                 

 

                [id number]           05/15/2019 17:47:00           05/15/2019 18:30:

00           DIS

                Outpatient           [facility]                             

         

                                                 

 

                [id number]           2019 01:07:00           2019 02:24:

00           DIS

                    Outpatient           DONATO BOGGS           [facility]                    ER                                 

 

                [id number]           2019 11:06:00           2019 15:14:

00           DIS

                Outpatient           [facility]                            

         

                                                 

 

                [id number]           2019 18:45:00           2019 13:00:

00           DIS

                    Inpatient           [facility]

enter    

                          ICU                                

 

                [id number]           08/10/2018 12:44:00           2018 17:27:

00           DIS

                    Inpatient           [facility], Alesha           [facility]

enter    

                          [facility]                           

 

                [id number]           2018 19:04:00           08/10/2018 13:01:

00           DIS

                    Inpatient           [facility]

enter    

                          ICU                                

 

                [id number]           2018 16:04:00           2018 13:23:

00           DIS

                    Inpatient           [facility]

enter    

                          [facility]                           

 

             [id number]           2018 16:56:37                               

      Document 

Registration                                                                    

 

             [id number]           2019 00:55:00                               

      Document 

Registration                                                                    

 

                    [id number]           2020 09:30:00            13:10:00        

                DIS             Inpatient           AMPARO ALFARO, LUZ BARRERA          

 [facility]           4TH                       SEPSIS, PNA, ACUTE RESP

 DISTRESS   

     

 

                    [id number]           2020 05:00:00            13:15:00        

                DIS             Inpatient           FRANCESCO ALFARO, VASILE BARRERA          

 [facility]           4TH                       LLL PNA,NAUSEA/VOMITING

;CHRONIC A-

FIB;        

 

                    [id number]           2019 11:50:00            23:59:59        

                CLS             Outpatient           LUISANA REESE DO        

   [facility]           RAD                       CYST        

 

                    [id number]           2019 15:24:00            23:59:59        

                CLS             Outpatient           LUISANA REESE DO        

   [facility]           RAD                       BACK PAIN        

 

                [id number]           2019 14:00:00           2019 23:59:

59           CLS

                Outpatient           GONZALEZ, TAVIA J                          

 [facility] 

JAKYA                                             

 

             [id number]           2019 10:40:00                              

       Document

 Registration

## 2020-07-29 NOTE — DIAGNOSTIC IMAGING REPORT
EXAMINATION: Acute abdomen series at 11:58 a.m.



INDICATION: Abdominal pain.



FINDINGS: The accompanying erect PA chest shows the heart size to

be within normal limits and stable when compared to the prior

exam of 06/22/2020. The ectatic thoracic aorta seen previously is

again visualized and no different. The lungs are generally clear.

The mediastinum is not widened. The osseous structures are

intact.



Supine and erect views of the abdomen were obtained. There is gas

in both the large and small bowel in a nonspecific fashion. This

appearance is similar to the CT abdomen/pelvis exam of

05/29/2020. There is no mass or organomegaly appreciated. The

osseous structures are intact. The postsurgical changes involving

the lower lumbar spine seen previously are again evident and do

not appear to have changed significantly.



IMPRESSION: The bowel gas pattern is nonspecific. There is no

acute abnormality identified.



Dictated by: 



  Dictated on workstation # [id number]

## 2020-07-29 NOTE — ED GI
General


Source of Information:  Patient, EMS


Exam Limitations:  No Limitations





History of Present Illness


Date Seen by Provider:  Jul 29, 2020


Time Seen by Provider:  11:26


Initial Comments


75-year-old male presents with stomachache and "acid stomach" patient reports 

his been going on for a couple days. The hasn't wanted to drink myself last 

couple days. He denies any nausea vomiting, constipation, diarrhea, black tarry 

stools, bloody stool. Reports that sometimes he gets worse after he eats. He 

does not take anything for acid reflux. He does not have any cough, fever, 

chills. He reports he just doesn't feel well.





Allergies and Home Medications


Allergies


Coded Allergies:  


     No Known Drug Allergies (Unverified , 5/29/20)





Home Medications


Albuterol Sulfate 1 Puff Puff, 2 PUFF INH Q4H


   1 PUFF = 90 MCG 


   Prescribed by: NIC COBIAN on 5/30/20 1136


Amlodipine Besylate 5 Mg Tablet, 5 MG PO DAILY, (Reported)


Apixaban 5 Mg Tablet, 5 MG PO 1200,1800, (Reported)


   TAKES TWICE DAILY @ 1200&1800 


Cefdinir 300 Mg Capsule, 300 MG PO BID


   Prescribed by: VASILE MAYA on 6/22/20 1138


Digoxin 125 Mcg Tablet, 125 MCG PO DAILY, (Reported)


Finasteride 5 Mg Tablet, 5 MG PO 1200, (Reported)


Folic Acid 1 Mg Tablet, 1 MG PO DAILY, (Reported)


Gabapentin 300 Mg Capsule, 300 MG PO TID, (Reported)


Metoprolol Tartrate 50 Mg Tablet, 50 MG PO 1200,1800, (Reported)


   TAKES TWICE DAILY @1200&1800 


Multivitamin 1 Each Tablet, 1 EACH PO DAILY, (Reported)


Omega-3/Dha/Epa/Fish Oil 1 Each Capsule, 1 EACH PO DAILY, (Reported)


Tamsulosin HCl 0.4 Mg Cap, 0.4 MG PO DAILY, (Reported)


Venlafaxine HCl 75 Mg Cap.er.24h, 150 MG PO DAILY, (Reported)


Verapamil HCl 80 Mg Tablet, 80 MG PO 1200,1800, (Reported)


   TAKES TWICE DAILY @1200&1800 





Patient Home Medication List


Home Medication List Reviewed:  Yes





Review of Systems


Review of Systems


Constitutional:  No chills, No fever; malaise


Respiratory:  Denies Cough, Denies Shortness of Air


Gastrointestinal:  See HPI; Denies Nausea, Denies Vomiting


Genitourinary:  No Symptoms Reported


Musculoskeletal:  no symptoms reported


Skin:  no symptoms reported


Psychiatric/Neurological:  No Symptoms Reported


Endocrine:  No Symptoms Reported





Past Medical-Social-Family Hx


Past Med/Social Hx:  Reviewed Nursing Past Med/Soc Hx


Patient Social History


Type Used:  Pipe





Immunizations Up To Date


Tetanus Booster (TDap):  Less than 5yrs


PED Vaccines UTD:  Yes


Date of Pneumonia Vaccine:  Oct 29, 2019





Past Medical History


Surgeries:  Yes (BACK SURGERY-DR. REESE)


Orthopedic, Tonsillectomy


Respiratory:  No


Cardiac:  Yes


Atrial Fibrillation, Hypertension


Neurological:  No (? NEUROPATHY OR RESTLESS LEGS ? )


Genitourinary:  Yes (PROSTATE PROBLEMS PER MEDICATION LIST-FLOMAX AND 

FINASTERIDE)


Prostate Problems, Bladder Infection


Gastrointestinal:  No


Gastrointestinal Bleed


Musculoskeletal:  Yes (S/P BACK SURGERY)


Chronic Back Pain


Endocrine:  No


HEENT:  No


Cancer:  No


Psychosocial:  Yes (ON EFFEXOR--UNKNOWN DX )


Integumentary:  No


Blood Disorders:  No





Physical Exam


Vital Signs





Vital Signs - First Documented








 7/29/20





 11:20


 


Temp 37.0


 


Pulse 116


 


Resp 16


 


B/P (MAP) 153/120 (131)


 


Pulse Ox 95


 


O2 Delivery Room Air





Capillary Refill :


Height/Weight/BMI


Height: '"


Weight: lbs. oz. kg; 29.45 BMI


Method:


General Appearance:  WD/WN, no apparent distress


Neck:  full range of motion, supple


Respiratory:  lungs clear, normal breath sounds


Cardiovascular:  normal peripheral pulses, regular rate, rhythm


Gastrointestinal:  soft, tenderness (mild bilateral lower quadrant, suprapubic)


Extremities:  normal range of motion, normal inspection


Neurologic/Psychiatric:  CNs II-XII nml as tested, alert, normal mood/affect, 

oriented x 3


Skin:  normal color, warm/dry





Progress/Results/Core Measures


Results/Orders


Lab Results





Laboratory Tests








Test


 7/29/20


11:30 7/29/20


12:56 Range/Units


 


 


White Blood Count


 5.9 


 


 4.3-11.0


10^3/uL


 


Red Blood Count


 5.08 


 


 4.35-5.85


10^6/uL


 


Hemoglobin 15.7   13.3-17.7  G/DL


 


Hematocrit 44   40-54  %


 


Mean Corpuscular Volume 86   80-99  FL


 


Mean Corpuscular Hemoglobin 31   25-34  PG


 


Mean Corpuscular Hemoglobin


Concent 36 


 


 32-36  G/DL





 


Red Cell Distribution Width 14.4   10.0-14.5  %


 


Platelet Count


 138 


 


 130-400


10^3/uL


 


Mean Platelet Volume 9.7   7.4-10.4  FL


 


Neutrophils (%) (Auto) 66   42-75  %


 


Lymphocytes (%) (Auto) 14   12-44  %


 


Monocytes (%) (Auto) 19 H  0-12  %


 


Eosinophils (%) (Auto) 0   0-10  %


 


Basophils (%) (Auto) 0   0-10  %


 


Neutrophils # (Auto) 3.9   1.8-7.8  X 10^3


 


Lymphocytes # (Auto) 0.9 L  1.0-4.0  X 10^3


 


Monocytes # (Auto) 1.1 H  0.0-1.0  X 10^3


 


Eosinophils # (Auto)


 0.0 


 


 0.0-0.3


10^3/uL


 


Basophils # (Auto)


 0.0 


 


 0.0-0.1


10^3/uL


 


Neutrophils % (Manual) 71    %


 


Lymphocytes % (Manual) 14    %


 


Monocytes % (Manual) 14    %


 


Basophils % (Manual) 1    %


 


Blood Morphology Comment NORMAL    


 


Sodium Level 141   135-145  MMOL/L


 


Potassium Level 3.8   3.6-5.0  MMOL/L


 


Chloride Level 102     MMOL/L


 


Carbon Dioxide Level 26   21-32  MMOL/L


 


Anion Gap 13   5-14  MMOL/L


 


Blood Urea Nitrogen 6 L  7-18  MG/DL


 


Creatinine


 0.93 


 


 0.60-1.30


MG/DL


 


Estimat Glomerular Filtration


Rate > 60 


 


  





 


BUN/Creatinine Ratio 6    


 


Glucose Level 150 H    MG/DL


 


Calcium Level 8.4 L  8.5-10.1  MG/DL


 


Corrected Calcium 8.4 L  8.5-10.1  MG/DL


 


Total Bilirubin 1.7 H  0.1-1.0  MG/DL


 


Aspartate Amino Transf


(AST/SGOT) 30 


 


 5-34  U/L





 


Alanine Aminotransferase


(ALT/SGPT) 14 


 


 0-55  U/L





 


Alkaline Phosphatase 75     U/L


 


Total Protein 7.2   6.4-8.2  GM/DL


 


Albumin 4.0   3.2-4.5  GM/DL


 


Lipase 15   8-78  U/L


 


Urine Color  ORANGE   


 


Urine Clarity  CLEAR   


 


Urine pH  8.0  5-9  


 


Urine Specific Gravity  1.020  1.016-1.022  


 


Urine Protein  2+ H NEGATIVE  


 


Urine Glucose (UA)  NEGATIVE  NEGATIVE  


 


Urine Ketones  NEGATIVE  NEGATIVE  


 


Urine Nitrite  NEGATIVE  NEGATIVE  


 


Urine Bilirubin  NEGATIVE  NEGATIVE  


 


Urine Urobilinogen  2.0  < = 1.0  MG/DL


 


Urine Leukocyte Esterase  NEGATIVE  NEGATIVE  


 


Urine RBC (Auto)  TRACE-I  NEGATIVE  


 


Urine RBC  2-5 H  /HPF


 


Urine WBC  NONE   /HPF


 


Urine Crystals  NONE   /LPF


 


Urine Bacteria  NEGATIVE   /HPF


 


Urine Casts  PRESENT   /LPF


 


Urine Hyaline Casts  5-10 H  /LPF


 


Urine Mucus  SMALL H  /LPF


 


Urine Culture Indicated  NO   








My Orders





Orders - GOKUL OTOOLE DO


Cbc With Automated Diff (7/29/20 11:29)


Comprehensive Metabolic Panel (7/29/20 11:29)


Lipase (7/29/20 11:29)


Ua Culture If Indicated (7/29/20 11:29)


Acute Abd Series (7/29/20 11:29)


Famotidine Injection (Pepcid Injection) (7/29/20 11:29)


Manual Differential (7/29/20 11:30)





Vital Signs/I&O











 7/29/20





 11:20


 


Temp 37.0


 


Pulse 116


 


Resp 16


 


B/P (MAP) 153/120 (131)


 


Pulse Ox 95


 


O2 Delivery Room Air











Progress


Progress Note :  


   Time:  13:15


Progress Note


Patient's symptoms resolved following the Pepcid. Patient symptoms were very 

consistent with esophageal reflux. No acute finding on x-ray or labs. I 

encouraged patient to start Pepcid twice daily, follow-up with primary care 

provider. He will be discharged home in stable condition





Departure


Impression





   Primary Impression:  


   Gastroesophageal reflux


   Qualified Codes:  K21.9 - Gastro-esophageal reflux disease without 

   esophagitis


Disposition:  01 HOME, SELF-CARE


Condition:  Stable





Departure-Patient Inst.


Referrals:  


NAM THOMAS DO (PCP/Family)


Primary Care Physician


Patient Instructions:  Gastritis (DC), Acid Reflux and Gastroesophageal Reflux 

Disease in Adults





Add. Discharge Instructions:  


Start Pepcid twice daily as directed on package


Follow up with your primary care provider in 3-4 days











GOKUL OTOOLE DO               Jul 29, 2020 11:28

## 2020-09-05 ENCOUNTER — HOSPITAL ENCOUNTER (EMERGENCY)
Dept: HOSPITAL 75 - ER | Age: 75
LOS: 1 days | Discharge: HOME | End: 2020-09-06
Payer: MEDICARE

## 2020-09-05 VITALS — HEIGHT: 70 IN | WEIGHT: 199.96 LBS | BODY MASS INDEX: 28.63 KG/M2

## 2020-09-05 DIAGNOSIS — Z87.891: ICD-10-CM

## 2020-09-05 DIAGNOSIS — Z79.01: ICD-10-CM

## 2020-09-05 DIAGNOSIS — K59.00: Primary | ICD-10-CM

## 2020-09-05 DIAGNOSIS — I10: ICD-10-CM

## 2020-09-05 DIAGNOSIS — I48.91: ICD-10-CM

## 2020-09-05 PROCEDURE — 74022 RADEX COMPL AQT ABD SERIES: CPT

## 2020-09-06 VITALS — DIASTOLIC BLOOD PRESSURE: 99 MMHG | SYSTOLIC BLOOD PRESSURE: 160 MMHG

## 2020-09-06 NOTE — ED GI
General


Chief Complaint:  Abdominal/GI Problems


Stated Complaint:  ABD PAIN


Source of Information:  Patient (VERY LIMITED HISTORIAN, ESPECIALLY ABOUT PMH 

AND DOES NOT KNOW ANY OF HIS MEDICATIONS), Old Records





History of Present Illness


Date Seen by Provider:  Sep 5, 2020


Time Seen by Provider:  23:56


Initial Comments


PT ARRIVES VIA EMS FROM HOME IN [place] --LIVES ALONE


C/O CONSTIPATION, NO  BM X 4 DAYS


C/O ABDOMINAL PRESSURE, BUT NOT ACTUALLY ANY PAIN


NO NAUSEA/VOMITING--HAS BEEN EATING AND DRINKING NORMALLY, INCLUDING THIS 

EVENING


NO URINARY SYMPTOMS, VOIDING A NORMAL AMOUNT


NO FEVER





NO RELIEF WITH 1 SUPPOSITORY AT 1800 THIS EVENING


LATER STATES HE TOOK AN UNKNOWN OTC LIQUID MEDICATION TO HELP--HAS NO IDEA WHAT 

HE TOOK, THINKS HE TOOK IT AT 1800 AS WELL





PT CLAIMS NO HISTORY OF SIMILAR


NO ABDOMINAL SURGERIES


STATES HE IS NOT ON ANY PAIN MEDICATIONS





HAS AD 4 VISITS HERE SINCE HIS FIRST VISIT IN MAY. PT IS ALSO KNOWN TO [facility] 

[facility]





PCP: DR. THOMAS





Allergies and Home Medications


Allergies


Coded Allergies:  


     No Known Drug Allergies (Unverified , 5/29/20)





Home Medications


Albuterol Sulfate 1 Puff Puff, 2 PUFF INH Q4H


   1 PUFF = 90 MCG 


   Prescribed by: NIC COBIAN on 5/30/20 1136


Amlodipine Besylate 5 Mg Tablet, 5 MG PO DAILY, (Reported)


Apixaban 5 Mg Tablet, 5 MG PO 1200,1800, (Reported)


   TAKES TWICE DAILY @ 1200&1800 


Cefdinir 300 Mg Capsule, 300 MG PO BID


   Prescribed by: VASILE MAYA on 6/22/20 1138


Digoxin 125 Mcg Tablet, 125 MCG PO DAILY, (Reported)


Finasteride 5 Mg Tablet, 5 MG PO 1200, (Reported)


Folic Acid 1 Mg Tablet, 1 MG PO DAILY, (Reported)


Gabapentin 300 Mg Capsule, 300 MG PO TID, (Reported)


Metoprolol Tartrate 50 Mg Tablet, 50 MG PO 1200,1800, (Reported)


   TAKES TWICE DAILY @1200&1800 


Multivitamin 1 Each Tablet, 1 EACH PO DAILY, (Reported)


Omega-3/Dha/Epa/Fish Oil 1 Each Capsule, 1 EACH PO DAILY, (Reported)


Tamsulosin HCl 0.4 Mg Cap, 0.4 MG PO DAILY, (Reported)


Venlafaxine HCl 75 Mg Cap.er.24h, 150 MG PO DAILY, (Reported)


Verapamil HCl 80 Mg Tablet, 80 MG PO 1200,1800, (Reported)


   TAKES TWICE DAILY @1200&1800 





Patient Home Medication List


Home Medication List Reviewed:  Yes





Review of Systems


Review of Systems


Constitutional:  no symptoms reported; No chills, No fever


Respiratory:  No Symptoms Reported


Cardiovascular:  No Symptoms Reported


Gastrointestinal:  See HPI; Denies Abdominal Pain; Constipated; Denies Diarrhea,

Denies Nausea, Denies Poor Appetite, Denies Vomiting


Genitourinary:  No Symptoms Reported


Musculoskeletal:  no symptoms reported


Skin:  no symptoms reported


Psychiatric/Neurological:  No Symptoms Reported


Endocrine:  No Symptoms Reported


Hematologic/Lymphatic:  No Symptoms Reported





Past Medical-Social-Family Hx


Past Med/Social Hx:  Reviewed and Corrections made


Patient Social History


Alcohol Use:  Regular Use


Alcohol Beverage of Choice:  [place]


Recreational Drug Use:  No


Smoking Status:  Former Smoker


Type Used:  Pipe





Immunizations Up To Date


Tetanus Booster (TDap):  Less than 5yrs


PED Vaccines UTD:  Yes


Date of Pneumonia Vaccine:  Oct 29, 2019





Past Medical History


Surgeries:  Yes (BACK SURGERY-DR. REESE)


Orthopedic, Tonsillectomy


Respiratory:  No


Cardiac:  Yes


Atrial Fibrillation, Hypertension


Neurological:  No (? NEUROPATHY OR RESTLESS LEGS ? )


Genitourinary:  Yes (PROSTATE PROBLEMS PER MEDICATION LIST-FLOMAX AND 

FINASTERIDE)


Prostate Problems, Bladder Infection


Gastrointestinal:  No


Musculoskeletal:  Yes (S/P BACK SURGERY WITH HARDWARE IN PLACE)


Chronic Back Pain


Endocrine:  No


HEENT:  No


Cancer:  No


Psychosocial:  Yes (ON EFFEXOR--UNKNOWN DX )


Integumentary:  No


Blood Disorders:  No





Family Medical History





SOCIAL HISTORY: 


-DRINKS "AT LEAST 2 HIGHBALLS EVERY NIGHT" 


-DENIES DRUG USE


-SMOKED A PIPE-STATES HE QUIT IN 1975





Physical Exam


Vital Signs





Vital Signs - First Documented








 9/5/20 9/6/20





 23:55 01:08


 


Temp 36.7 


 


Pulse 102 


 


Resp 20 


 


B/P (MAP) 142/103 (116) 


 


Pulse Ox  96


 


O2 Delivery  Room Air





Capillary Refill :


Height/Weight/BMI


Height: '"


Weight: lbs. oz. kg; 28.00 BMI


Method:


General Appearance:  WD/WN, no apparent distress


Respiratory:  normal breath sounds, no respiratory distress, no accessory muscle

use


Cardiovascular:  no murmur, irregularly irregular


Gastrointestinal:  non tender, soft (ROTUND BUT SOFT), abnormal bowel sounds 

(HYPERACTIVE ); No guarding, No rebound, No tenderness, No hernia, No mass


Extremities:  normal range of motion, non-tender, pedal edema (TTRACE 

BILATERALLY)


Back:  no CVA tenderness


Neurologic/Psychiatric:  CNs II-XII nml as tested, no motor/sensory deficits, 

alert, normal mood/affect, oriented x 3 (BUT POOR MEMORY)


Skin:  normal color, warm/dry





Progress/Results/Core Measures


Results/Orders


My Orders





Orders - ROXIE CARD DO


Acute Abd Series (9/6/20 00:24)





Vital Signs/I&O











 9/5/20 9/6/20





 23:55 01:08


 


Temp 36.7 36.9


 


Pulse 102 82


 


Resp 20 20


 


B/P (MAP) 142/103 (116) 160/99


 


Pulse Ox  96


 


O2 Delivery  Room Air











Progress


Progress Note :  


Progress Note


LITERALLY AS SOON AS PT ARRIVES, HE C/O MUCH PRESSURE IN RECTAL AREA AND FEELS 

STRONG URGE TO HAVE A BM AND STATES "IT FEELS LIKE IT COMING OUT NOW" 


PT TAKEN TO BATHROOM AND HAD A LARGE BM SHORTLY AFTER ARRIVAL AND SYMPTOMS ARE 

RESOLVED. 





ADVISED TO TAKE MIRALAX DAILY, PT STATES HE HAS SOME AT HOME, BUT HAS NOT BEEN 

TAKING IT--"DIDN'T THINK I NEEDED IT" IS HIS EXPLANATION--STRESSED THE 

IMPORTANCE OF TAKING DAILY TO PREVENT THIS PROBLEM





Diagnostic Imaging





Comments


ABDOMEN XRAYS--NON-SPECIFIC BOWEL GAS PATTERN, NO OBSTRUCTION, PENDING 

RADIOLOGIST REVIEW


   Reviewed:  Reviewed by Me





Departure


Impression





   Primary Impression:  


   Constipation


Disposition:  01 HOME, SELF-CARE


Condition:  Improved





Departure-Patient Inst.


Referrals:  


NAM THOMAS DO (PCP/Family)


Primary Care Physician


Patient Instructions:  Constipation, Adult (DC)





Add. Discharge Instructions:  


INCREASE YOUR WATER AND FIBER INTAKE





TAKE MIRALAX DAILY





USE DULCOLAX SUPPOSITORIES AND FLEET'S ENEMAS AS NEEDED





FOLLOW UP WITH DR. THOMAS ON TUESDAY IF NO BETTER





All discharge instructions reviewed with patient and/or family. Voiced 

understanding.











ROXIE CARD DO                  Sep 6, 2020 00:11

## 2020-09-06 NOTE — DIAGNOSTIC IMAGING REPORT
INDICATION: Abdominal pain



COMPARISON: 07/29/2020



FINDINGS: Supine and upright views of the abdomen show several

air-fluid levels scattered throughout the abdomen. Despite this,

small bowel loops are nondistended. There is no large collection

of free intraperitoneal air. No abnormal extraosseous

calcifications are seen. Bony and soft tissue structures are

within normal limits. No organomegaly is identified.



Accompanying upright chest shows normal heart size and pulmonary

vascularity. The lungs are hypoinflated, but are otherwise clear.

The mediastinum is normal in appearance. 



IMPRESSION:   

1. Multiple air-fluid levels scattered throughout otherwise

nondistended loops of small bowel. Findings are nonspecific, but

can be seen with diarrhea related to underlying enteritis.

Clinical correlation is recommended.  

2.  Normal chest. No pneumonia or pulmonary edema.



Dictated by: 



  Dictated on workstation # [id number]

## 2020-09-11 ENCOUNTER — HOSPITAL ENCOUNTER (EMERGENCY)
Dept: HOSPITAL 75 - ER | Age: 75
Discharge: HOME | End: 2020-09-11
Payer: MEDICARE

## 2020-09-11 ENCOUNTER — HOSPITAL ENCOUNTER (EMERGENCY)
Dept: HOSPITAL 75 - ER | Age: 75
End: 2020-09-11
Payer: MEDICARE

## 2020-09-11 VITALS — WEIGHT: 216.05 LBS | HEIGHT: 69.69 IN | BODY MASS INDEX: 31.28 KG/M2

## 2020-09-11 VITALS — SYSTOLIC BLOOD PRESSURE: 151 MMHG | DIASTOLIC BLOOD PRESSURE: 107 MMHG

## 2020-09-11 VITALS — DIASTOLIC BLOOD PRESSURE: 78 MMHG | SYSTOLIC BLOOD PRESSURE: 121 MMHG

## 2020-09-11 VITALS — WEIGHT: 174.83 LBS | HEIGHT: 68.98 IN | BODY MASS INDEX: 25.89 KG/M2

## 2020-09-11 DIAGNOSIS — F10.97: Primary | ICD-10-CM

## 2020-09-11 DIAGNOSIS — J02.9: Primary | ICD-10-CM

## 2020-09-11 DIAGNOSIS — I48.91: ICD-10-CM

## 2020-09-11 DIAGNOSIS — F03.90: ICD-10-CM

## 2020-09-11 DIAGNOSIS — Z79.01: ICD-10-CM

## 2020-09-11 DIAGNOSIS — F17.200: ICD-10-CM

## 2020-09-11 DIAGNOSIS — I10: ICD-10-CM

## 2020-09-11 LAB
ALBUMIN SERPL-MCNC: 4 GM/DL (ref 3.2–4.5)
ALP SERPL-CCNC: 65 U/L (ref 40–136)
ALT SERPL-CCNC: 16 U/L (ref 0–55)
AMMONIA PLAS-SCNC: 29 UMOL/L (ref 11–32)
APTT PPP: YELLOW S
BACTERIA #/AREA URNS HPF: NEGATIVE /HPF
BASOPHILS # BLD AUTO: 0 10^3/UL (ref 0–0.1)
BASOPHILS NFR BLD AUTO: 1 % (ref 0–10)
BILIRUB SERPL-MCNC: 0.4 MG/DL (ref 0.1–1)
BILIRUB UR QL STRIP: NEGATIVE
BUN/CREAT SERPL: 7
CALCIUM SERPL-MCNC: 8.6 MG/DL (ref 8.5–10.1)
CHLORIDE SERPL-SCNC: 105 MMOL/L (ref 98–107)
CO2 SERPL-SCNC: 23 MMOL/L (ref 21–32)
CREAT SERPL-MCNC: 0.99 MG/DL (ref 0.6–1.3)
EOSINOPHIL # BLD AUTO: 0.1 10^3/UL (ref 0–0.3)
EOSINOPHIL NFR BLD AUTO: 3 % (ref 0–10)
FIBRINOGEN PPP-MCNC: CLEAR MG/DL
GFR SERPLBLD BASED ON 1.73 SQ M-ARVRAT: > 60 ML/MIN
GLUCOSE SERPL-MCNC: 163 MG/DL (ref 70–105)
GLUCOSE UR STRIP-MCNC: NEGATIVE MG/DL
HCT VFR BLD CALC: 43 % (ref 40–54)
HGB BLD-MCNC: 15 G/DL (ref 13.3–17.7)
INR PPP: 1.1 (ref 0.8–1.4)
KETONES UR QL STRIP: NEGATIVE
LEUKOCYTE ESTERASE UR QL STRIP: NEGATIVE
LIPASE SERPL-CCNC: 31 U/L (ref 8–78)
LYMPHOCYTES # BLD AUTO: 1.2 X 10^3 (ref 1–4)
LYMPHOCYTES NFR BLD AUTO: 24 % (ref 12–44)
MANUAL DIFFERENTIAL PERFORMED BLD QL: NO
MCH RBC QN AUTO: 31 PG (ref 25–34)
MCHC RBC AUTO-ENTMCNC: 35 G/DL (ref 32–36)
MCV RBC AUTO: 89 FL (ref 80–99)
MONOCYTES # BLD AUTO: 0.7 X 10^3 (ref 0–1)
MONOCYTES NFR BLD AUTO: 13 % (ref 0–12)
NEUTROPHILS # BLD AUTO: 3.1 X 10^3 (ref 1.8–7.8)
NEUTROPHILS NFR BLD AUTO: 60 % (ref 42–75)
NITRITE UR QL STRIP: NEGATIVE
PH UR STRIP: 6 [PH] (ref 5–9)
PLATELET # BLD: 162 10^3/UL (ref 130–400)
PMV BLD AUTO: 9.2 FL (ref 7.4–10.4)
POTASSIUM SERPL-SCNC: 3.8 MMOL/L (ref 3.6–5)
PROT SERPL-MCNC: 7.4 GM/DL (ref 6.4–8.2)
PROT UR QL STRIP: NEGATIVE
PROTHROMBIN TIME: 14.2 SEC (ref 12.2–14.7)
RBC #/AREA URNS HPF: (no result) /HPF
SODIUM SERPL-SCNC: 143 MMOL/L (ref 135–145)
SP GR UR STRIP: 1.01 (ref 1.02–1.02)
SQUAMOUS #/AREA URNS HPF: (no result) /HPF
WBC # BLD AUTO: 5.2 10^3/UL (ref 4.3–11)
WBC #/AREA URNS HPF: (no result) /HPF

## 2020-09-11 PROCEDURE — 74177 CT ABD & PELVIS W/CONTRAST: CPT

## 2020-09-11 PROCEDURE — 99284 EMERGENCY DEPT VISIT MOD MDM: CPT

## 2020-09-11 PROCEDURE — 87430 STREP A AG IA: CPT

## 2020-09-11 PROCEDURE — 80053 COMPREHEN METABOLIC PANEL: CPT

## 2020-09-11 PROCEDURE — 70450 CT HEAD/BRAIN W/O DYE: CPT

## 2020-09-11 PROCEDURE — 82140 ASSAY OF AMMONIA: CPT

## 2020-09-11 PROCEDURE — 71045 X-RAY EXAM CHEST 1 VIEW: CPT

## 2020-09-11 PROCEDURE — 36415 COLL VENOUS BLD VENIPUNCTURE: CPT

## 2020-09-11 PROCEDURE — 86141 C-REACTIVE PROTEIN HS: CPT

## 2020-09-11 PROCEDURE — 80320 DRUG SCREEN QUANTALCOHOLS: CPT

## 2020-09-11 PROCEDURE — 81000 URINALYSIS NONAUTO W/SCOPE: CPT

## 2020-09-11 PROCEDURE — 85025 COMPLETE CBC W/AUTO DIFF WBC: CPT

## 2020-09-11 PROCEDURE — 85610 PROTHROMBIN TIME: CPT

## 2020-09-11 PROCEDURE — 83690 ASSAY OF LIPASE: CPT

## 2020-09-11 NOTE — DIAGNOSTIC IMAGING REPORT
INDICATION: Nausea and vomiting and altered mental status.



TIME OF EXAM: 2:13 PM



CORRELATION is made with prior chest from 09/06/2020.



Heart size is stable. The lungs are clear. The pulmonary

vascularity is normal. No infiltrate, effusion or pneumothorax is

detected.



IMPRESSION: No acute cardiopulmonary process is detected.



Dictated by: 



  Dictated on workstation # [id number]

## 2020-09-11 NOTE — ED ABDOMINAL PAIN
General


Stated Complaint:  N/V


Source of Information:  Patient


Exam Limitations:  No Limitations





History of Present Illness


Date Seen by Provider:  Sep 11, 2020


Time Seen by Provider:  13:20


Initial Comments


The patient presents to the ER by EMS with chief complaint of nausea and dry 

heaving. He is having some generalized abdominal discomfort. He does not recall 

being here earlier and this day nor does he recall this examiner. He has a 

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 He denies having any alcohol today 

but he also denies having eaten or drank anything today. On his prior visit this

morning we did provide him with breakfast and he had a cup of fruit, cottage 

cheese and several cups of coffee. He says he's having some nausea now but no 

fevers chills. He has generalized abdominal discomfort. He cannot tell me a 

surgical history. He lives alone with several family members around that look in

on him. He refused all care this morning except for a rapid strep screen which 

was negative. He denies having difficulty passing gas. He does not recall his 

last bowel movement.





Allergies and Home Medications


Allergies


Coded Allergies:  


     No Known Drug Allergies (Unverified , 20)





Home Medications


Albuterol Sulfate 1 Puff Puff, 2 PUFF INH Q4H


   1 PUFF = 90 MCG 


   Prescribed by: NIC COBIAN on 20 1136


Amlodipine Besylate 5 Mg Tablet, 5 MG PO DAILY, (Reported)


Apixaban 5 Mg Tablet, 5 MG PO 1200,1800, (Reported)


   TAKES TWICE DAILY @ 1200&1800 


Cefdinir 300 Mg Capsule, 300 MG PO BID


   Prescribed by: VASILE MAYA on 20 1138


Digoxin 125 Mcg Tablet, 125 MCG PO DAILY, (Reported)


Finasteride 5 Mg Tablet, 5 MG PO 1200, (Reported)


Folic Acid 1 Mg Tablet, 1 MG PO DAILY, (Reported)


Gabapentin 300 Mg Capsule, 300 MG PO TID, (Reported)


Metoprolol Tartrate 50 Mg Tablet, 50 MG PO 1200,1800, (Reported)


   TAKES TWICE DAILY @1200&1800 


Multivitamin 1 Each Tablet, 1 EACH PO DAILY, (Reported)


Omega-3/Dha/Epa/Fish Oil 1 Each Capsule, 1 EACH PO DAILY, (Reported)


Tamsulosin HCl 0.4 Mg Cap, 0.4 MG PO DAILY, (Reported)


Venlafaxine HCl 75 Mg Cap.er.24h, 150 MG PO DAILY, (Reported)


Verapamil HCl 80 Mg Tablet, 80 MG PO 1200,1800, (Reported)


   TAKES TWICE DAILY @1200&1800 





Patient Home Medication List


Home Medication List Reviewed:  Yes





Review of Systems


Review of Systems


Constitutional:  No chills, No diaphoresis


EENTM:  No Blurred Vision, No Double Vision


Respiratory:  Denies Cough, Denies Shortness of Air


Cardiovascular:  Denies Chest Pain, Denies Edema


Gastrointestinal:  See HPI, Abdominal Pain; Denies Constipated, Denies Diarrhea;

Nausea; Denies Poor Fluid Intake, Denies Vomiting


Genitourinary:  Denies Burning, Denies Discharge


Musculoskeletal:  No back pain, No joint pain





All Other Systems Reviewed


Negative Unless Noted:  Yes





Past Medical-Social-Family Hx


Patient Social History


Alcohol Use:  Regular Use


Alcohol Beverage of Choice:  Cheap Liquor


Recreational Drug Use:  No


Smoking Status:  Current Everyday Smoker


Type Used:  Pipe





Immunizations Up To Date


Tetanus Booster (TDap):  Less than 5yrs


PED Vaccines UTD:  Yes


Date of Pneumonia Vaccine:  Oct 29, 2019





Past Medical History


Surgeries:  Yes (BACK SURGERY-DR. REESE)


Orthopedic, Tonsillectomy


Respiratory:  No


Cardiac:  Yes


Atrial Fibrillation, Hypertension


Neurological:  No (? NEUROPATHY OR RESTLESS LEGS ? )


Genitourinary:  Yes (PROSTATE PROBLEMS PER MEDICATION LIST-FLOMAX AND 

FINASTERIDE)


Prostate Problems, Bladder Infection


Gastrointestinal:  No


Musculoskeletal:  Yes (S/P BACK SURGERY WITH HARDWARE IN PLACE)


Chronic Back Pain


Endocrine:  No


HEENT:  No


Cancer:  No


Psychosocial:  Yes (ON EFFEXOR--UNKNOWN DX )


Integumentary:  No


Blood Disorders:  No





Family Medical History





SOCIAL HISTORY: 


-DRINKS "AT LEAST 2 HIGHBALLS EVERY NIGHT" 


-DENIES DRUG USE


-SMOKED A PIPE-STATES HE QUIT IN 





Physical Exam


Vital Signs





Vital Signs - First Documented








 20





 13:25


 


Temp 37.4


 


Pulse 86


 


Resp 14


 


B/P (MAP) 150/114 (126)


 


Pulse Ox 98


 


O2 Delivery Room Air





Capillary Refill :


Height/Weight/BMI


Height: '"


Weight: lbs. oz. kg; 31.00 BMI


Method:


General Appearance:  WD/WN, no apparent distress


HEENT:  PERRL/EOMI, normal ENT inspection, pharynx normal


Neck:  full range of motion, supple, normal inspection


Respiratory:  lungs clear, normal breath sounds, no respiratory distress, no 

accessory muscle use


Cardiovascular:  normal peripheral pulses, regular rate, rhythm


Peripheral Pulses:  2+ Radial Pulses (R), 2+ Radial Pulses (L)


Gastrointestinal:  normal bowel sounds, non tender, soft, no organomegaly


Extremities:  non-tender, normal inspection


Neurologic/Psychiatric:  alert, oriented x 3


Skin:  normal color, warm/dry





Progress/Results/Core Measures


Results/Orders


Lab Results





Laboratory Tests








Test


 20


13:40 20


14:53 Range/Units


 


 


White Blood Count


 5.2 


 


 4.3-11.0


10^3/uL


 


Red Blood Count


 4.86 


 


 4.35-5.85


10^6/uL


 


Hemoglobin 15.0   13.3-17.7  G/DL


 


Hematocrit 43   40-54  %


 


Mean Corpuscular Volume 89   80-99  FL


 


Mean Corpuscular Hemoglobin 31   25-34  PG


 


Mean Corpuscular Hemoglobin


Concent 35 


 


 32-36  G/DL





 


Red Cell Distribution Width 13.7   10.0-14.5  %


 


Platelet Count


 162 


 


 130-400


10^3/uL


 


Mean Platelet Volume 9.2   7.4-10.4  FL


 


Neutrophils (%) (Auto) 60   42-75  %


 


Lymphocytes (%) (Auto) 24   12-44  %


 


Monocytes (%) (Auto) 13 H  0-12  %


 


Eosinophils (%) (Auto) 3   0-10  %


 


Basophils (%) (Auto) 1   0-10  %


 


Neutrophils # (Auto) 3.1   1.8-7.8  X 10^3


 


Lymphocytes # (Auto) 1.2   1.0-4.0  X 10^3


 


Monocytes # (Auto) 0.7   0.0-1.0  X 10^3


 


Eosinophils # (Auto)


 0.1 


 


 0.0-0.3


10^3/uL


 


Basophils # (Auto)


 0.0 


 


 0.0-0.1


10^3/uL


 


Prothrombin Time 14.2   12.2-14.7  SEC


 


INR Comment 1.1   0.8-1.4  


 


Sodium Level 143   135-145  MMOL/L


 


Potassium Level 3.8   3.6-5.0  MMOL/L


 


Chloride Level 105     MMOL/L


 


Carbon Dioxide Level 23   21-32  MMOL/L


 


Anion Gap 15 H  5-14  MMOL/L


 


Blood Urea Nitrogen 7   7-18  MG/DL


 


Creatinine


 0.99 


 


 0.60-1.30


MG/DL


 


Estimat Glomerular Filtration


Rate > 60 


 


  





 


BUN/Creatinine Ratio 7    


 


Glucose Level 163 H    MG/DL


 


Calcium Level 8.6   8.5-10.1  MG/DL


 


Corrected Calcium 8.6   8.5-10.1  MG/DL


 


Total Bilirubin 0.4   0.1-1.0  MG/DL


 


Aspartate Amino Transf


(AST/SGOT) 48 H


 


 5-34  U/L





 


Alanine Aminotransferase


(ALT/SGPT) 16 


 


 0-55  U/L





 


Alkaline Phosphatase 65     U/L


 


Ammonia 29   11-32  UMOL/L


 


C-Reactive Protein High


Sensitivity 0.09 


 


 0.00-0.50


MG/DL


 


Total Protein 7.4   6.4-8.2  GM/DL


 


Albumin 4.0   3.2-4.5  GM/DL


 


Lipase 31   8-78  U/L


 


Serum Alcohol 148 H  <10  MG/DL


 


Urine Color  YELLOW   


 


Urine Clarity  CLEAR   


 


Urine pH  6.0  5-9  


 


Urine Specific Gravity  1.015 L 1.016-1.022  


 


Urine Protein  NEGATIVE  NEGATIVE  


 


Urine Glucose (UA)  NEGATIVE  NEGATIVE  


 


Urine Ketones  NEGATIVE  NEGATIVE  


 


Urine Nitrite  NEGATIVE  NEGATIVE  


 


Urine Bilirubin  NEGATIVE  NEGATIVE  


 


Urine Urobilinogen  0.2  < = 1.0  MG/DL


 


Urine Leukocyte Esterase  NEGATIVE  NEGATIVE  


 


Urine RBC (Auto)  TRACE-I  NEGATIVE  


 


Urine RBC  RARE   /HPF


 


Urine WBC  RARE   /HPF


 


Urine Squamous Epithelial


Cells 


 RARE 


  /HPF





 


Urine Crystals  NONE   /LPF


 


Urine Bacteria  NEGATIVE   /HPF


 


Urine Casts  NONE   /LPF


 


Urine Mucus  NEGATIVE   /LPF


 


Urine Culture Indicated  NO   








My Orders





Orders - BRENDEN DEE


Ed Iv/Invasive Line Start (20 13:30)


Lactated Ringers (Lr 1000 Ml Iv Solution (20 13:30)


Ondansetron Injection (Zofran Injectio (20 13:30)


Pantoprazole Injection (Protonix Injecti (20 13:30)


Cbc With Automated Diff (20 13:30)


Comprehensive Metabolic Panel (20 13:30)


Hs C Reactive Protein (20 13:30)


Lipase (20 13:30)


Ua Culture If Indicated (20 13:30)


Alcohol (20 13:30)


Ct Abdomen/Pelvis W (20 13:30)


Ct Head Wo (20 13:30)


Chest 1 View, Ap/Pa Only (20 13:30)


Protime With Inr (20 13:33)


Ammonia (20 13:33)


Thiamine Injection (Vitamin B-1 Injectio (20 14:00)


Folic Acid Injection (Folic Acid Injecti (20 14:00)


Iohexol Injection (Omnipaque 350 Mg/Ml 1 (20 14:15)


Received Contrast (Hold Metformin- Contr (20 14:15)


Sodium Chloride Flush (Catheter Flush Sy (20 14:15)


Ns (Ivpb) (Sodium Chloride 0.9% Ivpb Bag (20 14:15)





Medications Given in ED





Current Medications








 Medications  Dose


 Ordered  Sig/Ortiz


 Route  Start Time


 Stop Time Status Last Admin


Dose Admin


 


 Folic Acid  1 mg  ONCE  ONCE


 IV  20 14:00


 20 14:01 DC 20 15:15


1 MG


 


 Iohexol  100 ml  ONCE  ONCE


 IV  20 14:15


 20 14:17 DC 20 14:45


100 ML


 


 Lactated Ringer's  1,000 ml @ 


 0 mls/hr  Q0M ONCE


 IV  20 13:30


 20 13:33 DC 20 14:01


1,000 MLS/HR


 


 Ondansetron HCl  4 mg  ONCE  ONCE


 IVP  20 13:30


 20 13:33 DC 20 14:01


4 MG


 


 Pantoprazole  40 mg  ONCE  ONCE


 IV  20 13:30


 20 13:33 DC 20 14:01


40 MG


 


 Sodium Chloride  10 ml  AS NEEDED  PRN


 IV  20 14:15


    20 14:46


10 ML


 


 Sodium Chloride  100 ml  ONCE  ONCE


 IV  20 14:15


 20 14:17 DC 20 14:46


80 ML


 


 Thiamine HCl  100 mg  ONCE  ONCE


 IV  20 14:00


 20 14:01 DC 20 14:02


100 MG








Vital Signs/I&O











 20





 13:25


 


Temp 37.4


 


Pulse 86


 


Resp 14


 


B/P (MAP) 150/114 (126)


 


Pulse Ox 98


 


O2 Delivery Room Air











Progress


Progress Note #1:  


   Time:  13:47


Progress Note


Patient appears to be cooperative with cares were going to get some labs 

including a UA, alcohol, ammonia and lipase. Plan to give him pantoprazole and 

Zofran. Liter lactated Ringer's, thiamine and and folate.


Progress Note #2:  


   Time:  15:09


Progress Note


Unremarkable labs, CT of the chest x-ray. Alcohol level as expected for someone 

who drinks daily. He has received his dose of folic acid and thiamine.





Diagnostic Imaging





   Diagonstic Imaging:  Xray


   Plain Films/CT/US/NM/MRI:  chest


Comments


                 [facility] [place], [place]





NAME:   NAM MONSALVE


[facility] REC#:   [id number]


ACCOUNT#:   [id number]


PT STATUS:   REG ER


:   1945


PHYSICIAN:   BRENDEN DEE MD


ADMIT DATE:   20/ER


                                   ***Draft***


Date of Exam:20





CHEST 1 VIEW, AP/PA ONLY








INDICATION: Nausea and vomiting and altered mental status.





TIME OF EXAM: 2:13 PM





CORRELATION is made with prior chest from 2020.





Heart size is stable. The lungs are clear. The pulmonary


vascularity is normal. No infiltrate, effusion or pneumothorax is


detected.





IMPRESSION: No acute cardiopulmonary process is detected.





  Dictated on workstation # [id number]








Dict:   20 1424


Trans:   20 1426


[facility] [id number]





Interpreted by:     SELINA RAPP MD


Electronically signed by:


   Reviewed:  Reviewed by Me








   Diagonstic Imaging:  CT


   Plain Films/CT/US/NM/MRI:  head


Comments


                 [facility].


                                [place], [place]





NAME:   NAM MONSALVE BRII


MED REC#:   [id number]


ACCOUNT#:   [id number]


PT STATUS:   REG ER


:   1945


PHYSICIAN:   BRENDEN DEE MD


ADMIT DATE:   20/ER


                                   ***Draft***


Date of Exam:20





CT HEAD WO








PROCEDURE: CT head without contrast.





TECHNIQUE: Multiple contiguous axial images were obtained through


the brain without the use of intravenous contrast. Auto Exposure


Controls were utilized during the CT exam to meet ALARA standards


for radiation dose reduction. 





INDICATION: Altered mental status.





COMPARISON: No prior studies are available for comparison.





FINDINGS: Ventricles and sulci are somewhat prominent, consistent


with cerebral atrophy. There is an old lacunar infarct in the


right basal ganglia. No sulcal effacement or midline shift is


detected. No acute intra-axial or extra-axial hemorrhage is


detected. Cisterns are patent. Visualized paranasal sinuses are


clear.





IMPRESSION: Chronic changes. No acute intracranial process is


detected.





  Dictated on workstation # [id number]








Dict:   20 1453


Trans:   20 1457


[id number]





Interpreted by:     SELINA RAPP MD


Electronically signed by:


   Reviewed:  Reviewed by Me








   Diagonstic Imaging:  CT


   Plain Films/CT/US/NM/MRI:  abdomen, pelvis


Comments


NAME:   NAM MONSALVE


MED REC#:   [id number]


ACCOUNT#:   [id number]


PT STATUS:   REG ER


:   1945


PHYSICIAN:   BRENDEN DEE MD


ADMIT DATE:   20/ER


                                   ***Draft***


Date of Exam:20





CT ABDOMEN/PELVIS W








EXAMINATION: CT Abdomen and Pelvis with intravenous contrast.





TECHNIQUE: Multiple contiguous axial images were obtained through


the abdomen and pelvis after the uneventful administration of


intravenous contrast. All CT scans use one or more of the


following dose optimizing techniques: automated exposure control,


MA and/or KvP adjustment based on a patient size and exam type,


or iterative reconstruction. 





HISTORY: Abdominal pain





COMPARISON: 2020 point





FINDINGS: 





Limited views of the lower thorax show mild atelectasis.





The liver is normal without focal lesion. There is no biliary


ductal dilation. Gallbladder is normal.  Pancreas is normal. 


Spleen is normal. Adrenal glands are normal.





There are areas of cortical scarring in the kidneys. No


suspicious renal lesions are seen. There is no hydronephrosis.


Urinary bladder is normal. There is an unchanged 8.3 x 6.8 cm


cystic structure in the retroperitoneum on the left in the


posterior pararenal space.





Visualized bowel is normal in caliber without obstruction or


inflammation. There is diverticulosis without diverticulitis. The


appendix is normal. There is a small fat-containing umbilical


hernia. No free fluid or air. Aorta is atherosclerotic and normal


caliber. 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





There are no suspicious osseus lesions. There is no evidence of


fusion of L4-L5.





IMPRESSION:





1. No acute abnormality in the abdomen or pelvis.





2. Stable cystic structure in the retroperitoneum on the left,


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


presence on sonogram dated 2019.





  Dictated on workstation # [id number]








Dict:   20 1455


Trans:   20 1510


[facility] [id number]





Interpreted by:     KAYE PADILLA MD


Electronically signed by:


   Reviewed:  Reviewed by Me





Departure


Impression





   Primary Impression:  


   Dementia


   Qualified Codes:  F10.27 - Alcohol dependence with alcohol-induced persisting

   dementia


Disposition:   HOME, SELF-CARE


Condition:  Stable





Departure-Patient Inst.


Decision time for Depature:  15:30


Referrals:  


NAM THOMAS DO (PCP/Family)


Primary Care Physician


Patient Instructions:  Dementia (Including Alzheimer Disease), Dementia (DC)





Add. Discharge Instructions:  


Follow-up with your primary care doctor in the next 2-3 weeks.


Pantoprazole 1 tablet daily.


Scripts


Pantoprazole Sodium (Pantoprazole Sodium) 40 Mg Tablet.


40 MG PO DAILY for 30 Days, #30 TAB 0 Refills


   Prov: BRENDEN DEE         20











BRENDEN DEE                 Sep 11, 2020 13:37

## 2020-09-11 NOTE — NUR
FAMILY CALLED AND WILL BE HERE IN APPROXIMATELY 10 MINS TO PICK PATIENT UP. PT 
SITTING IN RECLYINER EATING HIS BREAKFAST AND DRINKING COFFEE.

## 2020-09-11 NOTE — NUR
PALOMA, PT NEPHEW CALLED A SECOND TIME AND MESSAGE LEFT FOR RIDE HOME. PT 
BELLIGERENT AND WILL NOT STAY IN ROOM.

## 2020-09-11 NOTE — ED EENT
History of Present Illness


General


Chief Complaint:  Oral/Throat Problems


Stated Complaint:  SORE THROAT,COUGH


Source:  patient


Exam Limitations:  no limitations





History of Present Illness


Date Seen by Provider:  Sep 11, 2020


Time Seen by Provider:  06:26


Initial Comments


Patient presents ER from home by EMS with chief complaint of sore throat. He 

says it started this morning. He says he does not recall calling EMS however EMS

says he was going on home. He is a chronic alcoholic. He is a poor historian but

well-known to this provider and this ER. EMS states they make several runs on 

him usually just to help him get back up off the floor but he usually denies 

transport. He is not having any shortness of air cough fever diarrhea loss of 

sense of taste. No known sick contacts because he says he just stays in his 

home. He has a nephew who lives near him who looks in on him.





Allergies and Home Medications


Allergies


Coded Allergies:  


     No Known Drug Allergies (Unverified , 5/29/20)





Home Medications


Albuterol Sulfate 1 Puff Puff, 2 PUFF INH Q4H


   1 PUFF = 90 MCG 


   Prescribed by: NIC COBIAN on 5/30/20 1136


Amlodipine Besylate 5 Mg Tablet, 5 MG PO DAILY, (Reported)


Apixaban 5 Mg Tablet, 5 MG PO 1200,1800, (Reported)


   TAKES TWICE DAILY @ 1200&1800 


Cefdinir 300 Mg Capsule, 300 MG PO BID


   Prescribed by: VASILE MAYA on 6/22/20 1138


Digoxin 125 Mcg Tablet, 125 MCG PO DAILY, (Reported)


Finasteride 5 Mg Tablet, 5 MG PO 1200, (Reported)


Folic Acid 1 Mg Tablet, 1 MG PO DAILY, (Reported)


Gabapentin 300 Mg Capsule, 300 MG PO TID, (Reported)


Metoprolol Tartrate 50 Mg Tablet, 50 MG PO 1200,1800, (Reported)


   TAKES TWICE DAILY @1200&1800 


Multivitamin 1 Each Tablet, 1 EACH PO DAILY, (Reported)


Omega-3/Dha/Epa/Fish Oil 1 Each Capsule, 1 EACH PO DAILY, (Reported)


Tamsulosin HCl 0.4 Mg Cap, 0.4 MG PO DAILY, (Reported)


Venlafaxine HCl 75 Mg Cap.er.24h, 150 MG PO DAILY, (Reported)


Verapamil HCl 80 Mg Tablet, 80 MG PO 1200,1800, (Reported)


   TAKES TWICE DAILY @1200&1800 





Patient Home Medication List


Home Medication List Reviewed:  Yes





Review of Systems


Review of Systems


Constitutional:  No chills, No diaphoresis


Eyes:  Denies Blindness, Denies Blurred Vision, Denies Drainage


Ears:  Denies Dizziness, Denies Pain


Nose:  denies clots, denies congestion


Mouth:  denies pain, denies swelling


Throat:  denies pain, denies swelling


Respiratory:  No cough, No hemoptysis


Cardiovascular:  No chest pain, No edema


Gastrointestinal:  No abdominal pain, No constipation, No diarrhea





All Other Systems Reviewed


Negative Unless Noted:  Yes





Past Medical-Social-Family Hx


Patient Social History


Alcohol Use:  Regular Use


Alcohol Beverage of Choice:  [address]


Recreational Drug Use:  No


Type Used:  Pipe





Immunizations Up To Date


Tetanus Booster (TDap):  Less than 5yrs


PED Vaccines UTD:  Yes


Date of Pneumonia Vaccine:  Oct 29, 2019





Past Medical History


Surgeries:  Yes (BACK SURGERY-DR. REESE)


Orthopedic, Tonsillectomy


Respiratory:  No


Cardiac:  Yes


Atrial Fibrillation, Hypertension


Neurological:  No (? NEUROPATHY OR RESTLESS LEGS ? )


Genitourinary:  Yes (PROSTATE PROBLEMS PER MEDICATION LIST-FLOMAX AND 

FINASTERIDE)


Prostate Problems, Bladder Infection


Gastrointestinal:  No


Musculoskeletal:  Yes (S/P BACK SURGERY WITH HARDWARE IN PLACE)


Chronic Back Pain


Endocrine:  No


HEENT:  No


Cancer:  No


Psychosocial:  Yes (ON EFFEXOR--UNKNOWN DX )


Integumentary:  No


Blood Disorders:  No





Family Medical History





SOCIAL HISTORY: 


-DRINKS "AT LEAST 2 HIGHBALLS EVERY NIGHT" 


-DENIES DRUG USE


-SMOKED A PIPE-STATES HE QUIT IN 1975





Physical Exam


Vital Signs





Vital Signs - First Documented








 9/11/20





 06:20


 


Temp 36.8


 


Pulse 85


 


Resp 18


 


B/P (MAP) 124/87 (99)


 


Pulse Ox 99


 


O2 Delivery Room Air








Height, Weight, BMI


Height: '"


Weight: lbs. oz. kg; 28.00 BMI


Method:


General Appearance:  WD/WN, no apparent distress


Eyes:  bilateral eye normal inspection, bilateral eye PERRL, bilateral eye EOMI


Ears:  bilateral ear auricle normal, bilateral ear canal normal, bilateral ear 

TM normal


Nose:  normal inspection, active bleeding


Mouth/Throat:  normal mouth inspection, pharynx normal, dental tenderness


Neck:  non-tender, full range of motion, supple


Cardiovascular:  normal peripheral pulses, regular rate, rhythm


Respiratory:  lungs clear, normal breath sounds, no respiratory distress, no 

accessory muscle use


Gastrointestinal:  normal bowel sounds, non tender, soft


Neurologic/Psychiatric:  alert, normal mood/affect, oriented x 3


Skin:  normal color, warm/dry





Progress/Results/Core Measures


Results/Orders


Lab Results





Laboratory Tests








Test


 9/11/20


06:45 Range/Units


 


 


Group A Streptococcus Screen NEGATIVE  NEGATIVE  








My Orders





Orders - BRENDEN DEE


Rapid Strep A Screen (9/11/20 06:51)





Vital Signs/I&O











 9/11/20





 06:20


 


Temp 36.8


 


Pulse 85


 


Resp 18


 


B/P (MAP) 124/87 (99)


 


Pulse Ox 99


 


O2 Delivery Room Air











Progress


Progress Note #1:  


   Time:  06:53


Progress Note


Patient declines to provide any blood samples. He says he doesn't really want to

be here but he did accept doing a strep swab. He does not have cough shortness 

of air fever or history of such. He is not a COVID-19 PUI. He is redirectable 

but he has several times tried to walk out and since he is unstable on his feet 

and presumably drunk we have had to redirect him. He did cock his fists trying 

to hit the nurses a couple times and was artfully redirected. Staff has him in a

wheelchair going up and down the halls of the ER with a mask on which is 

apparently distracting him for now. Phone call has been made to his nephew. He 

is oriented to time, place, self and somewhat situation. He has consistently 

insisted that he does not want to be in the ER and wants to go home.


Progress Note #2:  


   Time:  07:16


Progress Note


The patient has declined to participate in care consistent stating he just wants

to go home. We have gotten a hold of his nephews out of state but he's got a 

call the sister says if she can give him a ride home. He has been provided with 

water and offered something to eat for breakfast. He is oriented enough to make 

his own decisions and were going to allow him to go home without insisting it be

AGAINST MEDICAL ADVICE.





Departure


Impression





   Primary Impression:  


   Pharyngitis


   Qualified Codes:  J02.9 - Acute pharyngitis, unspecified


Disposition:  01 HOME, SELF-CARE


Condition:  Stable





Departure-Patient Inst.


Decision time for Depature:  07:15


Referrals:  


NAM THOMAS DO (PCP/Family)


Primary Care Physician


Patient Instructions:  Sore Throat, Adult (DC)





Add. Discharge Instructions:  


Follow-up primary care as necessary.


Return to the ER if you have shortness of breath, chest pain or other worrisome 

symptoms.





All discharge instructions reviewed with patient and/or family. Voiced 

understanding.











BRENDEN DEE                 Sep 11, 2020 06:42

## 2020-09-11 NOTE — DIAGNOSTIC IMAGING REPORT
PROCEDURE: CT head without contrast.



TECHNIQUE: Multiple contiguous axial images were obtained through

the brain without the use of intravenous contrast. Auto Exposure

Controls were utilized during the CT exam to meet ALARA standards

for radiation dose reduction. 



INDICATION: Altered mental status.



COMPARISON: No prior studies are available for comparison.



FINDINGS: Ventricles and sulci are somewhat prominent, consistent

with cerebral atrophy. There is an old lacunar infarct in the

right basal ganglia. No sulcal effacement or midline shift is

detected. No acute intra-axial or extra-axial hemorrhage is

detected. Cisterns are patent. Visualized paranasal sinuses are

clear.



IMPRESSION: Chronic changes. No acute intracranial process is

detected.



Dictated by: 



  Dictated on workstation # [id number]

## 2020-09-11 NOTE — NUR
PT ARRIVES BY EMS WITH SORE THROAT; PT HAS ODOR OF ETOH AND IS INTERMITTENTLY 
AGGRESSIVE WITH STAFF. PT IS ORIENTED TO SELF AND PLACE. PT IS UNWILLING TO 
ALLOW A BLOOD DRAW BUT WILLING TO ALLOW A THROAT SWAB. 

PT STOOD AND URINATED ON THE FLOOR. PT REORIENTED TO SITUATION AND ASKED TO 
STAY IN ROOM AND ON BED BUT HE CONTINUES TO AMBULATE OUT OF THE ROOM.

## 2020-09-11 NOTE — DIAGNOSTIC IMAGING REPORT
EXAMINATION: CT Abdomen and Pelvis with intravenous contrast.



TECHNIQUE: Multiple contiguous axial images were obtained through

the abdomen and pelvis after the uneventful administration of

intravenous contrast. All CT scans use one or more of the

following dose optimizing techniques: automated exposure control,

MA and/or KvP adjustment based on a patient size and exam type,

or iterative reconstruction. 



HISTORY: Abdominal pain



COMPARISON: 05/29/2020 point



FINDINGS: 



Limited views of the lower thorax show mild atelectasis.



The liver is normal without focal lesion. There is no biliary

ductal dilation. Gallbladder is normal.  Pancreas is normal. 

Spleen is normal. Adrenal glands are normal.



There are areas of cortical scarring in the kidneys. No

suspicious renal lesions are seen. There is no hydronephrosis.

Urinary bladder is normal. There is an unchanged 8.3 x 6.8 cm

cystic structure in the retroperitoneum on the left in the

posterior pararenal space.



Visualized bowel is normal in caliber without obstruction or

inflammation. There is diverticulosis without diverticulitis. The

appendix is normal. There is a small fat-containing umbilical

hernia. No free fluid or air. Aorta is atherosclerotic and normal

caliber. 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



There are no suspicious osseus lesions. There is no evidence of

fusion of L4-L5.



IMPRESSION:



1. No acute abnormality in the abdomen or pelvis.



2. Stable cystic structure in the retroperitoneum on the left,

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

presence on sonogram dated 09/20/2019.



Dictated by: 



  Dictated on workstation # [id number]

## 2020-09-11 NOTE — NUR
PT BELLIGERENT AND WILL NOT STAY IN ROOM. STATES HE WANTS TO LEAVE AND REFUSES 
TO BE TREATED. PALOMA, PT NEPHEW CALLED AT THIS TIME AND MESSAGE LEFT FOR RIDE 
HOME. PT BELLIGERENT AND WILL NOT STAY IN ROOM.

## 2020-11-02 ENCOUNTER — HOSPITAL ENCOUNTER (OUTPATIENT)
Dept: HOSPITAL 75 - ER | Age: 75
Setting detail: OBSERVATION
LOS: 3 days | Discharge: OTHER INSTITUTION | End: 2020-11-05
Attending: FAMILY MEDICINE | Admitting: FAMILY MEDICINE
Payer: MEDICARE

## 2020-11-02 VITALS — DIASTOLIC BLOOD PRESSURE: 94 MMHG | SYSTOLIC BLOOD PRESSURE: 160 MMHG

## 2020-11-02 VITALS — WEIGHT: 212.53 LBS | HEIGHT: 70 IN | BODY MASS INDEX: 30.43 KG/M2

## 2020-11-02 VITALS — DIASTOLIC BLOOD PRESSURE: 93 MMHG | SYSTOLIC BLOOD PRESSURE: 142 MMHG

## 2020-11-02 VITALS — SYSTOLIC BLOOD PRESSURE: 138 MMHG | DIASTOLIC BLOOD PRESSURE: 90 MMHG

## 2020-11-02 VITALS — SYSTOLIC BLOOD PRESSURE: 160 MMHG | DIASTOLIC BLOOD PRESSURE: 94 MMHG

## 2020-11-02 VITALS — SYSTOLIC BLOOD PRESSURE: 121 MMHG | DIASTOLIC BLOOD PRESSURE: 78 MMHG

## 2020-11-02 VITALS — SYSTOLIC BLOOD PRESSURE: 128 MMHG | DIASTOLIC BLOOD PRESSURE: 82 MMHG

## 2020-11-02 DIAGNOSIS — E51.2: ICD-10-CM

## 2020-11-02 DIAGNOSIS — I10: ICD-10-CM

## 2020-11-02 DIAGNOSIS — R45.851: ICD-10-CM

## 2020-11-02 DIAGNOSIS — Z87.891: ICD-10-CM

## 2020-11-02 DIAGNOSIS — M54.9: ICD-10-CM

## 2020-11-02 DIAGNOSIS — Z79.899: ICD-10-CM

## 2020-11-02 DIAGNOSIS — Z87.01: ICD-10-CM

## 2020-11-02 DIAGNOSIS — N42.9: ICD-10-CM

## 2020-11-02 DIAGNOSIS — I48.20: ICD-10-CM

## 2020-11-02 DIAGNOSIS — G89.29: ICD-10-CM

## 2020-11-02 DIAGNOSIS — F03.90: ICD-10-CM

## 2020-11-02 DIAGNOSIS — F10.121: Primary | ICD-10-CM

## 2020-11-02 LAB
ALBUMIN SERPL-MCNC: 4.2 GM/DL (ref 3.2–4.5)
ALP SERPL-CCNC: 77 U/L (ref 40–136)
ALT SERPL-CCNC: 24 U/L (ref 0–55)
APAP SERPL-MCNC: < 10 UG/ML (ref 10–30)
APTT PPP: YELLOW S
BACTERIA #/AREA URNS HPF: NEGATIVE /HPF
BASOPHILS # BLD AUTO: 0.1 10^3/UL (ref 0–0.1)
BASOPHILS NFR BLD AUTO: 1 % (ref 0–10)
BILIRUB SERPL-MCNC: 0.9 MG/DL (ref 0.1–1)
BILIRUB UR QL STRIP: NEGATIVE
BUN/CREAT SERPL: 7
CALCIUM SERPL-MCNC: 9.2 MG/DL (ref 8.5–10.1)
CHLORIDE SERPL-SCNC: 103 MMOL/L (ref 98–107)
CO2 SERPL-SCNC: 22 MMOL/L (ref 21–32)
CREAT SERPL-MCNC: 1.07 MG/DL (ref 0.6–1.3)
EOSINOPHIL # BLD AUTO: 0.1 10^3/UL (ref 0–0.3)
EOSINOPHIL NFR BLD AUTO: 2 % (ref 0–10)
FIBRINOGEN PPP-MCNC: CLEAR MG/DL
GFR SERPLBLD BASED ON 1.73 SQ M-ARVRAT: > 60 ML/MIN
GLUCOSE SERPL-MCNC: 155 MG/DL (ref 70–105)
GLUCOSE UR STRIP-MCNC: NEGATIVE MG/DL
HCT VFR BLD CALC: 47 % (ref 40–54)
HGB BLD-MCNC: 16.2 G/DL (ref 13.3–17.7)
HYALINE CASTS #/AREA URNS LPF: (no result) /LPF
KETONES UR QL STRIP: NEGATIVE
LEUKOCYTE ESTERASE UR QL STRIP: NEGATIVE
LYMPHOCYTES # BLD AUTO: 1.7 10^3/UL (ref 1–4)
LYMPHOCYTES NFR BLD AUTO: 32 % (ref 12–44)
MANUAL DIFFERENTIAL PERFORMED BLD QL: NO
MCH RBC QN AUTO: 33 PG (ref 25–34)
MCHC RBC AUTO-ENTMCNC: 35 G/DL (ref 32–36)
MCV RBC AUTO: 94 FL (ref 80–99)
MONOCYTES # BLD AUTO: 0.6 10^3/UL (ref 0–1)
MONOCYTES NFR BLD AUTO: 11 % (ref 0–12)
NEUTROPHILS # BLD AUTO: 2.8 10^3/UL (ref 1.8–7.8)
NEUTROPHILS NFR BLD AUTO: 53 % (ref 42–75)
NITRITE UR QL STRIP: NEGATIVE
PH UR STRIP: 5.5 [PH] (ref 5–9)
PLATELET # BLD: 173 10^3/UL (ref 130–400)
PMV BLD AUTO: 9.5 FL (ref 9–12.2)
POTASSIUM SERPL-SCNC: 3.9 MMOL/L (ref 3.6–5)
PROT SERPL-MCNC: 8 GM/DL (ref 6.4–8.2)
PROT UR QL STRIP: NEGATIVE
RBC #/AREA URNS HPF: (no result) /HPF
SALICYLATES SERPL-MCNC: < 5 MG/DL (ref 5–20)
SODIUM SERPL-SCNC: 141 MMOL/L (ref 135–145)
SP GR UR STRIP: 1.01 (ref 1.02–1.02)
WBC # BLD AUTO: 5.3 10^3/UL (ref 4.3–11)
WBC #/AREA URNS HPF: (no result) /HPF

## 2020-11-02 PROCEDURE — 87635 SARS-COV-2 COVID-19 AMP PRB: CPT

## 2020-11-02 PROCEDURE — 80329 ANALGESICS NON-OPIOID 1 OR 2: CPT

## 2020-11-02 PROCEDURE — 94760 N-INVAS EAR/PLS OXIMETRY 1: CPT

## 2020-11-02 PROCEDURE — 99284 EMERGENCY DEPT VISIT MOD MDM: CPT

## 2020-11-02 PROCEDURE — 96372 THER/PROPH/DIAG INJ SC/IM: CPT

## 2020-11-02 PROCEDURE — 81000 URINALYSIS NONAUTO W/SCOPE: CPT

## 2020-11-02 PROCEDURE — 36415 COLL VENOUS BLD VENIPUNCTURE: CPT

## 2020-11-02 PROCEDURE — 85025 COMPLETE CBC W/AUTO DIFF WBC: CPT

## 2020-11-02 PROCEDURE — 80053 COMPREHEN METABOLIC PANEL: CPT

## 2020-11-02 PROCEDURE — 80320 DRUG SCREEN QUANTALCOHOLS: CPT

## 2020-11-02 PROCEDURE — 80162 ASSAY OF DIGOXIN TOTAL: CPT

## 2020-11-02 RX ADMIN — METOPROLOL TARTRATE SCH MG: 50 TABLET, FILM COATED ORAL at 17:26

## 2020-11-02 RX ADMIN — APIXABAN SCH MG: 5 TABLET, FILM COATED ORAL at 20:32

## 2020-11-02 RX ADMIN — ONDANSETRON PRN MG: 4 TABLET, ORALLY DISINTEGRATING ORAL at 21:48

## 2020-11-02 RX ADMIN — TAMSULOSIN HYDROCHLORIDE SCH MG: 0.4 CAPSULE ORAL at 17:26

## 2020-11-02 RX ADMIN — VERAPAMIL HYDROCHLORIDE SCH MG: 80 TABLET ORAL at 17:26

## 2020-11-02 NOTE — NUR
LAWRENCEANNEMARIE PARKERIN CALLED AND  STATED THAT HE HAS  DEMENTIA AND HAS BEEN DRINKING 
EVERY DAY. HAD A APPOINTMENT WITH DR THOMAS ON THURSDAY. IS POA AND REQUEST 
THAT  HE GO TO [facility].

## 2020-11-02 NOTE — NUR
CALLED [facility] IN [place] TO SEE IF PT HAS RECEIVED THE FLU AND PNEUMONIA VACCINE.  NO ANSWER, 
HOUR OF OPERATION FROM 8-5PM. WILL PASS INFO ALONG TO NEXT RN TO CALL TOMORROW AM.
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
## 2020-11-03 VITALS — SYSTOLIC BLOOD PRESSURE: 107 MMHG | DIASTOLIC BLOOD PRESSURE: 66 MMHG

## 2020-11-03 VITALS — DIASTOLIC BLOOD PRESSURE: 91 MMHG | SYSTOLIC BLOOD PRESSURE: 129 MMHG

## 2020-11-03 VITALS — DIASTOLIC BLOOD PRESSURE: 82 MMHG | SYSTOLIC BLOOD PRESSURE: 138 MMHG

## 2020-11-03 VITALS — SYSTOLIC BLOOD PRESSURE: 126 MMHG | DIASTOLIC BLOOD PRESSURE: 72 MMHG

## 2020-11-03 VITALS — SYSTOLIC BLOOD PRESSURE: 174 MMHG | DIASTOLIC BLOOD PRESSURE: 78 MMHG

## 2020-11-03 VITALS — DIASTOLIC BLOOD PRESSURE: 86 MMHG | SYSTOLIC BLOOD PRESSURE: 143 MMHG

## 2020-11-03 VITALS — SYSTOLIC BLOOD PRESSURE: 129 MMHG | DIASTOLIC BLOOD PRESSURE: 83 MMHG

## 2020-11-03 RX ADMIN — METOPROLOL TARTRATE SCH MG: 50 TABLET, FILM COATED ORAL at 17:32

## 2020-11-03 RX ADMIN — GABAPENTIN SCH MG: 300 CAPSULE ORAL at 20:36

## 2020-11-03 RX ADMIN — METOPROLOL TARTRATE SCH MG: 50 TABLET, FILM COATED ORAL at 12:55

## 2020-11-03 RX ADMIN — VERAPAMIL HYDROCHLORIDE SCH MG: 80 TABLET ORAL at 17:32

## 2020-11-03 RX ADMIN — Medication SCH MG: at 20:36

## 2020-11-03 RX ADMIN — ONDANSETRON PRN MG: 4 TABLET, ORALLY DISINTEGRATING ORAL at 17:32

## 2020-11-03 RX ADMIN — PANTOPRAZOLE SODIUM SCH MG: 40 TABLET, DELAYED RELEASE ORAL at 09:22

## 2020-11-03 RX ADMIN — ONDANSETRON PRN MG: 4 TABLET, ORALLY DISINTEGRATING ORAL at 12:55

## 2020-11-03 RX ADMIN — DIGOXIN SCH MG: 125 TABLET ORAL at 09:22

## 2020-11-03 RX ADMIN — APIXABAN SCH MG: 5 TABLET, FILM COATED ORAL at 09:22

## 2020-11-03 RX ADMIN — AMLODIPINE BESYLATE SCH MG: 5 TABLET ORAL at 09:22

## 2020-11-03 RX ADMIN — VERAPAMIL HYDROCHLORIDE SCH MG: 80 TABLET ORAL at 12:55

## 2020-11-03 RX ADMIN — ONDANSETRON PRN MG: 4 TABLET, ORALLY DISINTEGRATING ORAL at 01:42

## 2020-11-03 RX ADMIN — GABAPENTIN SCH MG: 300 CAPSULE ORAL at 14:52

## 2020-11-03 RX ADMIN — TAMSULOSIN HYDROCHLORIDE SCH MG: 0.4 CAPSULE ORAL at 17:32

## 2020-11-03 RX ADMIN — APIXABAN SCH MG: 5 TABLET, FILM COATED ORAL at 20:36

## 2020-11-03 NOTE — HISTORY & PHYSICAL-HOSPITALIST
History of Present Illness


HPI/Chief Complaint


Pt is a 75yoCM with a PMH of alcohol abuse, dementia who presented to the ER due

via police escort as he called the 's department and said he was going to

shoot himself. He does not recall any of this and when I told him he states "I 

must have been drunk." When the police arrived he did have weapons on him. He 

denies thoughts of self harm now but stated then "I always have weapons." In the

ER he became quite agitated and required Joanna to cooperate with staff. Since 

that time he has be compliant and cooperative. He states he is nauseated this 

morning but is not vomiting.


Source:  patient


Date Seen


11/3/20


Time Seen by a Provider:  12:37


Attending Physician


Luz Sewell MD


PCP


Duke Johnston DO


Referring Physician





Date of Admission


2020 at 15:05





Home Medications & Allergies


Home Medications


Reviewed patient Home Medication Reconciliation performed by pharmacy medication

reconciliations technician and/or nursing.


Patients Allergies have been reviewed.





Allergies





Allergies


Coded Allergies


  No Known Drug Allergies (Unverified20)








Past Medical-Social-Family Hx


Past Med/Social Hx:  Reviewed Nursing Past Med/Soc Hx


Patient Social History


Alcohol Use:  Regular Use


Alcohol Beverage of Choice:  Cheap Liquor


Recreational Drug Use:  No


Smoking Status:  Former Smoker


Type Used:  Pipe


Recent Foreign Travel:  No


Contact w/other who traveled:  No


Recent Infectious Disease Expo:  No





Immunizations Up To Date


Tetanus Booster (TDap):  Less than 5yrs


Pediatric:  Yes


Date of Pneumonia Vaccine:  Oct 29, 2019





Past Medical History


Surgeries:  Orthopedic, Tonsillectomy


Respiratory:  Pneumonia


Cardiac:  Atrial Fibrillation, Hypertension


Genitourinary:  Prostate Problems, Bladder Infection


Musculoskeletal:  Chronic Back Pain


ETOHism


History of Blood Disorders:  No





Family History


Reviewed Nursing Family Hx





SOCIAL HISTORY: 


-DRINKS "AT LEAST 2 HIGHBALLS EVERY NIGHT" 


-DENIES DRUG USE


-SMOKED A PIPE-STATES HE QUIT IN 





Review of Systems


ROS-Unable to Obtain:  limited by dementia


Constitutional:  see HPI


Psychiatric/Neurological:  See HPI





Physical Exam


Physical Exam


Vital Signs





Vital Signs - First Documented








 20





 11:45 15:54 23:12


 


Temp  36.7 


 


Pulse 98  


 


Resp 18  


 


B/P (MAP) 135/95 (108)  


 


Pulse Ox 92  


 


O2 Delivery Room Air  


 


O2 Flow Rate   2.00





Capillary Refill : Less Than 3 SecondsLess Than 3 Seconds


Height, Weight, BMI


Height: '"


Weight: lbs. oz. kg; 30.49 BMI


Method:


General Appearance:  No Apparent Distress, WD/WN


HEENT:  PERRL/EOMI, Moist Mucous Membranes; No Scleral Icterus (L), No Scleral 

Icterus (R)


Neck:  Normal Inspection, Supple


Respiratory:  Lungs Clear, No Accessory Muscle Use, No Respiratory Distress


Cardiovascular:  Regular Rate, Rhythm, No Murmur


Gastrointestinal:  Normal Bowel Sounds, Non Tender, Soft


Extremity:  No Calf Tenderness, No Pedal Edema


Neurologic/Psychiatric:  Alert, Depressed Affect, Other (oriented to person and 

place)


Skin:  Normal Color, Warm/Dry





Results


Results/Procedures


Labs


Patient resulted labs reviewed.





Assessment/Plan


Admission Diagnosis


Suicidal Ideation


Admission Status:  Observation





Assessment and Plan


Suicidal Ideation


Alcohol intoxication


   Has history of suicide attempts and psych admissions


   Given that he was found with weapons I am concerned that he would complete 

suicide if discharged home


   Telesitter in place


   [place] [facility] unit requests him to be sober for 3 days prior to 

evaluating


   Will continue to search for psych admission


   


Wernicke's Encephalopathy


Alcoholism


   CIWA protocol


   Baseline confusion from wernicke's





Chronic AF


   Continue home meds





DVt ppx: Eliquis





Clinical Quality Measures


DVT/VTE Risk/Contraindication:


Risk Factor Score Per Nursin


RFS Level Per Nursing on Admit:  4+=Very High











LUZ SEWELL MD               Nov 3, 2020 12:41

## 2020-11-03 NOTE — NUR
I SPOKE WITH THE PTS NEPHEW (PALOMA) AND GOT A MEDICATION LIST FROM [facility] TO COMPLETE THE 
MED REC

PALOMA AND I WENT OVER EACH MEDICATION AND HE WAS ABLE TO TELL ME HOW THE PT TAKES EACH



THE FOLLOWING ARE FILL DATES ACCORDING TO FAHAD MARTINEZ:

08- TAMSULOSIN 0.4MG #30/30DS- I DID DOCUMENT THE PAST DUE FILL ON THE MED REC

09- PANTOPRAZOLE 40MG #30/30DS- I DID DOCUMENT THE PAST DUE FILL ON THE MED REC

10- FINASTERIDE 5MG #30/30DS

10- DILTIAZEM 60MG #60/30DS

10- VENLAFAXINE ER 75MG #60/30DS

10- AMLODIPINE 5MG #30/30DS

10- GABAPENTIN 300MG #90/30

10- ELIQUIS 5MG #60/30DS

10- DIGOXIN 0.125MG #30/30DS

10- FOLIC ACID 1MG #30/30DS

10- METOPROLOL TART 50MG #60/30DS



OTC MEDS:

TYLENOL

MTV

FISH OIL

## 2020-11-03 NOTE — NUR
BRISEYDA/TERESA visited with patient for social service consult. 



BRISEYDA/TERESA was informed by the patient's physician that patient will need an inpatient [facility]as psych 
placement. BRISEYDA/TERESA contacted [facility] to make a referral. BRISEYDA/TERESA faxed 
referral. This sw received call from Aleyda at facility stating at this time they cannot take 
patient. She states he needs to be detoxed for 3 days (Thursday) and his police involvement 
at the hospital "is more than they can handle right now". Aleyda reports they would be more 
than willing to look back over it on Thursday. 



BRISEYDA/TERESA asked for assistance from Viviane BARRETT to contact additional Behavioral Health units 
for bed placement.

## 2020-11-03 NOTE — NUR
[place] [facility] placement exploration: 

[facility]- no beds available 

[facility]- no psych unit anymore

[facility]- no psych unit anymore

[facility] in [place], [place]- Patient will need to detox at least three days before they will 
consider him for admission. They recommend having him screened to determine if he is deemed 
as needing inpatient treatment. 

[facility]- message left and no return phone call 

[facility]-no beds available 

[facility] in [place], [place]-No beds available and can not accept across [facility] lines 

[facility] in [place], [place]- would not accept d/t patient needs to be voluntary 
and/or if patient was involuntary then they would not accept 

[facility] in [place], [place]- They have been in communication with us and did take 
information on the patient however they spoke with the primary care nurse and the patient is 
unwilling to go that far for psych treatment. They report that they can not accept if he is 
not voluntary.

## 2020-11-04 VITALS — SYSTOLIC BLOOD PRESSURE: 142 MMHG | DIASTOLIC BLOOD PRESSURE: 94 MMHG

## 2020-11-04 VITALS — SYSTOLIC BLOOD PRESSURE: 102 MMHG | DIASTOLIC BLOOD PRESSURE: 72 MMHG

## 2020-11-04 VITALS — DIASTOLIC BLOOD PRESSURE: 83 MMHG | SYSTOLIC BLOOD PRESSURE: 131 MMHG

## 2020-11-04 VITALS — DIASTOLIC BLOOD PRESSURE: 97 MMHG | SYSTOLIC BLOOD PRESSURE: 148 MMHG

## 2020-11-04 VITALS — SYSTOLIC BLOOD PRESSURE: 102 MMHG | DIASTOLIC BLOOD PRESSURE: 70 MMHG

## 2020-11-04 RX ADMIN — DIGOXIN SCH MG: 125 TABLET ORAL at 08:12

## 2020-11-04 RX ADMIN — GABAPENTIN SCH MG: 300 CAPSULE ORAL at 08:11

## 2020-11-04 RX ADMIN — AMLODIPINE BESYLATE SCH MG: 5 TABLET ORAL at 08:12

## 2020-11-04 RX ADMIN — GABAPENTIN SCH MG: 300 CAPSULE ORAL at 20:06

## 2020-11-04 RX ADMIN — VERAPAMIL HYDROCHLORIDE SCH MG: 80 TABLET ORAL at 12:40

## 2020-11-04 RX ADMIN — VENLAFAXINE HYDROCHLORIDE SCH MG: 75 CAPSULE, EXTENDED RELEASE ORAL at 08:12

## 2020-11-04 RX ADMIN — Medication SCH MG: at 08:12

## 2020-11-04 RX ADMIN — GABAPENTIN SCH MG: 300 CAPSULE ORAL at 12:40

## 2020-11-04 RX ADMIN — Medication SCH EA: at 06:04

## 2020-11-04 RX ADMIN — METOPROLOL TARTRATE SCH MG: 50 TABLET, FILM COATED ORAL at 17:28

## 2020-11-04 RX ADMIN — PANTOPRAZOLE SODIUM SCH MG: 40 TABLET, DELAYED RELEASE ORAL at 08:11

## 2020-11-04 RX ADMIN — APIXABAN SCH MG: 5 TABLET, FILM COATED ORAL at 08:12

## 2020-11-04 RX ADMIN — FOLIC ACID SCH MG: 1 TABLET ORAL at 08:12

## 2020-11-04 RX ADMIN — Medication SCH MG: at 20:06

## 2020-11-04 RX ADMIN — TAMSULOSIN HYDROCHLORIDE SCH MG: 0.4 CAPSULE ORAL at 17:28

## 2020-11-04 RX ADMIN — APIXABAN SCH MG: 5 TABLET, FILM COATED ORAL at 20:06

## 2020-11-04 RX ADMIN — Medication SCH MG: at 06:04

## 2020-11-04 RX ADMIN — METOPROLOL TARTRATE SCH MG: 50 TABLET, FILM COATED ORAL at 12:40

## 2020-11-04 RX ADMIN — VERAPAMIL HYDROCHLORIDE SCH MG: 80 TABLET ORAL at 17:28

## 2020-11-04 NOTE — NUR
CM/SS follow up. 



CM/SS visited with patient. The patient was sitting up in bed drinking coffee. He states he 
feels good today and "he always feels good". The patient does not remember the conversation 
between him and this sw the day before. CM/SS attempted to talk with him again about [facility] placement. The patient asked this sw why he would need that. This sw 
explained what brought him into the hospital and how [facility] can help. The patent stated "So 
your telling me, that you want to take away my right to take my own life?" The patient then 
stated "Next time I wont even call the police, I'll just do it." CM/SS ensure the patient we 
are here to help, he stated " Well then get me a gun." The patient would switch from being 
agitated to telling stories and laughing. 



CM/SS spoke with Yan from [facility] due to patient not being willing for behavioral health 
placement and still making suicidal comments. Yan reports that he cannot screen him due to 
dementia and not being able to do an involuntary placement. BRISEYDA/SS contacted Hiral at 
[facility] to discuss case. She reports that at this time patient's ALEIDA [facility]on would be making the decision for placement and sign him in. Hiral reports this sw 
will need to send updated labs and alcohol level with clinicals on Thursday. BRISEYDA/SS 
verbalized understanding. 



BRISEYDA/SS contacted ALEIDA Limon to discuss discharge plans. CM/SS gave an update regarding 
inpatient rehab referrals. BRISEYDA/SS informed him this sw will resend referral on Thursday. 
BRISEYDA/TERESA discussed discharge plans for after behavioral health referral. He states they will 
plan for a skilled nursing home placement. 



CM/TERESA will continue to follow.

## 2020-11-04 NOTE — NUR
RD ASSESSMENT 



PMHx: ETOH abuse; Wernicke's encephalopathy; HTN; afib; dementia; 



PT INTERACTION: Pt was awake and pleasant during nutrition assessment. Note pt has dementia, 
per chart review. Pt states current appetite is good. Note avg PO intake 50% x2d, per chart 
review. Pt states following a regular diet at home, and has no issues with 
chewing/swallowing food. Note pt has hx of ETOH abuse, per chart review. Pt states no recent 
issues with nausea, vomiting or diarrhea. Pt states some recent issues with constipation. 
Note episodes of emesis on 11/3, per chart review. Note last BM was 11/3, and pt currently 
on bowel regimen of senna PRN, per chart review. Pt states no recent wt changes. Note recent 
7# wt gain x5mon, per chart review.



ABNORMAL NUTRITION-RELATED LAB VALUES

LOW:  

HIGH: glu 155; AST 79



Est. kcal needs: 9060-2438 kcal | 20-25 kcal/kg  

Est. Pro needs:  77-96 g Pro | 0.8-1.0 g Pro/kg 



PES STATEMENT: Inadequate oral intake (NI-2.1) related to loss of appetite and constipation, 
as evidenced by pt interview and avg PO intake 50% x2d. 



INTERVENTION:  

Continue with current diet order of 2000mg Na diet. 

Pt may benefit from nutrition supplementation if PO intake declines. 

Encouraged pt to eat when able. 

Will continue to follow and reassess as pt needs, intake, and status change. 





CUCO Chance, MS RD LD

## 2020-11-04 NOTE — NUR
DURING ASSESSMENT OF PT TELESITTER IS NOT IN ROOM. THIS RN ATTEMPTED TO PUT TELESITTER IN 
ROOM PT BECAME VERY AGITATED. STATES ITS AGAINST MY RIGHTS FOR YOU TO WATCH ME. EDUCATION 
ATTEMPTED. EDUCATION UNSUCCESSFUL. THIS RN REMOVED TELESITTER AT THIS TIME. WILL REASSESS 
PT. BED ALARM SET. CALL LIGHT WITHIN REACH. PT EDUCATED TO CALL WHEN NEEDING ASSISTANCE. PT 
STATES UNDERSTANDING.

## 2020-11-04 NOTE — PROGRESS NOTE - HOSPITALIST
Subjective


HPI/CC On Admission


Date Seen by Provider:  2020


Time Seen by Provider:  09:56


Pt is a 75yoCM with a PMH of alcohol abuse, dementia who presented to the ER due

via police escort as he called the 's department and said he was going to

shoot himself. He does not recall any of this and when I told him he states "I 

must have been drunk." When the police arrived he did have weapons on him. He 

denies thoughts of self harm now but stated then "I always have weapons." In the

ER he became quite agitated and required Joanna to cooperate with staff. Since 

that time he has be compliant and cooperative. He states he is nauseated this 

morning but is not vomiting.


Subjective/Events-last exam


Pt reports doing ok. No specific complaints. Discussed plan to look for [facility] 

placement which he is at times agreeable too. He did not outright state that he 

wanted to kill himself still to me (though he did to the ) but he 

did recount how all of his friends and family have .





Objective


Exam


Vital Signs





Vital Signs








  Date Time  Temp Pulse Resp B/P (MAP) Pulse Ox O2 Delivery O2 Flow Rate FiO2


 


20 08:00     97 Room Air  


 


20 07:25 36.2 72 18 131/83 (99)    


 


20 23:12       2.00 





Capillary Refill : Less Than 3 SecondsLess Than 3 Seconds


General Appearance:  No Apparent Distress, WD/WN, Chronically ill


Respiratory:  Lungs Clear, No Respiratory Distress


Cardiovascular:  Regular Rate, Rhythm, No Murmur


Gastrointestinal:  Normal Bowel Sounds, Non Tender, Soft


Neurologic/Psychiatric:  Alert, Oriented x3 (but poor short term recall)





Results/Procedures


Lab


Patient resulted labs reviewed.





Assessment/Plan


Assessment and Plan


Assess & Plan/Chief Complaint


Suicidal Ideation


Alcohol intoxication


   Has history of suicide attempts and psych admissions


   Given that he was found with weapons I am concerned that he would complete 

suicide if discharged home


   Telesitter in place


   [facility] requests him to be sober for 3 days prior to 

evaluating


   Will continue to search for psych admission


   He continues to endorse suicidal ideation


   


Wernicke's Encephalopathy


Alcoholism


   [facility] protocol


   Baseline confusion from wernicke's


   Oriented to person, place, and major details but has poor short term recall





Chronic AF


   Continue home meds





DVt ppx: Eliquis





Clinical Quality Measures


DVT/VTE Risk/Contraindication:


Risk Factor Score Per Nursin


RFS Level Per Nursing on Admit:  4+=Very High











LUZ CHRISTENSEN MD               2020 09:59

## 2020-11-05 VITALS — SYSTOLIC BLOOD PRESSURE: 109 MMHG | DIASTOLIC BLOOD PRESSURE: 71 MMHG

## 2020-11-05 VITALS — DIASTOLIC BLOOD PRESSURE: 88 MMHG | SYSTOLIC BLOOD PRESSURE: 130 MMHG

## 2020-11-05 VITALS — DIASTOLIC BLOOD PRESSURE: 78 MMHG | SYSTOLIC BLOOD PRESSURE: 119 MMHG

## 2020-11-05 VITALS — DIASTOLIC BLOOD PRESSURE: 81 MMHG | SYSTOLIC BLOOD PRESSURE: 115 MMHG

## 2020-11-05 RX ADMIN — APIXABAN SCH MG: 5 TABLET, FILM COATED ORAL at 09:47

## 2020-11-05 RX ADMIN — Medication SCH EA: at 06:26

## 2020-11-05 RX ADMIN — DIGOXIN SCH MG: 125 TABLET ORAL at 09:47

## 2020-11-05 RX ADMIN — GABAPENTIN SCH MG: 300 CAPSULE ORAL at 09:47

## 2020-11-05 RX ADMIN — Medication SCH MG: at 09:47

## 2020-11-05 RX ADMIN — VENLAFAXINE HYDROCHLORIDE SCH MG: 75 CAPSULE, EXTENDED RELEASE ORAL at 09:52

## 2020-11-05 RX ADMIN — Medication SCH MG: at 06:26

## 2020-11-05 RX ADMIN — PANTOPRAZOLE SODIUM SCH MG: 40 TABLET, DELAYED RELEASE ORAL at 09:47

## 2020-11-05 RX ADMIN — AMLODIPINE BESYLATE SCH MG: 5 TABLET ORAL at 09:47

## 2020-11-05 RX ADMIN — FOLIC ACID SCH MG: 1 TABLET ORAL at 09:47

## 2020-11-05 NOTE — NUR
REPORT TO [facility].  PATIENT IS A/O.  VERBALIZES UNDERSTANDING OF 
PLACEMENT.  DENIES ANY NEEDS OR C/O AT THIS TIME. CONT TO MONITOR.

## 2020-11-05 NOTE — NUR
NAM MONSALVE discharged to [facility]. ALEIDA ROCK notified of discharge and report 
given to [facility]. NAM MONSALVE belongings sent with PATIENT. Skin dry and intact; no 
breakdown noted. SALINE LOCK REMOVED, PRESSURE APPLIED. Vital signs are stable at time of 
discharge./86, HR 86, T 36.2, 96%RA. Condition is stable at time of discharge. 
Discharge instructions and copies of H&P, discharge summary, physician's order, lab reports, 
consultation reports, other dictated reports, diagnostic imaging reports, Advance Directive, 
eMAR, vital signs, intake and output sent with PATIENT. Patient discharged from [facility] on 
11/5@1140.  NAM MONSALVE left floor via WC, accompanied by STAFF.  NAM MONSALVE and 
family/DPOA notified and verbalize understanding of discharge to [facility].

## 2020-11-05 NOTE — NUR
Primary care nurse Karen BARRETT is busy with other patient needs at this time. Rapid COVID swab 
collected, paper work filled out, and sent to lab. Contacted lab to let them know that 
patient is ordered for a rapid test. No further interventions noted at this time.

## 2020-11-05 NOTE — DISCHARGE SUMMARY
Diagnosis/Chief Complaint


Date of Admission


2020 at 15:05


Date of Discharge





Discharge Date:  2020


Admission Diagnosis


Suicidal Ideation


Primary Care


Duke Johnston DO





Discharge Summary


Discharge Physical Exam


Allergies:  


Coded Allergies:  


     No Known Drug Allergies (Unverified , 20)


Vitals & I&Os





Vital Signs








  Date Time  Temp Pulse Resp B/P (MAP) Pulse Ox O2 Delivery O2 Flow Rate FiO2


 


20 12:45 36.5 86 20 115/81 96 Room Air  


 


20 23:12       2.00 








General Appearance:  No Apparent Distress, WD/WN


Respiratory:  Lungs Clear, No Respiratory Distress


Cardiovascular:  Regular Rate, Rhythm, No Murmur


Neurologic/Psychiatric:  Alert, Other (oriented to person and place, poor short 

term recall)





Hospital Course


Pt was admitted due to suicidal ideation and alcohol intoxication. He was 

admitted to monitor detoxing off alcohol and did well. He needed no ativan or 

other CIWA measures. He continued to have suicidal ideation expressed to the 

. He was agreeable to placement at [facility] as was 

his DPOA. He was discharged in stable condition to [facility].


Labs (last 24 hrs)


Laboratory Tests


20 04:50: Serum Alcohol < 10


20 09:05: Coronavirus 2019 (FLACO) Negative


Patient resulted labs reviewed.


Pending Labs


Laboratory Tests


20 09:05: Coronavirus 2019 (FLACO) Negative








Discussion & Recommendations


Discharge Planning:  >30 minutes discharge planning





Discharge


Home Medications:





Active Scripts


Active


Reported


Tylenol (Acetaminophen) 325 Mg Tablet 650 Mg PO Q8H PRN


Pantoprazole Sodium 40 Mg Tablet.dr 40 Mg PO DAILY


     LAST FILLED 2020 # DAY SUPPLY


Diltiazem HCl 60 Mg Tablet 60 Mg PO BID


[Folic Acid]   1 Mg PO DAILY


Multivitamin 1 Each Tablet 1 Each PO DAILY


Fish Oil 1,000 mg Softgel (Omega-3/Dha/Epa/Fish Oil) 1 Each Capsule 1 Each PO 

DAILY


Flomax (Tamsulosin HCl) 0.4 Mg Cap 0.4 Mg PO DAILY


     LAST FILLED 2020 #30 DAY SUPPLY


Neurontin (Gabapentin) 300 Mg Capsule 300 Mg PO TID


Eliquis (Apixaban) 5 Mg Tablet 5 Mg PO BID


Venlafaxine HCl ER (Venlafaxine HCl) 75 Mg Cap.er.24h 150 Mg PO DAILY


     TAKES 2 (75MG) CAPS


Finasteride 5 Mg Tablet 5 Mg PO 1200


Amlodipine Besylate 5 Mg Tablet 5 Mg PO DAILY


Metoprolol Tartrate 50 Mg Tablet 50 Mg PO BID


Digoxin 125 Mcg Tablet 125 Mcg PO DAILY





Instructions to patient/family


Please see electronic discharge instructions given to patient.





Clinical Quality Measures


DVT/VTE Risk/Contraindication:


Risk Factor Score Per Nursin


RFS Level Per Nursing on Admit:  4+=Very High











LUZ CHRISTENSEN MD               2020 09:12

## 2020-11-05 NOTE — NUR
CM/SS finalized discharge.



Plan: Patient will discharge to [facility] today 11/5. Transportation is set 
for  at 11:15 a.m. 



[facility]: Aleyda from the facility contacted this sw and reported that they have accepted patient 
for admission today. She requested that the patient be admitted today, fax updated clinical 
and labs, and have a Covid swab completed. The Covid swab is still pending at this time. 
CM/SS will fax results when available. CM/SS provided the nurse with the nursing report 
number. CM/SS informed Aide on  time. 



CM/SS contacted the patient's DPOA Robson to inform him of patient's acceptance to facility. 
He verbalized understanding. He did not have any further questions for us at this time. 



CM/SS visited with patient. He was lying in bed. He was calm, alert, and cooperative. The 
patient's Aide verbalized to this sw that the patient was willing to go to facility and was 
stated he needed to for his medications. CM/SS asked the patient if he was agreeable with 
plan for [facility]. Patient reported he was and wanted this sw to contact 
DPOA to inform him of transfer. 



CM/SS contacted the Hospital Transportation and spoke with Peg.  time set for 
11:15 a.m.

## 2020-11-25 ENCOUNTER — HOSPITAL ENCOUNTER (OUTPATIENT)
Dept: HOSPITAL 75 - GIR | Age: 75
End: 2020-11-25
Attending: NURSE PRACTITIONER
Payer: COMMERCIAL

## 2020-11-25 DIAGNOSIS — Z20.828: Primary | ICD-10-CM

## 2020-11-25 PROCEDURE — 87635 SARS-COV-2 COVID-19 AMP PRB: CPT

## 2020-12-04 ENCOUNTER — HOSPITAL ENCOUNTER (EMERGENCY)
Dept: HOSPITAL 75 - ER | Age: 75
LOS: 1 days | Discharge: HOME | End: 2020-12-05
Payer: MEDICARE

## 2020-12-04 VITALS — HEIGHT: 70 IN | BODY MASS INDEX: 28.63 KG/M2 | WEIGHT: 199.96 LBS

## 2020-12-04 DIAGNOSIS — I10: ICD-10-CM

## 2020-12-04 DIAGNOSIS — Z79.01: ICD-10-CM

## 2020-12-04 DIAGNOSIS — R40.2410: ICD-10-CM

## 2020-12-04 DIAGNOSIS — I48.91: ICD-10-CM

## 2020-12-04 DIAGNOSIS — F17.200: ICD-10-CM

## 2020-12-04 DIAGNOSIS — R29.6: Primary | ICD-10-CM

## 2020-12-04 LAB
ALBUMIN SERPL-MCNC: 4.2 GM/DL (ref 3.2–4.5)
ALP SERPL-CCNC: 73 U/L (ref 40–136)
ALT SERPL-CCNC: 23 U/L (ref 0–55)
BASOPHILS # BLD AUTO: 0.1 10^3/UL (ref 0–0.1)
BASOPHILS NFR BLD AUTO: 1 % (ref 0–10)
BILIRUB SERPL-MCNC: 0.8 MG/DL (ref 0.1–1)
BUN/CREAT SERPL: 4
CALCIUM SERPL-MCNC: 8.9 MG/DL (ref 8.5–10.1)
CHLORIDE SERPL-SCNC: 100 MMOL/L (ref 98–107)
CO2 SERPL-SCNC: 21 MMOL/L (ref 21–32)
CREAT SERPL-MCNC: 0.91 MG/DL (ref 0.6–1.3)
EOSINOPHIL # BLD AUTO: 0.1 10^3/UL (ref 0–0.3)
EOSINOPHIL NFR BLD AUTO: 1 % (ref 0–10)
GFR SERPLBLD BASED ON 1.73 SQ M-ARVRAT: > 60 ML/MIN
GLUCOSE SERPL-MCNC: 173 MG/DL (ref 70–105)
HCT VFR BLD CALC: 45 % (ref 40–54)
HGB BLD-MCNC: 15.5 G/DL (ref 13.3–17.7)
LYMPHOCYTES # BLD AUTO: 1.8 10^3/UL (ref 1–4)
LYMPHOCYTES NFR BLD AUTO: 33 % (ref 12–44)
MANUAL DIFFERENTIAL PERFORMED BLD QL: NO
MCH RBC QN AUTO: 32 PG (ref 25–34)
MCHC RBC AUTO-ENTMCNC: 35 G/DL (ref 32–36)
MCV RBC AUTO: 91 FL (ref 80–99)
MONOCYTES # BLD AUTO: 0.6 10^3/UL (ref 0–1)
MONOCYTES NFR BLD AUTO: 10 % (ref 0–12)
NEUTROPHILS # BLD AUTO: 3 10^3/UL (ref 1.8–7.8)
NEUTROPHILS NFR BLD AUTO: 54 % (ref 42–75)
PLATELET # BLD: 184 10^3/UL (ref 130–400)
PMV BLD AUTO: 9.7 FL (ref 9–12.2)
POTASSIUM SERPL-SCNC: 3.9 MMOL/L (ref 3.6–5)
PROT SERPL-MCNC: 7.8 GM/DL (ref 6.4–8.2)
SODIUM SERPL-SCNC: 144 MMOL/L (ref 135–145)
WBC # BLD AUTO: 5.5 10^3/UL (ref 4.3–11)

## 2020-12-04 PROCEDURE — 80053 COMPREHEN METABOLIC PANEL: CPT

## 2020-12-04 PROCEDURE — 72125 CT NECK SPINE W/O DYE: CPT

## 2020-12-04 PROCEDURE — 85025 COMPLETE CBC W/AUTO DIFF WBC: CPT

## 2020-12-04 PROCEDURE — 36415 COLL VENOUS BLD VENIPUNCTURE: CPT

## 2020-12-04 PROCEDURE — 70450 CT HEAD/BRAIN W/O DYE: CPT

## 2020-12-04 NOTE — ED FALL/INJURY
General


Stated Complaint:  FALLS


Source:  patient


Exam Limitations:  no limitations





History of Present Illness


Date Seen by Provider:  Dec 4, 2020


Time Seen by Provider:  22:47


Initial Comments


Patient presents to ER from home with chief complaint of falls. He has had 

multiple falls today but is not having any pain anywhere. He is having some mild

nausea. He has chronic alcoholism with frequent falls. He denies dysuria cough 

shortness of air fever chills.





Allergies and Home Medications


Allergies


Coded Allergies:  


     No Known Drug Allergies (Unverified , 5/29/20)





Home Medications


Acetaminophen 325 Mg Tablet, 650 MG PO Q8H PRN for PAIN-MILD (1-4), (Reported)


Amlodipine Besylate 5 Mg Tablet, 5 MG PO DAILY, (Reported)


Apixaban 5 Mg Tablet, 5 MG PO BID, (Reported)


Digoxin 125 Mcg Tablet, 125 MCG PO DAILY, (Reported)


Diltiazem HCl 60 Mg Tablet, 60 MG PO BID, (Reported)


Finasteride 5 Mg Tablet, 5 MG PO 1200, (Reported)


Gabapentin 300 Mg Capsule, 300 MG PO TID, (Reported)


Metoprolol Tartrate 50 Mg Tablet, 50 MG PO BID, (Reported)


Multivitamin 1 Each Tablet, 1 EACH PO DAILY, (Reported)


Omega-3/Dha/Epa/Fish Oil 1 Each Capsule, 1 EACH PO DAILY, (Reported)


Pantoprazole Sodium 40 Mg Tablet.dr, 40 MG PO DAILY, (Reported)


   LAST FILLED 09- #30/30 DAY SUPPLY 


Tamsulosin HCl 0.4 Mg Cap, 0.4 MG PO DAILY, (Reported)


   LAST FILLED 08- #30/30 DAY SUPPLY 


Venlafaxine HCl 75 Mg Cap.er.24h, 150 MG PO DAILY, (Reported)


   TAKES 2 (75MG) CAPS 


[Folic Acid]  , 1 MG PO DAILY, (Reported)





Patient Home Medication List


Home Medication List Reviewed:  Yes





Review of Systems


Review of Systems


Constitutional:  No chills, No diaphoresis


Eyes:  Denies Blindness, Denies Drainage


Ears, Nose, Mouth, Throat:  denies ear pain, denies nose pain


Respiratory:  No cough, No short of breath


Cardiovascular:  No edema, No palpitations


Gastrointestinal:  No abdominal pain; nausea; No vomiting


Genitourinary:  No discharge, No dysuria


Musculoskeletal:  No back pain, No joint pain





All Other Systems Reviewed


Negative Unless Noted:  Yes





Past Medical-Social-Family Hx


Patient Social History


Alcohol Use:  Regular Use


Alcohol Beverage of Choice:  Cheap Liquor


Recreational Drug Use:  No


Smoking Status:  Current Everyday Smoker


Type Used:  Pipe





Immunizations Up To Date


Tetanus Booster (TDap):  Less than 5yrs


PED Vaccines UTD:  Yes


Date of Pneumonia Vaccine:  Oct 29, 2019





Past Medical History


Surgeries:  Yes (BACK SURGERY-DR. REESE)


Orthopedic, Tonsillectomy


Respiratory:  No


Cardiac:  Yes


Atrial Fibrillation, Hypertension


Neurological:  No (? NEUROPATHY OR RESTLESS LEGS ? )


Genitourinary:  Yes (PROSTATE PROBLEMS PER MEDICATION LIST-FLOMAX AND 

FINASTERIDE)


Prostate Problems, Bladder Infection


Gastrointestinal:  No


Musculoskeletal:  Yes (S/P BACK SURGERY WITH HARDWARE IN PLACE)


Chronic Back Pain


Endocrine:  No


HEENT:  No


Cancer:  No


Psychosocial:  Yes (ON EFFEXOR--UNKNOWN DX )


Integumentary:  No


Blood Disorders:  No





Family Medical History





SOCIAL HISTORY: 


-DRINKS "AT LEAST 2 HIGHBALLS EVERY NIGHT" 


-DENIES DRUG USE


-SMOKED A PIPE-STATES HE QUIT IN 1975





Physical Exam


Vital Signs





Vital Signs - First Documented








 12/4/20





 22:45


 


Temp 36.6


 


Pulse 117


 


Resp 14


 


B/P (MAP) 157/114 (128)


 


Pulse Ox 97





Capillary Refill :


Height, Weight, BMI


Height: '"


Weight: lbs. oz. kg; 30.49 BMI


Method:


General Appearance:  WD/WN, no apparent distress


HEENT:  PERRL/EOMI, normal ENT inspection, pharynx normal


Neck:  full range of motion, supple, normal inspection


Cardiovascular:  normal peripheral pulses, regular rate, rhythm


Respiratory:  lungs clear, normal breath sounds, no respiratory distress, no 

accessory muscle use


Peripheral Pulses:  2+ Radial Pulses (R), 2+ Radial Pulses (L)


Gastrointestinal:  normal bowel sounds, non tender, soft


Extremities:  non-tender, normal inspection, normal capillary refill


Neurologic/Psychiatric:  alert, normal mood/affect, oriented x 3


Skin:  normal color, warm/dry





[place] Coma Score


Best Eye Response:  (4) Open Spontaneously


Best Verbal Response:  (5) Oriented


Best Motor Response:  (6) Obeys Commands


Daniel Total:  15





Progress/Results/Core Measures


Results/Orders


Lab Results





Laboratory Tests








Test


 12/4/20


22:50 Range/Units


 


 


White Blood Count


 5.5 


 4.3-11.0


10^3/uL


 


Red Blood Count


 4.87 


 4.30-5.52


10^6/uL


 


Hemoglobin 15.5  13.3-17.7  g/dL


 


Hematocrit 45  40-54  %


 


Mean Corpuscular Volume 91  80-99  fL


 


Mean Corpuscular Hemoglobin 32  25-34  pg


 


Mean Corpuscular Hemoglobin


Concent 35 


 32-36  g/dL





 


Red Cell Distribution Width 12.7  10.0-14.5  %


 


Platelet Count


 184 


 130-400


10^3/uL


 


Mean Platelet Volume 9.7  9.0-12.2  fL


 


Immature Granulocyte % (Auto) 0   %


 


Neutrophils (%) (Auto) 54  42-75  %


 


Lymphocytes (%) (Auto) 33  12-44  %


 


Monocytes (%) (Auto) 10  0-12  %


 


Eosinophils (%) (Auto) 1  0-10  %


 


Basophils (%) (Auto) 1  0-10  %


 


Neutrophils # (Auto)


 3.0 


 1.8-7.8


10^3/uL


 


Lymphocytes # (Auto)


 1.8 


 1.0-4.0


10^3/uL


 


Monocytes # (Auto)


 0.6 


 0.0-1.0


10^3/uL


 


Eosinophils # (Auto)


 0.1 


 0.0-0.3


10^3/uL


 


Basophils # (Auto)


 0.1 


 0.0-0.1


10^3/uL


 


Immature Granulocyte # (Auto)


 0.0 


 0.0-0.1


10^3/uL


 


Sodium Level 144  135-145  MMOL/L


 


Potassium Level 3.9  3.6-5.0  MMOL/L


 


Chloride Level 100    MMOL/L


 


Carbon Dioxide Level 21  21-32  MMOL/L


 


Anion Gap 23 H 5-14  MMOL/L


 


Blood Urea Nitrogen 4 L 7-18  MG/DL


 


Creatinine


 0.91 


 0.60-1.30


MG/DL


 


Estimat Glomerular Filtration


Rate > 60 


  





 


BUN/Creatinine Ratio 4   


 


Glucose Level 173 H   MG/DL


 


Calcium Level 8.9  8.5-10.1  MG/DL


 


Corrected Calcium 8.7  8.5-10.1  MG/DL


 


Total Bilirubin 0.8  0.1-1.0  MG/DL


 


Aspartate Amino Transf


(AST/SGOT) 41 H


 5-34  U/L





 


Alanine Aminotransferase


(ALT/SGPT) 23 


 0-55  U/L





 


Alkaline Phosphatase 73    U/L


 


Total Protein 7.8  6.4-8.2  GM/DL


 


Albumin 4.2  3.2-4.5  GM/DL








My Orders





Orders - BRENDEN DEE


Ct Head/Cervical Spine Wo (12/4/20 22:45)


Ed Iv/Invasive Line Start (12/4/20 22:45)


Lactated Ringers (Lr 1000 Ml Iv Solution (12/4/20 22:45)


Cbc With Automated Diff (12/4/20 22:45)


Comprehensive Metabolic Panel (12/4/20 22:45)


Ondansetron Injection (Zofran Injectio (12/4/20 22:45)





Medications Given in ED





Current Medications








 Medications  Dose


 Ordered  Sig/Ortiz


 Route  Start Time


 Stop Time Status Last Admin


Dose Admin


 


 Lactated Ringer's  1,000 ml @ 


 0 mls/hr  Q0M ONCE


 IV  12/4/20 22:45


 12/4/20 22:48 DC 12/4/20 23:00


999 MLS/HR


 


 Ondansetron HCl  4 mg  ONCE  ONCE


 IVP  12/4/20 22:45


 12/4/20 22:48 DC 12/4/20 23:00


4 MG








Vital Signs/I&O











 12/4/20





 22:45


 


Temp 36.6


 


Pulse 117


 


Resp 14


 


B/P (MAP) 157/114 (128)


 


Pulse Ox 97











Progress


Progress Note #1:  


   Time:  22:53


Progress Note


Basic labs. The patient is not expressing pain anywhere. Liter fluids, CT of the

head and C-spine. Vital signs are normal.


Progress Note #2:  


   Time:  00:04


Progress Note


Pt is ambulatory and eager to go home. He wants his Nephew to take him home with

does not have a phone number for him. We called dispatch and got just in 

[place]'s number and he agrees to be here in about 30-45 minutes to pick the 

patient up.


Initial ECG Impression Date:  Dec 4, 2020


Initial ECG Impression Time:  22:55


Initial ECG Rate:  100


Initial ECG Rhythm:  A Fib/Flutter


Initial ECG Intervals:  QT (443)


Initial ECG Impression:  Atrial Fibrillation


Comment


Atrial fibrillation without rapid ventricular response. No clinically relevant 

ST changes.





Diagnostic Imaging





   Diagonstic Imaging:  CT


   Plain Films/CT/US/NM/MRI:  c-spine, head


Comments


No intracranial hemorrhage or other acute intracranial abnormality.


Global parenchymal volume loss with chronic microvascular ischemic changes.


No fracture or traumatic malalignment of the cervical spine.


   Reviewed:  Reviewed by Me





Departure


Impression





   Primary Impression:  


   History of recent fall


Disposition:  01 HOME, SELF-CARE


Condition:  Stable





Departure-Patient Inst.


Decision time for Depature:  23:40


Referrals:  


NAM THOMAS DO (PCP/Family)


Primary Care Physician


Patient Instructions:  Getting Up From a Fall





Add. Discharge Instructions:  


Drink some extra fluids over the next day or so as you're mildly dehydrated. We 

did give her a bag of IV fluids.


Follow-up with your primary care doctor if you're having further problems on 

Monday.


Return to the ER if you're having intractable pain, confusion or other worrisome

symptoms.











BRENDEN DEE J                  Dec 4, 2020 22:53

## 2020-12-05 VITALS — SYSTOLIC BLOOD PRESSURE: 145 MMHG | DIASTOLIC BLOOD PRESSURE: 109 MMHG

## 2021-09-29 ENCOUNTER — HOSPITAL ENCOUNTER (EMERGENCY)
Dept: HOSPITAL 75 - ER | Age: 76
Discharge: HOME | End: 2021-09-29
Payer: MEDICARE

## 2021-09-29 VITALS — WEIGHT: 200.62 LBS | HEIGHT: 70.08 IN | BODY MASS INDEX: 28.72 KG/M2

## 2021-09-29 VITALS — SYSTOLIC BLOOD PRESSURE: 130 MMHG | DIASTOLIC BLOOD PRESSURE: 72 MMHG

## 2021-09-29 DIAGNOSIS — Z79.899: ICD-10-CM

## 2021-09-29 DIAGNOSIS — I48.91: ICD-10-CM

## 2021-09-29 DIAGNOSIS — Z79.01: ICD-10-CM

## 2021-09-29 DIAGNOSIS — Z91.89: ICD-10-CM

## 2021-09-29 DIAGNOSIS — I10: ICD-10-CM

## 2021-09-29 DIAGNOSIS — F03.90: Primary | ICD-10-CM

## 2021-09-29 LAB
ALBUMIN SERPL-MCNC: 3.3 GM/DL (ref 3.2–4.5)
ALP SERPL-CCNC: 68 U/L (ref 40–136)
ALT SERPL-CCNC: 16 U/L (ref 0–55)
APTT PPP: YELLOW S
BACTERIA #/AREA URNS HPF: NEGATIVE /HPF
BARBITURATES UR QL: NEGATIVE
BASOPHILS # BLD AUTO: 0.1 10^3/UL (ref 0–0.1)
BASOPHILS NFR BLD AUTO: 1 % (ref 0–10)
BENZODIAZ UR QL SCN: NEGATIVE
BILIRUB SERPL-MCNC: 1.6 MG/DL (ref 0.1–1)
BILIRUB UR QL STRIP: NEGATIVE
BUN/CREAT SERPL: 7
CALCIUM SERPL-MCNC: 8.3 MG/DL (ref 8.5–10.1)
CHLORIDE SERPL-SCNC: 103 MMOL/L (ref 98–107)
CO2 SERPL-SCNC: 22 MMOL/L (ref 21–32)
COCAINE UR QL: NEGATIVE
CREAT SERPL-MCNC: 1.3 MG/DL (ref 0.6–1.3)
EOSINOPHIL # BLD AUTO: 0.1 10^3/UL (ref 0–0.3)
EOSINOPHIL NFR BLD AUTO: 1 % (ref 0–10)
FIBRINOGEN PPP-MCNC: CLEAR MG/DL
GFR SERPLBLD BASED ON 1.73 SQ M-ARVRAT: 53 ML/MIN
GLUCOSE SERPL-MCNC: 146 MG/DL (ref 70–105)
GLUCOSE UR STRIP-MCNC: NEGATIVE MG/DL
HCT VFR BLD CALC: 44 % (ref 40–54)
HGB BLD-MCNC: 15.2 G/DL (ref 13.3–17.7)
KETONES UR QL STRIP: NEGATIVE
LEUKOCYTE ESTERASE UR QL STRIP: NEGATIVE
LYMPHOCYTES # BLD AUTO: 1.4 10^3/UL (ref 1–4)
LYMPHOCYTES NFR BLD AUTO: 23 % (ref 12–44)
MANUAL DIFFERENTIAL PERFORMED BLD QL: NO
MCH RBC QN AUTO: 33 PG (ref 25–34)
MCHC RBC AUTO-ENTMCNC: 35 G/DL (ref 32–36)
MCV RBC AUTO: 94 FL (ref 80–99)
METHADONE UR QL SCN: NEGATIVE
METHAMPHETAMINE SCREEN URINE S: NEGATIVE
MONOCYTES # BLD AUTO: 0.9 10^3/UL (ref 0–1)
MONOCYTES NFR BLD AUTO: 15 % (ref 0–12)
NEUTROPHILS # BLD AUTO: 3.5 10^3/UL (ref 1.8–7.8)
NEUTROPHILS NFR BLD AUTO: 59 % (ref 42–75)
NITRITE UR QL STRIP: NEGATIVE
OPIATES UR QL SCN: NEGATIVE
OXYCODONE UR QL: NEGATIVE
PH UR STRIP: 6.5 [PH] (ref 5–9)
PLATELET # BLD: 166 10^3/UL (ref 130–400)
PMV BLD AUTO: 10.6 FL (ref 9–12.2)
POTASSIUM SERPL-SCNC: 3.7 MMOL/L (ref 3.6–5)
PROPOXYPH UR QL: NEGATIVE
PROT SERPL-MCNC: 6.6 GM/DL (ref 6.4–8.2)
PROT UR QL STRIP: NEGATIVE
RBC #/AREA URNS HPF: (no result) /HPF
SODIUM SERPL-SCNC: 139 MMOL/L (ref 135–145)
SP GR UR STRIP: <=1.005 (ref 1.02–1.02)
SQUAMOUS #/AREA URNS HPF: (no result) /HPF
TRICYCLICS UR QL SCN: NEGATIVE
WBC # BLD AUTO: 5.9 10^3/UL (ref 4.3–11)
WBC #/AREA URNS HPF: (no result) /HPF

## 2021-09-29 PROCEDURE — 99284 EMERGENCY DEPT VISIT MOD MDM: CPT

## 2021-09-29 PROCEDURE — 80053 COMPREHEN METABOLIC PANEL: CPT

## 2021-09-29 PROCEDURE — 80306 DRUG TEST PRSMV INSTRMNT: CPT

## 2021-09-29 PROCEDURE — 81000 URINALYSIS NONAUTO W/SCOPE: CPT

## 2021-09-29 PROCEDURE — 80320 DRUG SCREEN QUANTALCOHOLS: CPT

## 2021-09-29 PROCEDURE — 36415 COLL VENOUS BLD VENIPUNCTURE: CPT

## 2021-09-29 PROCEDURE — 85025 COMPLETE CBC W/AUTO DIFF WBC: CPT

## 2021-09-29 NOTE — ED GENERAL
General


Chief Complaint:  Hip/Pelvic Problems


Stated Complaint:  FALL


Nursing Triage Note:  


Pt to ED by EMS from home with report of fall and hip pain.  EMS reports pt did 


not hit his head, no LOC, walked to their cart when they picked him up, and that




the pt was seen at [place] [facility] for falls over the last few days.  Pt has dementia.




 When asked what happened, pt replied "I don't know."  When asked what's wrong, 


the pt stated "I don't know. Nothing."  When asked if the pt was in any pain, pt




stated "no."  


 (ZANE VALENCIA)





History of Present Illness


Date Seen by Provider:  Sep 29, 2021


Time Seen by Provider:  12:15


Initial Comments


76 year old male brought by EMS. He is unsure why he was brought here, said he 

sits on the floor to play with the dog and he sleeps on the floor. When others 

find him on the floor, they assume he has fallen and call EMS. He isn't sure who

called EMS today. He denies any pain. He has been getting in and out of bed, 

independently. He can state his full name, season as fall, but can't state year 

or month. He states he is at [facility]. Niece reports that police found hi

m wandering outside his home and confused, so EMS was called. He is ambulatory, 

with no pain in legs or hips. No bruises or injuries noted. 





He was evaluated 3 times in the last 2 days at [facility] for similar 

issues. Family had him at [facility] last year and then he returned home. He has

caregivers present at night but not always during the day. The niece and nephew 

are POAs and they are trying to obtain guardianship, as he usually tries to 

leave the nursing home after being admitted. He is brought here in a pair of 

underwear and a shirt. No pants or shoes.


Associated Systoms:  Denies Symptoms (ZANE VALENCIA)





Allergies and Home Medications


Allergies


Coded Allergies:  


     No Known Drug Allergies (Unverified , 5/29/20)





Patient Home Medication List


Home Medication List Reviewed:  Yes


 (ZANE VALENCIA)


Acetaminophen (Tylenol) 325 Mg Tablet, 650 MG PO Q8H PRN for PAIN-MILD (1-4), 

(Reported)


   Entered as Reported by: PAT WELLINGTON on 11/3/20 1038


Amlodipine Besylate (Amlodipine Besylate) 5 Mg Tablet, 5 MG PO DAILY, (Reported)


   Entered as Reported by: KURT ESQUIVEL on 5/29/20 0357


Apixaban (Eliquis) 5 Mg Tablet, 5 MG PO BID, (Reported)


   Entered as Reported by: KURT ESQUIVEL on 5/29/20 0357


Digoxin (Digoxin) 125 Mcg Tablet, 125 MCG PO DAILY, (Reported)


   Entered as Reported by: KURT ESQUIVEL on 5/29/20 0357


Diltiazem HCl (Diltiazem HCl) 60 Mg Tablet, 60 MG PO BID, (Reported)


   Entered as Reported by: PAT WELLINGTON on 11/3/20 1038


Finasteride (Finasteride) 5 Mg Tablet, 5 MG PO 1200, (Reported)


   Entered as Reported by: KURT ESQUIVEL on 5/29/20 0357


Gabapentin (Neurontin) 300 Mg Capsule, 300 MG PO TID, (Reported)


   Entered as Reported by: KURT ESQUIVEL on 5/29/20 0357


Metoprolol Tartrate (Metoprolol Tartrate) 50 Mg Tablet, 50 MG PO BID, (Reported)


   Entered as Reported by: KURT ESQUIVEL on 5/29/20 0357


Multivitamin (Multivitamin) 1 Each Tablet, 1 EACH PO DAILY, (Reported)


   Entered as Reported by: PAT WELLINGTON on 5/29/20 1510


Omega-3/Dha/Epa/Fish Oil (Fish Oil 1,000 mg Softgel) 1 Each Capsule, 1 EACH PO 

DAILY, (Reported)


   Entered as Reported by: PAT WELLINGTON on 5/29/20 1510


Pantoprazole Sodium (Pantoprazole Sodium) 40 Mg Tablet.dr, 40 MG PO DAILY, 

(Reported)


   Entered as Reported by: PAT WELLINGTON on 11/3/20 1038


Tamsulosin HCl (Flomax) 0.4 Mg Cap, 0.4 MG PO DAILY, (Reported)


   Entered as Reported by: KURT ESQUIVEL on 5/29/20 0357


Venlafaxine HCl (Venlafaxine HCl ER) 75 Mg Cap.er.24h, 150 MG PO DAILY, 

(Reported)


   Entered as Reported by: KURT ESQUIVEL on 5/29/20 0357


[Folic Acid]  , 1 MG PO DAILY, (Reported)


   Entered as Reported by: PAT WELLINGTON on 11/3/20 1038





Review of Systems


Review of Systems


Constitutional:  no symptoms reported, see HPI


Respiratory:  no symptoms reported, see HPI


Cardiovascular:  no symptoms reported, see HPI


Gastrointestinal:  no symptoms reported, see HPI


Skin:  no symptoms reported, see HPI


Psychiatric/Neurological:  No Symptoms Reported, See HPI (ZANE VALENCIA)





All Other Systems Reviewed


Negative Unless Noted:  Yes


 (ZANE VALENCIA)





Past Medical-Social-Family Hx


Immunizations Up To Date


Tetanus Booster (TDap):  Less than 5yrs


PED Vaccines UTD:  Yes


 (ZANE VALENCIA)





Past Medical History


Surgeries:  Yes (BACK SURGERY-DR. REESE)


Orthopedic, Tonsillectomy


Respiratory:  No


Cardiac:  Yes


Atrial Fibrillation, Hypertension


Neurological:  No (? NEUROPATHY OR RESTLESS LEGS ? )


Genitourinary:  Yes (PROSTATE PROBLEMS PER MEDICATION LIST-FLOMAX AND 

FINASTERIDE)


Prostate Problems, Bladder Infection


Gastrointestinal:  No


Musculoskeletal:  Yes (S/P BACK SURGERY WITH HARDWARE IN PLACE)


Chronic Back Pain


Endocrine:  No


HEENT:  No


Cancer:  No


Psychosocial:  Yes (ON EFFEXOR--UNKNOWN DX )


Integumentary:  No


Blood Disorders:  No


 (ZANE VALENCIA)





Family Medical History


Reviewed Nursing Family Hx


 (ZANE VALENCIA)





SOCIAL HISTORY: 


-DRINKS "AT LEAST 2 HIGHBALLS EVERY NIGHT" 


-DENIES DRUG USE


-SMOKED A PIPE-STATES HE QUIT IN 1975


 (ZANE VALENCIA)





Physical Exam


Vital Signs





Vital Signs - First Documented








 9/29/21 9/29/21





 12:26 18:12


 


Temp 36.8 


 


Pulse 84 


 


Resp 18 


 


B/P (MAP) 138/102 (114) 


 


Pulse Ox  98


 


O2 Delivery  Room Air








 (OBI SAUER MD)


Vital Signs


Capillary Refill : Less Than 3 Seconds 


 (ZANE VALENCIA)


Height, Weight, BMI


Height: '"


Weight: lbs. oz. kg; 28.00 BMI


Method:


General Appearance:  No Apparent Distress, WD/WN


Eyes:  Bilateral Eye Normal Inspection, Bilateral Eye PERRL


HEENT:  PERRL/EOMI, TMs Normal, Normal ENT Inspection, Pharynx Normal


Neck:  Full Range of Motion, Normal Inspection, Non Tender, Supple


Respiratory:  Chest Non Tender, Lungs Clear, Normal Breath Sounds


Cardiovascular:  Regular Rate, Rhythm, No Edema, No Murmur, Normal Peripheral 

Pulses


Gastrointestinal:  Normal Bowel Sounds, Non Tender, Soft


Extremity:  Normal Capillary Refill, Normal Inspection, Normal Range of Motion, 

Non Tender, No Calf Tenderness, No Pedal Edema


Neurologic/Psychiatric:  Alert, No Motor/Sensory Deficits, Normal Mood/Affect, 

Other (Oriented to person, place and season)


Skin:  Normal Color, Warm/Dry; No Ecchymosis (ZANE VALENCIA)





Progress/Results/Core Measures


Suspected Sepsis


SIRS


Temperature: 


Pulse: 84 


Respiratory Rate: 18


 


Laboratory Tests


9/29/21 12:00: White Blood Count 5.9


Blood Pressure 138 /102 


Mean: 114


 





Laboratory Tests


9/29/21 12:00: 


Creatinine 1.30, Platelet Count 166, Total Bilirubin 1.6H


 (ZANE VALENCIA)





Results/Orders


Lab Results





Laboratory Tests








Test


 9/29/21


12:00 9/29/21


14:02 Range/Units


 


 


White Blood Count


 5.9 


 


 4.3-11.0


10^3/uL


 


Red Blood Count


 4.67 


 


 4.30-5.52


10^6/uL


 


Hemoglobin 15.2   13.3-17.7  g/dL


 


Hematocrit 44   40-54  %


 


Mean Corpuscular Volume 94   80-99  fL


 


Mean Corpuscular Hemoglobin 33   25-34  pg


 


Mean Corpuscular Hemoglobin


Concent 35 


 


 32-36  g/dL





 


Red Cell Distribution Width 12.3   10.0-14.5  %


 


Platelet Count


 166 


 


 130-400


10^3/uL


 


Mean Platelet Volume 10.6   9.0-12.2  fL


 


Immature Granulocyte % (Auto) 0    %


 


Neutrophils (%) (Auto) 59   42-75  %


 


Lymphocytes (%) (Auto) 23   12-44  %


 


Monocytes (%) (Auto) 15 H  0-12  %


 


Eosinophils (%) (Auto) 1   0-10  %


 


Basophils (%) (Auto) 1   0-10  %


 


Neutrophils # (Auto)


 3.5 


 


 1.8-7.8


10^3/uL


 


Lymphocytes # (Auto)


 1.4 


 


 1.0-4.0


10^3/uL


 


Monocytes # (Auto)


 0.9 


 


 0.0-1.0


10^3/uL


 


Eosinophils # (Auto)


 0.1 


 


 0.0-0.3


10^3/uL


 


Basophils # (Auto)


 0.1 


 


 0.0-0.1


10^3/uL


 


Immature Granulocyte # (Auto)


 0.0 


 


 0.0-0.1


10^3/uL


 


Sodium Level 139   135-145  MMOL/L


 


Potassium Level 3.7   3.6-5.0  MMOL/L


 


Chloride Level 103     MMOL/L


 


Carbon Dioxide Level 22   21-32  MMOL/L


 


Anion Gap 14   5-14  MMOL/L


 


Blood Urea Nitrogen 9   7-18  MG/DL


 


Creatinine


 1.30 


 


 0.60-1.30


MG/DL


 


Estimat Glomerular Filtration


Rate 53 


 


  





 


BUN/Creatinine Ratio 7    


 


Glucose Level 146 H    MG/DL


 


Calcium Level 8.3 L  8.5-10.1  MG/DL


 


Corrected Calcium 8.9   8.5-10.1  MG/DL


 


Total Bilirubin 1.6 H  0.1-1.0  MG/DL


 


Aspartate Amino Transf


(AST/SGOT) 34 


 


 5-34  U/L





 


Alanine Aminotransferase


(ALT/SGPT) 16 


 


 0-55  U/L





 


Alkaline Phosphatase 68     U/L


 


Total Protein 6.6   6.4-8.2  GM/DL


 


Albumin 3.3   3.2-4.5  GM/DL


 


Serum Alcohol < 10   <10  MG/DL


 


Urine Color  YELLOW   


 


Urine Clarity  CLEAR   


 


Urine pH  6.5  5-9  


 


Urine Specific Gravity  <=1.005  1.016-1.022  


 


Urine Protein  NEGATIVE  NEGATIVE  


 


Urine Glucose (UA)  NEGATIVE  NEGATIVE  


 


Urine Ketones  NEGATIVE  NEGATIVE  


 


Urine Nitrite  NEGATIVE  NEGATIVE  


 


Urine Bilirubin  NEGATIVE  NEGATIVE  


 


Urine Urobilinogen  0.2  < = 1.0  MG/DL


 


Urine Leukocyte Esterase  NEGATIVE  NEGATIVE  


 


Urine RBC (Auto)  NEGATIVE  NEGATIVE  


 


Urine RBC  NONE   /HPF


 


Urine WBC  NONE   /HPF


 


Urine Squamous Epithelial


Cells 


 0-2 


  /HPF





 


Urine Crystals  NONE   /LPF


 


Urine Bacteria  NEGATIVE   /HPF


 


Urine Casts  NONE   /LPF


 


Urine Mucus  NEGATIVE   /LPF


 


Urine Culture Indicated  NO   


 


Urine Opiates Screen  NEGATIVE  NEGATIVE  


 


Urine Oxycodone Screen  NEGATIVE  NEGATIVE  


 


Urine Methadone Screen  NEGATIVE  NEGATIVE  


 


Urine Propoxyphene Screen  NEGATIVE  NEGATIVE  


 


Urine Barbiturates Screen  NEGATIVE  NEGATIVE  


 


Ur Tricyclic Antidepressants


Screen 


 NEGATIVE 


 NEGATIVE  





 


Urine Phencyclidine Screen  NEGATIVE  NEGATIVE  


 


Urine Amphetamines Screen  NEGATIVE  NEGATIVE  


 


Urine Methamphetamines Screen  NEGATIVE  NEGATIVE  


 


Urine Benzodiazepines Screen  NEGATIVE  NEGATIVE  


 


Urine Cocaine Screen  NEGATIVE  NEGATIVE  


 


Urine Cannabinoids Screen  NEGATIVE  NEGATIVE  





 (OBI SAUER MD)


Vital Signs/I&O











 9/29/21 9/29/21





 12:26 18:12


 


Temp 36.8 


 


Pulse 84 82


 


Resp 18 17


 


B/P (MAP) 138/102 (114) 130/72


 


Pulse Ox  98


 


O2 Delivery  Room Air








 (OBI SAUER MD)


Vital Signs/I&O


Capillary Refill : Less Than 3 Seconds 


 (ZANE VALENCIA)








Blood Pressure Mean:                    114








Progress Note :  


   Time:  12:15


Progress Note


Patient seen and evaluated, will obtain labs and discuss options in care with 

his niece and nephew.  They would like him placed at the [facility].  Referral has been placed, labs will be faxed when available.


1300 patient has been cooperative and remained in bed.  He has had no periods of

confusion or agitation.  He is requesting discharge to home.


1400 tried behavioral health reports no beds available at this time and an 

extensive waiting list.  This was discussed with the patient and his niece.  We 

will consider other options. Patient eating lunch no complaints. 


1500 consult placed with [facility], no beds available for transfer for at this 

time.


1530 spoke to [employer], [facility], they do have beds available but it would 

be tomorrow before they could accept him.


1600 spoke with patient and his nephew on speaker phone. Agreeable with plan for

d/c to home, he has caregiver available for tonight and then they will work with

[facility] for admission there tomorrow 


1700 Awaiting taxi for transportation home. Nephew has pre-paid for the taxi. 


 (ZANE VALENCIA)





Departure


Impression





   Primary Impression:  


   History of dementia


   Additional Impression:  


   At high risk for elopement


Disposition:  01 HOME, SELF-CARE


Condition:  Stable





Departure-Patient Inst.


Decision time for Depature:  16:00


 (ZANE VALENCIA)


Referrals:  


NAM THOMAS DO (PCP/Family)


Primary Care Physician


Patient Instructions:  Dementia (DC), Preventing Falls in the Older Adult





Add. Discharge Instructions:  


Call [facility] [phone], to arrange admission tomorrow. 


Have family or caregiver stay with him tonight and tomorrow, until he can be 

admitted to the nursing home. 


Continue home medications. 


Activity as tolerated. 





All discharge instructions reviewed with patient and/or family. Voiced 

understanding.








ATTENDING PHYSICIAN NOTE:


I was physically present as attending physician in the emergency department 

during the care of this patient, but I was not directly involved in the decision

making or delivery of care for this patient. 


 (OBI SAUER MD)











ZANE VALENCIA                  Sep 29, 2021 13:39


OBI SAUER MD        Sep 30, 2021 12:05

## 2021-10-05 ENCOUNTER — HOSPITAL ENCOUNTER (EMERGENCY)
Dept: HOSPITAL 75 - ER | Age: 76
Discharge: HOME | End: 2021-10-05
Payer: MEDICARE

## 2021-10-05 VITALS — WEIGHT: 200.62 LBS | BODY MASS INDEX: 28.72 KG/M2 | HEIGHT: 70 IN

## 2021-10-05 VITALS — DIASTOLIC BLOOD PRESSURE: 87 MMHG | SYSTOLIC BLOOD PRESSURE: 119 MMHG

## 2021-10-05 DIAGNOSIS — Z79.01: ICD-10-CM

## 2021-10-05 DIAGNOSIS — I48.91: ICD-10-CM

## 2021-10-05 DIAGNOSIS — F10.129: ICD-10-CM

## 2021-10-05 DIAGNOSIS — I10: ICD-10-CM

## 2021-10-05 DIAGNOSIS — Z79.899: ICD-10-CM

## 2021-10-05 DIAGNOSIS — R04.0: Primary | ICD-10-CM

## 2021-10-05 LAB
APTT BLD: 41 SEC (ref 24–35)
BARBITURATES UR QL: NEGATIVE
BASOPHILS # BLD AUTO: 0.1 10^3/UL (ref 0–0.1)
BASOPHILS NFR BLD AUTO: 2 % (ref 0–10)
BENZODIAZ UR QL SCN: NEGATIVE
COCAINE UR QL: NEGATIVE
EOSINOPHIL # BLD AUTO: 0.2 10^3/UL (ref 0–0.3)
EOSINOPHIL NFR BLD AUTO: 3 % (ref 0–10)
HCT VFR BLD CALC: 39 % (ref 40–54)
HGB BLD-MCNC: 13.9 G/DL (ref 13.3–17.7)
INR PPP: 1.1 (ref 0.8–1.4)
LYMPHOCYTES # BLD AUTO: 1.3 10^3/UL (ref 1–4)
LYMPHOCYTES NFR BLD AUTO: 24 % (ref 12–44)
MANUAL DIFFERENTIAL PERFORMED BLD QL: NO
MCH RBC QN AUTO: 33 PG (ref 25–34)
MCHC RBC AUTO-ENTMCNC: 35 G/DL (ref 32–36)
MCV RBC AUTO: 93 FL (ref 80–99)
METHADONE UR QL SCN: NEGATIVE
METHAMPHETAMINE SCREEN URINE S: NEGATIVE
MONOCYTES # BLD AUTO: 0.7 10^3/UL (ref 0–1)
MONOCYTES NFR BLD AUTO: 13 % (ref 0–12)
NEUTROPHILS # BLD AUTO: 3.3 10^3/UL (ref 1.8–7.8)
NEUTROPHILS NFR BLD AUTO: 59 % (ref 42–75)
OPIATES UR QL SCN: NEGATIVE
OXYCODONE UR QL: NEGATIVE
PLATELET # BLD: 181 10^3/UL (ref 130–400)
PMV BLD AUTO: 9.8 FL (ref 9–12.2)
PROPOXYPH UR QL: NEGATIVE
PROTHROMBIN TIME: 15 SEC (ref 12.2–14.7)
TRICYCLICS UR QL SCN: NEGATIVE
WBC # BLD AUTO: 5.5 10^3/UL (ref 4.3–11)

## 2021-10-05 PROCEDURE — 85025 COMPLETE CBC W/AUTO DIFF WBC: CPT

## 2021-10-05 PROCEDURE — 85610 PROTHROMBIN TIME: CPT

## 2021-10-05 PROCEDURE — 80320 DRUG SCREEN QUANTALCOHOLS: CPT

## 2021-10-05 PROCEDURE — 36415 COLL VENOUS BLD VENIPUNCTURE: CPT

## 2021-10-05 PROCEDURE — 85730 THROMBOPLASTIN TIME PARTIAL: CPT

## 2021-10-05 PROCEDURE — 99283 EMERGENCY DEPT VISIT LOW MDM: CPT

## 2021-10-05 PROCEDURE — 80306 DRUG TEST PRSMV INSTRMNT: CPT

## 2021-10-05 NOTE — ED EENT
History of Present Illness


General


Chief Complaint:  Nasal Problems


Stated Complaint:  NOSE BLEED


Nursing Triage Note:  


PT ARRIVES TO ER BY [facility] WITH C/O NOSE BLEED THAT HE THINKS STARTED AROUND 


0130 THIS MORNING. PT STATES THAT NO TRAUMA OCCURED TO MAKE IT BLEED.





Allergies and Home Medications


Allergies


Coded Allergies:  


     No Known Drug Allergies (Unverified , 5/29/20)





Patient Home Medication List


Acetaminophen (Tylenol) 325 Mg Tablet, 650 MG PO Q8H PRN for PAIN-MILD (1-4), 

(Reported)


   Entered as Reported by: PAT WELLINGTON on 11/3/20 1038


Amlodipine Besylate (Amlodipine Besylate) 5 Mg Tablet, 5 MG PO DAILY, (Reported)


   Entered as Reported by: KURT ESQUIVEL on 5/29/20 0357


Apixaban (Eliquis) 5 Mg Tablet, 5 MG PO BID, (Reported)


   Entered as Reported by: KURT ESQUIVEL on 5/29/20 0357


Digoxin (Digoxin) 125 Mcg Tablet, 125 MCG PO DAILY, (Reported)


   Entered as Reported by: KURT ESQUIVEL on 5/29/20 0357


Diltiazem HCl (Diltiazem HCl) 60 Mg Tablet, 60 MG PO BID, (Reported)


   Entered as Reported by: PAT WELLINGTON on 11/3/20 1038


Finasteride (Finasteride) 5 Mg Tablet, 5 MG PO 1200, (Reported)


   Entered as Reported by: KURT ESQUIVEL on 5/29/20 0357


Gabapentin (Neurontin) 300 Mg Capsule, 300 MG PO TID, (Reported)


   Entered as Reported by: KURT ESQUIVEL on 5/29/20 0357


Metoprolol Tartrate (Metoprolol Tartrate) 50 Mg Tablet, 50 MG PO BID, (Reported)


   Entered as Reported by: KURT ESQUIVEL on 5/29/20 0357


Multivitamin (Multivitamin) 1 Each Tablet, 1 EACH PO DAILY, (Reported)


   Entered as Reported by: PAT WELLINGTON on 5/29/20 1510


Omega-3/Dha/Epa/Fish Oil (Fish Oil 1,000 mg Softgel) 1 Each Capsule, 1 EACH PO 

DAILY, (Reported)


   Entered as Reported by: PAT WELLINGTON on 5/29/20 1510


Pantoprazole Sodium (Pantoprazole Sodium) 40 Mg Tablet.dr, 40 MG PO DAILY, 

(Reported)


   Entered as Reported by: PAT WELLINGTON on 11/3/20 1038


Tamsulosin HCl (Flomax) 0.4 Mg Cap, 0.4 MG PO DAILY, (Reported)


   Entered as Reported by: KURT ESQUIVEL on 5/29/20 0357


Venlafaxine HCl (Venlafaxine HCl ER) 75 Mg Cap.er.24h, 150 MG PO DAILY, 

(Reported)


   Entered as Reported by: KUTR ESQUIVEL on 5/29/20 0357


[Folic Acid]  , 1 MG PO DAILY, (Reported)


   Entered as Reported by: PAT WELLINGTON on 11/3/20 1038





Past Medical-Social-Family Hx


Immunizations Up To Date


Tetanus Booster (TDap):  Less than 5yrs


PED Vaccines UTD:  Yes





Past Medical History


Surgeries:  Yes (BACK SURGERY-DR. REESE)


Orthopedic, Tonsillectomy


Respiratory:  No


Cardiac:  Yes


Atrial Fibrillation, Hypertension


Neurological:  No (? NEUROPATHY OR RESTLESS LEGS ? )


Genitourinary:  Yes (PROSTATE PROBLEMS PER MEDICATION LIST-FLOMAX AND 

FINASTERIDE)


Prostate Problems, Bladder Infection


Gastrointestinal:  No


Musculoskeletal:  Yes (S/P BACK SURGERY WITH HARDWARE IN PLACE)


Chronic Back Pain


Endocrine:  No


HEENT:  No


Cancer:  No


Psychosocial:  Yes (ON EFFEXOR--UNKNOWN DX )


Integumentary:  No


Blood Disorders:  No





Family Medical History





SOCIAL HISTORY: 


-DRINKS "AT LEAST 2 HIGHBALLS EVERY NIGHT" 


-DENIES DRUG USE


-SMOKED A PIPE-STATES HE QUIT IN 1975





Physical Exam


Vital Signs





Vital Signs - First Documented








 10/5/21





 05:01


 


Temp 36.4


 


Pulse 97


 


Resp 18


 


B/P (MAP) 119/87 (98)


 


Pulse Ox 97


 


O2 Delivery Room Air








Height, Weight, BMI


Height: '"


Weight: lbs. oz. kg; 28.00 BMI


Method:





Progress/Results/Core Measures


Results/Orders


Lab Results





Laboratory Tests








Test


 10/5/21


05:11 Range/Units


 


 


White Blood Count


 5.5 


 4.3-11.0


10^3/uL


 


Red Blood Count


 4.24 L


 4.30-5.52


10^6/uL


 


Hemoglobin 13.9  13.3-17.7  g/dL


 


Hematocrit 39 L 40-54  %


 


Mean Corpuscular Volume 93  80-99  fL


 


Mean Corpuscular Hemoglobin 33  25-34  pg


 


Mean Corpuscular Hemoglobin


Concent 35 


 32-36  g/dL





 


Red Cell Distribution Width 12.1  10.0-14.5  %


 


Platelet Count


 181 


 130-400


10^3/uL


 


Mean Platelet Volume 9.8  9.0-12.2  fL


 


Immature Granulocyte % (Auto) 0   %


 


Neutrophils (%) (Auto) 59  42-75  %


 


Lymphocytes (%) (Auto) 24  12-44  %


 


Monocytes (%) (Auto) 13 H 0-12  %


 


Eosinophils (%) (Auto) 3  0-10  %


 


Basophils (%) (Auto) 2  0-10  %


 


Neutrophils # (Auto)


 3.3 


 1.8-7.8


10^3/uL


 


Lymphocytes # (Auto)


 1.3 


 1.0-4.0


10^3/uL


 


Monocytes # (Auto)


 0.7 


 0.0-1.0


10^3/uL


 


Eosinophils # (Auto)


 0.2 


 0.0-0.3


10^3/uL


 


Basophils # (Auto)


 0.1 


 0.0-0.1


10^3/uL


 


Immature Granulocyte # (Auto)


 0.0 


 0.0-0.1


10^3/uL


 


Prothrombin Time 15.0 H 12.2-14.7  SEC


 


INR Comment 1.1  0.8-1.4  


 


Activated Partial


Thromboplast Time 41 H


 24-35  SEC





 


Serum Alcohol 99 H <10  MG/DL








My Orders





Orders - RXOIE CARD DO


Alcohol (10/5/21 05:09)


Cbc With Automated Diff (10/5/21 05:09)


Protime With Inr (10/5/21 05:09)


Partial Thromboplastin Time (10/5/21 05:09)


Oxymetazoline 0.05% Nasal Spry (Afrin 0. (10/5/21 09:00)


Tranexamic Acid Injection (Cyklokapron I (10/5/21 05:15)


Oxymetazoline 0.05% Nasal Spry (Afrin 0. (10/5/21 05:16)


Drug Screen Stat (Urine) (10/5/21 05:43)





Vital Signs/I&O











 10/5/21





 05:01


 


Temp 36.4


 


Pulse 97


 


Resp 18


 


B/P (MAP) 119/87 (98)


 


Pulse Ox 97


 


O2 Delivery Room Air














Blood Pressure Mean:                    98











Departure


Impression





   Primary Impression:  


   Epistaxis


   Additional Impression:  


   Alcohol intoxication


Disposition:  01 HOME, SELF-CARE


Condition:  Stable





Departure-Patient Inst.


Decision time for Depature:  05:44


Referrals:  


NAM THOMAS DO (PCP/Family)


Primary Care Physician


Patient Instructions:  Nosebleeds (DC), Alcohol Use Disorder ED





Add. Discharge Instructions:  


DO NOT RUB OR PICK AT NOSE





NO ALCOHOL





CONTINUE YOUR REGULAR MEDICATIONS AS PRESCRIBED





FOLLOW UP WITH DR. THOMAS IN 1-2 DAYS FOR FURTHER CARE, RETURN TO ER IF 

SYMPTOMS RETURN





All discharge instructions reviewed with patient and/or family. Voiced 

understanding.











ROXIE CARD DO                  Oct 5, 2021 05:45